# Patient Record
Sex: FEMALE | Race: WHITE | NOT HISPANIC OR LATINO | Employment: FULL TIME | URBAN - METROPOLITAN AREA
[De-identification: names, ages, dates, MRNs, and addresses within clinical notes are randomized per-mention and may not be internally consistent; named-entity substitution may affect disease eponyms.]

---

## 2017-12-05 ENCOUNTER — OFFICE VISIT (OUTPATIENT)
Dept: FAMILY MEDICINE | Facility: CLINIC | Age: 57
End: 2017-12-05
Payer: COMMERCIAL

## 2017-12-05 ENCOUNTER — RADIANT APPOINTMENT (OUTPATIENT)
Dept: GENERAL RADIOLOGY | Facility: CLINIC | Age: 57
End: 2017-12-05
Attending: NURSE PRACTITIONER
Payer: COMMERCIAL

## 2017-12-05 VITALS
SYSTOLIC BLOOD PRESSURE: 156 MMHG | RESPIRATION RATE: 16 BRPM | HEART RATE: 82 BPM | DIASTOLIC BLOOD PRESSURE: 90 MMHG | TEMPERATURE: 99.5 F | OXYGEN SATURATION: 95 %

## 2017-12-05 DIAGNOSIS — S92.302A CLOSED FRACTURE OF NECK OF METATARSAL BONE OF LEFT FOOT, INITIAL ENCOUNTER: Primary | ICD-10-CM

## 2017-12-05 DIAGNOSIS — S99.922A INJURY OF LEFT FOOT, INITIAL ENCOUNTER: ICD-10-CM

## 2017-12-05 PROCEDURE — 99214 OFFICE O/P EST MOD 30 MIN: CPT | Performed by: NURSE PRACTITIONER

## 2017-12-05 PROCEDURE — 73630 X-RAY EXAM OF FOOT: CPT | Mod: LT

## 2017-12-05 NOTE — NURSING NOTE
"Chief Complaint   Patient presents with     Musculoskeletal Problem     left foot        Initial /90  Pulse 82  Temp 99.5  F (37.5  C) (Tympanic)  Resp 16  SpO2 95% Estimated body mass index is 33.36 kg/(m^2) as calculated from the following:    Height as of 4/16/12: 5' 10\" (1.778 m).    Weight as of 4/16/12: 232 lb 7.6 oz (105.4 kg).  Medication Reconciliation: complete    Health Maintenance that is potentially due pending provider review:  NONE    n/a    Is there anyone who you would like to be able to receive your results? No  If yes have patient fill out HANNAH      "

## 2017-12-05 NOTE — PATIENT INSTRUCTIONS
Camwalker    Ortho referral    Non weight bearing until seen by ortho.    Follow-up with your primary care provider next week and as needed.    Indications for emergent return to emergency department discussed with patient, who verbalized good understanding and agreement.  Patient understands the limitations of today's evaluation.         Foot Fracture  You have a broken bone (fracture) in your foot. This will cause pain, swelling, and often bruising. It will usually take about 4 to 8 weeks to heal. A foot fracture may be treated with a special shoe, splint, cast, or boot.  Home care  Follow these guidelines when caring for yourself at home:    You may be given a splint, cast, shoe, or boot to keep the injured area from moving. Unless you were told otherwise, use crutches or a walker. Don t put weight on the injured foot until your health care provider says you can do so. (You can rent crutches and a walker at many pharmacies and surgical or orthopedic supply stores.) Don t put weight on a splint, or it will break.    Keep your leg elevated to reduce pain and swelling. When sleeping, put a pillow under the injured leg. When sitting, support the injured leg so it is above your waist. This is very important during the first 2 days (48 hours).    Put an ice pack on the injured area. Do this for 20 minutes every 1 to 2 hours the first day for pain relief. You can make an ice pack by wrapping a plastic bag of ice cubes in a thin towel. As the ice melts, be careful that the splint, cast, boot, or shoe doesn t get wet. You can place the ice pack directly over the splint or cast. Unless told otherwise, you can open the boot or shoe to apply the ice pack. Continue using the ice pack 3 to 4 times a day for the next 2 days. Then use the ice pack as needed to ease pain and swelling.    Keep the splint, cast, boot, or shoe dry. When bathing, protect it with a large plastic bag, rubber-banded at the top end. If a fiberglass  splint or cast or boot gets wet, you can dry it with a hair dryer. Unless told otherwise, you can take off the boot or shoe to bathe.    You may use acetaminophen or ibuprofen to control pain, unless another pain medicine was prescribed. If you have chronic liver or kidney disease, talk with your healthcare provider before using these medicines. Also talk with your provider if you ve had a stomach ulcer or gastrointestinal bleeding.    Don t put creams or objects under the cast if you have itching.  Follow-up care  Follow up with your healthcare provider, or as advised. This is to make sure the bone is healing the way it should. If you were given a splint, it may be changed to a cast or boot at your follow-up visit.  X-rays may be taken. You will be told of any new findings that may affect your care.  When to seek medical advice  Call your healthcare provider right away if any of these occur:    The cast or splint cracks    The plaster cast or splint becomes wet or soft    The fiberglass cast or splint stays wet for more than 24 hours    Bad odor from the cast or wound fluid stains the cast    Tightness or pain under the cast or splint gets worse    Toes become swollen, cold, blue, numb, or tingly    You can t move your toes    Skin around cast or splint becomes red    Fever of 100.4 F (38 C) or higher, or as directed by your healthcare provider  Date Last Reviewed: 2/1/2017 2000-2017 [employer]. [address] [place] [address], [place] [address]. All rights reserved. This information is not intended as a substitute for professional medical care. Always follow your healthcare professional's instructions.

## 2017-12-05 NOTE — PROGRESS NOTES
SUBJECTIVE:   Ai Boyer is a 57 year old female who presents to clinic today for the following health issues:      Musculoskeletal problem/pain      Duration: yesterday     Description  Location: left foot     Intensity:  moderate    Accompanying signs and symptoms: numbness, tingling, swelling and redness, bruising     History  Previous similar problem: YES- has had many foot injuries in the past. Wears a splint because of weakness.   Previous evaluation:  x-ray    Precipitating or alleviating factors:  Trauma or overuse: YES- rolled foot yesterday around 3pm.   Aggravating factors include: walking and overuse    Therapies tried and outcome: Aleve, aspirin 325mg this morning, iced foot every 20 minutes on and off             Problem list and histories reviewed & adjusted, as indicated.  Additional history: as documented    Patient Active Problem List   Diagnosis     Localized osteoarthrosis, ankle and foot     Past Surgical History:   Procedure Laterality Date     ARTHRODESIS ANKLE  4/16/2012    Procedure:ARTHRODESIS ANKLE; right ankle medial closing wedge ostetomy intermedually nailing removal of hardware; Surgeon:HUMA GARRETT; Location:[facility]     GENITOURINARY SURGERY      oophorectomy     ORTHOPEDIC SURGERY      multiple orthopedic       Social History   Substance Use Topics     Smoking status: Current Every Day Smoker     Packs/day: 1.00     Types: Cigarettes     Smokeless tobacco: Never Used     Alcohol use Yes     History reviewed. No pertinent family history.      Current Outpatient Prescriptions   Medication Sig Dispense Refill     Sertraline HCl (ZOLOFT PO) Take 75 mg by mouth daily       order for DME Equipment being ordered: Expandlyker 1 Device 0     Cholecalciferol (VITAMIN D3 PO) Take 2,000 Units by mouth daily.       HYDROmorphone (DILAUDID) 4 MG tablet Take 1-2 tablets by mouth every 4 hours as needed. (Patient not taking: Reported on 12/5/2017) 80 tablet 0     oxyCODONE (OXYCONTIN) 20  MG 12 hr tablet Take 1 tablet by mouth every 12 hours. (Patient not taking: Reported on 12/5/2017) 30 tablet 0     hydrOXYzine (ATARAX) 25 MG tablet Take 1-2 tablets by mouth every 4 hours as needed (pain). (Patient not taking: Reported on 12/5/2017) 60 tablet 0     enoxaparin (LOVENOX) 40 MG/0.4ML SOLN Inject 0.4 mLs Subcutaneous every 24 hours. (Patient not taking: Reported on 12/5/2017) 5 Syringe 0     HYDROCHLOROTHIAZIDE PO Take 25 mg by mouth daily.       ALPRAZOLAM PO Take 0.5 mg by mouth 2 times daily as needed.       Allergies   Allergen Reactions     Sporanox [Itraconazole] Anaphylaxis     Amoxicillin Itching     Ceftin Hives     Clindamycin Hcl Itching     Codeine Sulfate Nausea     Morphine Hcl Hives     Keflex [Cephalexin Hcl] Rash     Labs reviewed in EPIC      Reviewed and updated as needed this visit by clinical staffTobacco  Allergies  Meds  Problems  Med Hx  Surg Hx  Fam Hx  Soc Hx        Reviewed and updated as needed this visit by Provider  Allergies  Meds  Problems         ROS:  Constitutional, HEENT, cardiovascular, pulmonary, GI, , musculoskeletal, neuro, skin, endocrine and psych systems are negative, except as otherwise noted.      OBJECTIVE:   /90  Pulse 82  Temp 99.5  F (37.5  C) (Tympanic)  Resp 16  SpO2 95%  There is no height or weight on file to calculate BMI.   GENERAL: healthy, alert and no distress, nontoxic in appearance  EYES: Eyes grossly normal to inspection, PERRL and conjunctivae and sclerae normal  HENT: normocephalic  NECK: supple with full ROM  ABDOMEN: soft, nontender, no hepatosplenomegaly, no masses   MS: left foot is purplish pink which is normal for her. Has some pain and bruising on top of foot at base of toes. Tenderness around lateral malleolus and on lateral side of 5th metatarsal.    Diagnostic Test Results:XR FOOT LT G/E 3 VW 12/5/2017 3:09 PM     HISTORY: Foot injury.     COMPARISON: None.     FINDINGS: Nondisplaced fracture through the  distal third metatarsal.  Postoperative change in the great toe. Old healed fracture through the  fifth metatarsal. Other visualized osseous structures are within  normal limits.         IMPRESSION: Nondisplaced third metatarsal neck fracture.     YISEL BATEMAN MD  Results for orders placed or performed in visit on 12/05/17 (from the past 24 hour(s))   XR Foot Left G/E 3 Views    Narrative    XR FOOT LT G/E 3 VW 12/5/2017 3:09 PM    HISTORY: Foot injury.    COMPARISON: None.    FINDINGS: Nondisplaced fracture through the distal third metatarsal.  Postoperative change in the great toe. Old healed fracture through the  fifth metatarsal. Other visualized osseous structures are within  normal limits.      Impression    IMPRESSION: Nondisplaced third metatarsal neck fracture.    YISEL BATEMAN MD       ASSESSMENT/PLAN:   Horacio peña is giving her comfort. She is to be non weightbearing until seen by ortho. Follow up with PCP as needed.  Problem List Items Addressed This Visit     None      Visit Diagnoses     Closed fracture of neck of metatarsal bone of left foot, initial encounter    -  Primary    Relevant Medications    order for DME    Other Relevant Orders    ORTHO  REFERRAL    Foot injury        Relevant Orders    XR Foot Left G/E 3 Views (Completed)               Patient Instructions   Hakeemwalneda    Ortho referral    Non weight bearing until seen by ortho.    Follow-up with your primary care provider next week and as needed.    Indications for emergent return to emergency department discussed with patient, who verbalized good understanding and agreement.  Patient understands the limitations of today's evaluation.         Foot Fracture  You have a broken bone (fracture) in your foot. This will cause pain, swelling, and often bruising. It will usually take about 4 to 8 weeks to heal. A foot fracture may be treated with a special shoe, splint, cast, or boot.  Home care  Follow these guidelines when caring  for yourself at home:    You may be given a splint, cast, shoe, or boot to keep the injured area from moving. Unless you were told otherwise, use crutches or a walker. Don t put weight on the injured foot until your health care provider says you can do so. (You can rent crutches and a walker at many pharmacies and surgical or orthopedic supply stores.) Don t put weight on a splint, or it will break.    Keep your leg elevated to reduce pain and swelling. When sleeping, put a pillow under the injured leg. When sitting, support the injured leg so it is above your waist. This is very important during the first 2 days (48 hours).    Put an ice pack on the injured area. Do this for 20 minutes every 1 to 2 hours the first day for pain relief. You can make an ice pack by wrapping a plastic bag of ice cubes in a thin towel. As the ice melts, be careful that the splint, cast, boot, or shoe doesn t get wet. You can place the ice pack directly over the splint or cast. Unless told otherwise, you can open the boot or shoe to apply the ice pack. Continue using the ice pack 3 to 4 times a day for the next 2 days. Then use the ice pack as needed to ease pain and swelling.    Keep the splint, cast, boot, or shoe dry. When bathing, protect it with a large plastic bag, rubber-banded at the top end. If a fiberglass splint or cast or boot gets wet, you can dry it with a hair dryer. Unless told otherwise, you can take off the boot or shoe to bathe.    You may use acetaminophen or ibuprofen to control pain, unless another pain medicine was prescribed. If you have chronic liver or kidney disease, talk with your healthcare provider before using these medicines. Also talk with your provider if you ve had a stomach ulcer or gastrointestinal bleeding.    Don t put creams or objects under the cast if you have itching.  Follow-up care  Follow up with your healthcare provider, or as advised. This is to make sure the bone is healing the way it  should. If you were given a splint, it may be changed to a cast or boot at your follow-up visit.  X-rays may be taken. You will be told of any new findings that may affect your care.  When to seek medical advice  Call your healthcare provider right away if any of these occur:    The cast or splint cracks    The plaster cast or splint becomes wet or soft    The fiberglass cast or splint stays wet for more than 24 hours    Bad odor from the cast or wound fluid stains the cast    Tightness or pain under the cast or splint gets worse    Toes become swollen, cold, blue, numb, or tingly    You can t move your toes    Skin around cast or splint becomes red    Fever of 100.4 F (38 C) or higher, or as directed by your healthcare provider  Date Last Reviewed: 2/1/2017 2000-2017 [employer]. [address] [place] [address]. All rights reserved. This information is not intended as a substitute for professional medical care. Always follow your healthcare professional's instructions.            ROXANNA Rankin [facility]

## 2017-12-05 NOTE — MR AVS SNAPSHOT
After Visit Summary   12/5/2017    Ai Boyer    MRN: [id number]           Patient Information     Date Of Birth          1960        Visit Information        Provider Department      12/5/2017 2:00 PM Lita Diaz APRN [facility]        Today's Diagnoses     Closed fracture of neck of metatarsal bone of left foot, initial encounter    -  1    Foot injury          Care Instructions    Camwalker    Ortho referral    Non weight bearing until seen by ortho.    Follow-up with your primary care provider next week and as needed.    Indications for emergent return to emergency department discussed with patient, who verbalized good understanding and agreement.  Patient understands the limitations of today's evaluation.         Foot Fracture  You have a broken bone (fracture) in your foot. This will cause pain, swelling, and often bruising. It will usually take about 4 to 8 weeks to heal. A foot fracture may be treated with a special shoe, splint, cast, or boot.  Home care  Follow these guidelines when caring for yourself at home:    You may be given a splint, cast, shoe, or boot to keep the injured area from moving. Unless you were told otherwise, use crutches or a walker. Don t put weight on the injured foot until your health care provider says you can do so. (You can rent crutches and a walker at many pharmacies and surgical or orthopedic supply stores.) Don t put weight on a splint, or it will break.    Keep your leg elevated to reduce pain and swelling. When sleeping, put a pillow under the injured leg. When sitting, support the injured leg so it is above your waist. This is very important during the first 2 days (48 hours).    Put an ice pack on the injured area. Do this for 20 minutes every 1 to 2 hours the first day for pain relief. You can make an ice pack by wrapping a plastic bag of ice cubes in a thin towel. As the ice melts, be careful that the splint,  cast, boot, or shoe doesn t get wet. You can place the ice pack directly over the splint or cast. Unless told otherwise, you can open the boot or shoe to apply the ice pack. Continue using the ice pack 3 to 4 times a day for the next 2 days. Then use the ice pack as needed to ease pain and swelling.    Keep the splint, cast, boot, or shoe dry. When bathing, protect it with a large plastic bag, rubber-banded at the top end. If a fiberglass splint or cast or boot gets wet, you can dry it with a hair dryer. Unless told otherwise, you can take off the boot or shoe to bathe.    You may use acetaminophen or ibuprofen to control pain, unless another pain medicine was prescribed. If you have chronic liver or kidney disease, talk with your healthcare provider before using these medicines. Also talk with your provider if you ve had a stomach ulcer or gastrointestinal bleeding.    Don t put creams or objects under the cast if you have itching.  Follow-up care  Follow up with your healthcare provider, or as advised. This is to make sure the bone is healing the way it should. If you were given a splint, it may be changed to a cast or boot at your follow-up visit.  X-rays may be taken. You will be told of any new findings that may affect your care.  When to seek medical advice  Call your healthcare provider right away if any of these occur:    The cast or splint cracks    The plaster cast or splint becomes wet or soft    The fiberglass cast or splint stays wet for more than 24 hours    Bad odor from the cast or wound fluid stains the cast    Tightness or pain under the cast or splint gets worse    Toes become swollen, cold, blue, numb, or tingly    You can t move your toes    Skin around cast or splint becomes red    Fever of 100.4 F (38 C) or higher, or as directed by your healthcare provider  Date Last Reviewed: 2/1/2017 2000-2017 [employer]. [address] [place] [address]. All rights reserved. This  information is not intended as a substitute for professional medical care. Always follow your healthcare professional's instructions.                Follow-ups after your visit        Additional Services     ORTHO  REFERRAL       [facility] is referring you to the Orthopedic  Services at [facility].       The  Representative will assist you in the coordination of your Orthopedic and Musculoskeletal Care as prescribed by your physician.    The  Representative will call you within 1 business day to help schedule your appointment, or you may contact the  Representative at:    All areas ~ [phone]     Type of Referral : Non Surgical       Timeframe requested: 1 - 2 days    Coverage of these services is subject to the terms and limitations of your health insurance plan.  Please call member services at your health plan with any benefit or coverage questions.      If X-rays, CT or MRI's have been performed, please contact the facility where they were done to arrange for , prior to your scheduled appointment.  Please bring this referral request to your appointment and present it to your specialist.                  Follow-up notes from your care team     See patient instructions section of the AVS Return in about 2 days (around 12/7/2017) for Follow up with your specialist.      Who to contact     If you have questions or need follow up information about today's clinic visit or your schedule please contact [facility] directly at [phone].  Normal or non-critical lab and imaging results will be communicated to you by MyChart, letter or phone within 4 business days after the clinic has received the results. If you do not hear from us within 7 days, please contact the clinic through MyChart or phone. If you have a critical or abnormal lab result, we will notify you by phone as soon as possible.  Submit refill  "requests through [employer] or call your pharmacy and they will forward the refill request to us. Please allow 3 business days for your refill to be completed.          Additional Information About Your Visit        [email or web address] Information     [employer] lets you send messages to your doctor, view your test results, renew your prescriptions, schedule appointments and more. To sign up, go to [email or web address] . Click on \"Log in\" on the left side of the screen, which will take you to the Welcome page. Then click on \"Sign up Now\" on the right side of the page.     You will be asked to enter the access code listed below, as well as some personal information. Please follow the directions to create your username and password.     Your access code is: [email or web address]  Expires: 3/5/2018  3:29 PM     Your access code will  in 90 days. If you need help or a new code, please call your [facility] clinic or [phone].        Care EveryWhere ID     This is your Care EveryWhere ID. This could be used by other organizations to access your [facility] medical records  [id number]        Your Vitals Were     Pulse Temperature Respirations Pulse Oximetry          82 99.5  F (37.5  C) (Tympanic) 16 95%         Blood Pressure from Last 3 Encounters:   17 156/90   12 93/54    Weight from Last 3 Encounters:   12 232 lb 7.6 oz (105.4 kg)              We Performed the Following     ORTHO  REFERRAL     XR Foot Left G/E 3 Views          Today's Medication Changes          These changes are accurate as of: 17  3:29 PM.  If you have any questions, ask your nurse or doctor.               Start taking these medicines.        Dose/Directions    order for DME   Used for:  Closed fracture of neck of metatarsal bone of left foot, initial encounter   Started by:  Lita Diaz APRN CNP        Equipment being ordered: casey   Quantity:  1 Device   Refills:  0            Where to get your medicines      Some of " these will need a paper prescription and others can be bought over the counter.  Ask your nurse if you have questions.     Bring a paper prescription for each of these medications     order for DME                Primary Care Provider Fax #    [facility] [phone]       [address] [address]        Equal Access to Services     VAL FERNANDEZ : Juan Manuel dobbs hadasho Soomaali, waaxda luqadaha, qaybta kaalmada adeegyada, tova valdezzulmaarvind rees. So [facility] [phone].    ATENCIÓN: Si habla español, tiene a beltran disposición servicios gratuitos de asistencia lingüística. Sheila al [phone].    We comply with applicable federal civil rights laws and [place] laws. We do not discriminate on the basis of race, color, national origin, age, disability, sex, sexual orientation, or gender identity.            Thank you!     Thank you for choosing [facility]  for your care. Our goal is always to provide you with excellent care. Hearing back from our patients is one way we can continue to improve our services. Please take a few minutes to complete the written survey that you may receive in the mail after your visit with us. Thank you!             Your Updated Medication List - Protect others around you: Learn how to safely use, store and throw away your medicines at www.disposemymeds.org.          This list is accurate as of: 12/5/17  3:29 PM.  Always use your most recent med list.                   Brand Name Dispense Instructions for use Diagnosis    ALPRAZOLAM PO      Take 0.5 mg by mouth 2 times daily as needed.        enoxaparin 40 MG/0.4ML injection    LOVENOX    5 Syringe    Inject 0.4 mLs Subcutaneous every 24 hours.    Localized osteoarthrosis not specified whether primary or secondary, ankle and foot       HYDROCHLOROTHIAZIDE PO      Take 25 mg by mouth daily.        HYDROmorphone 4 MG tablet    DILAUDID    80 tablet    Take 1-2 tablets by mouth  every 4 hours as needed.    Localized osteoarthrosis not specified whether primary or secondary, ankle and foot       hydrOXYzine 25 MG tablet    ATARAX    60 tablet    Take 1-2 tablets by mouth every 4 hours as needed (pain).    Localized osteoarthrosis not specified whether primary or secondary, ankle and foot       order for DME     1 Device    Equipment being ordered: camwalker    Closed fracture of neck of metatarsal bone of left foot, initial encounter       oxyCODONE 20 MG 12 hr tablet    OxyCONTIN    30 tablet    Take 1 tablet by mouth every 12 hours.    Localized osteoarthrosis not specified whether primary or secondary, ankle and foot       VITAMIN D3 PO      Take 2,000 Units by mouth daily.        ZOLOFT PO      Take 75 mg by mouth daily

## 2018-11-13 DIAGNOSIS — Z96.642 HISTORY OF TOTAL HIP ARTHROPLASTY, LEFT: Primary | ICD-10-CM

## 2018-11-13 LAB
CRP SERPL-MCNC: <2.9 MG/L (ref 0–8)
ERYTHROCYTE [SEDIMENTATION RATE] IN BLOOD BY WESTERGREN METHOD: 3 MM/H (ref 0–30)

## 2018-11-13 PROCEDURE — 85652 RBC SED RATE AUTOMATED: CPT | Performed by: ORTHOPAEDIC SURGERY

## 2018-11-13 PROCEDURE — 36415 COLL VENOUS BLD VENIPUNCTURE: CPT | Performed by: ORTHOPAEDIC SURGERY

## 2018-11-13 PROCEDURE — 82495 ASSAY OF CHROMIUM: CPT | Mod: 90 | Performed by: ORTHOPAEDIC SURGERY

## 2018-11-13 PROCEDURE — 83018 HEAVY METAL QUAN EACH NES: CPT | Mod: 90 | Performed by: ORTHOPAEDIC SURGERY

## 2018-11-13 PROCEDURE — 86140 C-REACTIVE PROTEIN: CPT | Performed by: ORTHOPAEDIC SURGERY

## 2018-11-13 PROCEDURE — 99000 SPECIMEN HANDLING OFFICE-LAB: CPT | Performed by: ORTHOPAEDIC SURGERY

## 2018-11-14 ENCOUNTER — TELEPHONE (OUTPATIENT)
Dept: FAMILY MEDICINE | Facility: CLINIC | Age: 58
End: 2018-11-14

## 2018-11-15 NOTE — TELEPHONE ENCOUNTER
Pt called back and read back the message re: Colon Cancer Screening.    Pt had it done with Leonarda Aguilar 2015 and is not due until 2020.    Marita More  [facility]

## 2018-11-22 LAB
COBALT SERPL-MCNC: 100.5 UG/L
CR SERPL-MCNC: 51 UG/L

## 2018-12-04 ENCOUNTER — HOSPITAL ENCOUNTER (OUTPATIENT)
Dept: LAB | Facility: CLINIC | Age: 58
Discharge: HOME | End: 2018-12-04
Attending: INTERNAL MEDICINE | Admitting: INTERNAL MEDICINE
Payer: COMMERCIAL

## 2018-12-04 ENCOUNTER — HOSPITAL ENCOUNTER (OUTPATIENT)
Facility: CLINIC | Age: 58
Discharge: HOME | End: 2018-12-04
Attending: INTERNAL MEDICINE | Admitting: INTERNAL MEDICINE
Payer: COMMERCIAL

## 2018-12-04 ENCOUNTER — ONCOLOGY VISIT (OUTPATIENT)
Dept: ONCOLOGY | Facility: CLINIC | Age: 58
End: 2018-12-04
Attending: INTERNAL MEDICINE
Payer: COMMERCIAL

## 2018-12-04 VITALS
RESPIRATION RATE: 18 BRPM | HEART RATE: 86 BPM | HEIGHT: 69 IN | DIASTOLIC BLOOD PRESSURE: 80 MMHG | OXYGEN SATURATION: 98 % | SYSTOLIC BLOOD PRESSURE: 155 MMHG | WEIGHT: 208.9 LBS | TEMPERATURE: 99 F | BODY MASS INDEX: 30.94 KG/M2

## 2018-12-04 DIAGNOSIS — D75.1 ERYTHROCYTOSIS: Primary | ICD-10-CM

## 2018-12-04 LAB
BASOPHILS # BLD AUTO: 0.1 10E9/L (ref 0–0.2)
BASOPHILS NFR BLD AUTO: 0.9 %
DIFFERENTIAL METHOD BLD: NORMAL
EOSINOPHIL # BLD AUTO: 0.1 10E9/L (ref 0–0.7)
EOSINOPHIL NFR BLD AUTO: 1.7 %
ERYTHROCYTE [DISTWIDTH] IN BLOOD BY AUTOMATED COUNT: 12.5 % (ref 10–15)
HCT VFR BLD AUTO: 47 % (ref 35–47)
HGB BLD-MCNC: 15.6 G/DL (ref 11.7–15.7)
IMM GRANULOCYTES # BLD: 0 10E9/L (ref 0–0.4)
IMM GRANULOCYTES NFR BLD: 0.3 %
LYMPHOCYTES # BLD AUTO: 1.9 10E9/L (ref 0.8–5.3)
LYMPHOCYTES NFR BLD AUTO: 30.1 %
MCH RBC QN AUTO: 30.6 PG (ref 26.5–33)
MCHC RBC AUTO-ENTMCNC: 33.2 G/DL (ref 31.5–36.5)
MCV RBC AUTO: 92 FL (ref 78–100)
MONOCYTES # BLD AUTO: 0.5 10E9/L (ref 0–1.3)
MONOCYTES NFR BLD AUTO: 7.2 %
NEUTROPHILS # BLD AUTO: 3.8 10E9/L (ref 1.6–8.3)
NEUTROPHILS NFR BLD AUTO: 59.8 %
NRBC # BLD AUTO: 0 10*3/UL
NRBC BLD AUTO-RTO: 0 /100
PLATELET # BLD AUTO: 224 10E9/L (ref 150–450)
RBC # BLD AUTO: 5.1 10E12/L (ref 3.8–5.2)
WBC # BLD AUTO: 6.4 10E9/L (ref 4–11)

## 2018-12-04 PROCEDURE — 81403 MOPATH PROCEDURE LEVEL 4: CPT | Performed by: INTERNAL MEDICINE

## 2018-12-04 PROCEDURE — 82668 ASSAY OF ERYTHROPOIETIN: CPT | Performed by: INTERNAL MEDICINE

## 2018-12-04 PROCEDURE — G0463 HOSPITAL OUTPT CLINIC VISIT: HCPCS

## 2018-12-04 PROCEDURE — 85025 COMPLETE CBC W/AUTO DIFF WBC: CPT | Performed by: INTERNAL MEDICINE

## 2018-12-04 PROCEDURE — 85060 BLOOD SMEAR INTERPRETATION: CPT | Performed by: INTERNAL MEDICINE

## 2018-12-04 PROCEDURE — 36415 COLL VENOUS BLD VENIPUNCTURE: CPT | Performed by: INTERNAL MEDICINE

## 2018-12-04 PROCEDURE — 99204 OFFICE O/P NEW MOD 45 MIN: CPT | Performed by: INTERNAL MEDICINE

## 2018-12-04 PROCEDURE — 81219 CALR GENE COM VARIANTS: CPT | Performed by: INTERNAL MEDICINE

## 2018-12-04 PROCEDURE — 40000847 ZZHCL STATISTIC MORPHOLOGY W/INTERP HISTOLOGY TC 85060: Performed by: INTERNAL MEDICINE

## 2018-12-04 RX ORDER — BUPROPION HYDROCHLORIDE 150 MG/1
150 TABLET ORAL DAILY
COMMUNITY
Start: 2018-10-22 | End: 2021-12-02

## 2018-12-04 ASSESSMENT — PAIN SCALES - GENERAL: PAINLEVEL: NO PAIN (0)

## 2018-12-04 NOTE — LETTER
"    12/4/2018         RE: Ai Boyer  [address]  [facility] [address]        Dear Colleague,    Thank you for referring your patient, Ai Boyer, to the [facility]. Please see a copy of my visit note below.    Visit Date:   12/04/2018      NEW PATIENT HEMATOLOGY CONSULTATION      REQUESTING PHYSICIAN:  Dr. More Brooks      CHIEF COMPLAINT/REASON FOR VISIT:  Erythrocytosis.      HISTORY OF PRESENT ILLNESS:  [age] years old.  She presented to us December 2018 at age 58 with the blood count from her OB/GYN's office back in October 2018 identified hemoglobin of 16.2, hematocrit of 48.9 with normal white count, platelet and differential.  Sed rate and C-reactive protein were also normal at that time.      According to the patient, this is the first time she has high hemoglobin through her GYN annual visit lab work.  She is a lifetime smoker, 1-1.5 pack per day since high school.  She also drinks alcohol.    She has been undergoing a lot of stress since fall 2018 with first rash treated with antiviral and then \"clicking sound of her left hip.\"  She had a hip replacement before and found to have high metal  content in the blood through her primary care doctor, likely released from her replaced hip.  She is going to have left hip revision.      She denied any night sweat, weight loss, pruritus changes on the skin.        She had baseline hemoglobin of 9 in  2009 in our system, so this is new mild erythrocytosis presented for this patient at age 58 in October 2018.      MEDICATIONS:  Reviewed in Epic system.        OTHER MEDICAL PROBLEMS:  Otherwise not significant.  She had a left hip replacement, and she has depression on Zoloft and anxiety.      SOCIAL HISTORY:  She is a lifetime smoker as above, 1-1.5 pack per day.  Chronic alcohol use, drinks beer every day.      FAMILY HISTORY:  Positive for grandmother had ovarian cancer.  No family history cytosis on the blood.      REVIEW OF " "SYSTEMS:     GENERAL: She is anxious.  No weight loss.  No B symptoms.     NEURO:   No headache, double vision, or focal weakness.  No neuropathy.   SKIN:  No chronic skin rash or skin infection.   CARDIOVASCULAR:  No palpitations, no chest pressure, no dyspnea on exertion.   PULMONARY:  No shortness of breath, no pleurisy, no cough, no hemoptysis.   GI:  No abdominal pain, nausea, vomiting, constipation.  No diarrhea.  No bright red blood per rectum.   :  No urgency, frequency.  No recurrent urinary tract infection.  No kidney problems.   RHEUMATOLOGY/MUSCULOSKELETAL SYSTEM:  No arthritis.  No joint pain.  No muscle ache.   ENDOCRINE:  No history of diabetes or thyroid problem.  No complaints of hot flashes.   HEMATOLOGY:  She had mild new erythrocytosis found in October 2018.     IMMUNOLOGY:  No recurrent fever or chills episode.  No recurrent infectious episode.   BREASTS/GYN:  No history of vaginal bleeding/discharge/dryness.  No breast pain or nipple discharge.       PHYSICAL EXAMINATION:   VITAL SIGNS:  Blood pressure 155/80, pulse 86, temperature 99  F (37.2  C), temperature source Tympanic, resp. rate 18, height 1.753 m (5' 9\"), weight 94.8 kg (208 lb 14.4 oz), SpO2 98 %, not currently breastfeeding.    ECOG status: 0.   GENERAL APPEARANCE:  Middle-aged woman looks like her stated age.  She has pleothorac face.  She is anxious.  She talks fast.     HEENT: The patient is normocephalic, atraumatic. Pupils are equally reactive to light.  Sclerae are anicteric.  Moist oral mucosa.  Negative pharynx.  No oral thrush.   NECK:  Supple.  No jugular venous distention.  Thyroid is not palpable.   LYMPH NODES:  Superficial lymphadenopathy is not appreciable in the bilateral cervical, supraclavicular, axillary or inguinal areas.   CARDIOVASCULAR:  S1, S2 regular with no murmurs or gallops.  No carotid or abdominal bruits.   PULMONARY:  Lungs are clear to auscultation and percussion bilaterally.  There is no wheezing " or rhonchi.   GASTROINTESTINAL:  Abdomen is soft, nontender.  No hepatosplenomegaly.  No signs of ascites.  No mass appreciable.   MUSCULOSKELETAL/EXTREMITIES:  No edema.  No cyanotic changes.  No signs of joint deformity.  No lymphedema.  No spinal or paraspinal tenderness.  No CVA tenderness.   NEUROLOGIC:  Cranial nerves II-XII are grossly intact.  Sensation intact.  Muscle strength and muscle tone symmetrical, 5/5 throughout.   SKIN:  No petechiae.  No rash.  No signs of cellulitis.       CURRENT LABORATORY DATA   outside from OB/GYN's office 10/2018 -  hemoglobin of 16.2, hematocrit of 48.9 with normal white count, platelet and differential.  Sed rate and C-reactive protein were also normal at that time.      CURRENT IMAGE DATA:  No current image data.        OLD DATA REVIEWED WITH SUMMARY:    Baseline hemoglobin in our system was 9.0.    MRI of lumbar spine 2009, DJD.      ASSESSMENT AND PLAN:   At age 58, she was found to have mild new erythrocytosis with hematocrit of 48.9 with normal white count, differential, and platelet count.  She is a lifetime smoker.  She also drank alcohol every day.  She has been going through a lot of stress.      1.  Top differential is smoking and stress-related erythrocytosis reactive, yet we will proceed with the hematologic workup with blood work, imaging study.  We will call her with result.   2.  Lifetime smoker.  Smoking cessation is advised.   3.  Alcohol overuse.  Alcohol cessation is advised.         JOSE M JUÁREZ MD             D: 2018   T: 2018   MT: FRANKI      Name:     LISBET KRISHNAMURTHY   MRN:      [id number]        Account:      [id number]   :      1960           Visit Date:   2018      Document: [id number]        Again, thank you for allowing me to participate in the care of your patient.        Sincerely,        Marycarmen Juárez MD, MD

## 2018-12-04 NOTE — MR AVS SNAPSHOT
After Visit Summary   12/4/2018    Ai Boyer    MRN: [id number]           Patient Information     Date Of Birth          1960        Visit Information        Provider Department      12/4/2018 10:00 AM Marycarmen Palmer MD [facility]        Today's Diagnoses     Erythrocytosis    -  1      Care Instructions    Labs and US as work up, will call pt on results.           Follow-ups after your visit        Your next 10 appointments already scheduled     Dec 12, 2018  8:00 AM CST   US ABDOMEN COMPLETE with WYUS1   [facility] ([facility])    [address]  [facility] [address]   [phone]           How do I prepare for my exam? (Food and drink instructions) Adults: No eating, smoking, gum chewing or drinking for 8 hours before the exam. You may take medicine with a small sip of water.  Children: * Infants, breast-fed: may have breast milk up to 2 hours before exam. * Infants, formula: may have bottle until 4 hours before exam. * Children 1-5 years: No food or drink for 4 hours before exam. * Children 6 -12 years: No food or drink for 6 hours before exam. * Children over 12 years: No food or drink for 8 hours before exam.  * J Tube Fed: No need to stop feedings.  What should I wear: Wear comfortable clothes.  How long does the exam take: Most ultrasounds take 30 to 60 minutes.  What should I bring: Bring a list of your medicines, including vitamins, minerals and over-the-counter drugs. It is safest to leave personal items at home.  Do I need a :  No  is needed.  What do I need to tell my doctor: Tell your doctor about any allergies you may have.  What should I do after the exam: No restrictions, You may resume normal activities.  What is this test: An ultrasound uses sound waves to make pictures of the body. Sound waves do not cause pain. The only discomfort may be the pressure of the wand against your skin or full bladder.  Who should I  "call with questions: If you have any questions, please call the Imaging Department where you will have your exam. Directions, parking instructions, and other information is available on our website, [email or web address].            2019   Procedure with More Stephens MD   [facility] (--)    [address]  [facility] [address]   [phone]              Future tests that were ordered for you today     Open Future Orders        Priority Expected Expires Ordered    US Abdomen Complete Routine  2019            Who to contact     If you have questions or need follow up information about today's clinic visit or your schedule please contact [facility] directly at [phone].  Normal or non-critical lab and imaging results will be communicated to you by MyChart, letter or phone within 4 business days after the clinic has received the results. If you do not hear from us within 7 days, please contact the clinic through MyChart or phone. If you have a critical or abnormal lab result, we will notify you by phone as soon as possible.  Submit refill requests through Fancy or call your pharmacy and they will forward the refill request to us. Please allow 3 business days for your refill to be completed.          Additional Information About Your Visit        Fancy Information     Fancy lets you send messages to your doctor, view your test results, renew your prescriptions, schedule appointments and more. To sign up, go to [email or web address] . Click on \"Log in\" on the left side of the screen, which will take you to the Welcome page. Then click on \"Sign up Now\" on the right side of the page.     You will be asked to enter the access code listed below, as well as some personal information. Please follow the directions to create your username and password.     Your access code is: [email or web address]  Expires: 3/4/2019 10:46 AM     Your access code will  in " "90 days. If you need help or a new code, please call your [place] clinic or [phone].        Care EveryWhere ID     This is your Care EveryWhere ID. This could be used by other organizations to access your [place] medical records  [id number]        Your Vitals Were     Pulse Temperature Respirations Height Pulse Oximetry Breastfeeding?    86 99  F (37.2  C) (Tympanic) 18 1.753 m (5' 9\") 98% No    BMI (Body Mass Index)                   30.85 kg/m2            Blood Pressure from Last 3 Encounters:   12/04/18 155/80   12/05/17 156/90   04/18/12 93/54    Weight from Last 3 Encounters:   12/04/18 94.8 kg (208 lb 14.4 oz)   04/16/12 105.4 kg (232 lb 7.6 oz)              We Performed the Following     Bld morphology pathology review     CBC with platelets differential     Erythropoietin     Next Generation Sequencing Oncology: Myeloproliferative Neoplasm Panel        Primary Care Provider Fax #    [facility] [phone]       [address] [address]        Equal Access to Services     SUSY FERNANDEZ : Hadii felicia dobbs hadasho Soomaali, waaxda luqadaha, qaybta kaalmada adeegbharat, tova martinez . So [facility] [phone].    ATENCIÓN: Si habla español, tiene a beltran disposición servicios gratuitos de asistencia lingüística. Llame al [phone].    We comply with applicable federal civil rights laws and [place] laws. We do not discriminate on the basis of race, color, national origin, age, disability, sex, sexual orientation, or gender identity.            Thank you!     Thank you for choosing [facility] [place] by [facility]  for your care. Our goal is always to provide you with excellent care. Hearing back from our patients is one way we can continue to improve our services. Please take a few minutes to complete the written survey that you may receive in the mail after your visit with us. Thank you!             Your Updated Medication List - Protect others around you: Learn " how to safely use, store and throw away your medicines at www.disposemymeds.org.          This list is accurate as of 12/4/18 10:46 AM.  Always use your most recent med list.                   Brand Name Dispense Instructions for use Diagnosis    buPROPion 150 MG 24 hr tablet    WELLBUTRIN XL      Erythrocytosis       order for DME     1 Device    Equipment being ordered: casey    Closed fracture of neck of metatarsal bone of left foot, initial encounter       VITAMIN D3 PO      Take 2,000 Units by mouth daily.        ZOLOFT PO      Take 75 mg by mouth daily

## 2018-12-04 NOTE — PATIENT INSTRUCTIONS
Dr. Palmer would like you to have an ultrasound and labs. We will call you with these results.     When you are in need of a refill, please call your pharmacy and they will send us a request.      Copy of appointments, and after visit summary (AVS) given to patient.      If you have any questions please call Kandice Chau RN, BSN Oncology Hematology  [facility] [phone]. For questions after business hours, or on holidays/weekends, please call our after hours Nurse Triage line [phone]. Thank you.               Labs and US as work up, will call pt on results.

## 2018-12-04 NOTE — NURSING NOTE
"Oncology Rooming Note    December 4, 2018 10:21 AM   Ai Boyer is a 58 year old female who presents for:    Chief Complaint   Patient presents with     Hematology     NEW, elevated red blood cells, hgb and hematocrit.      Initial Vitals: Pulse 86  Temp 99  F (37.2  C) (Tympanic)  Resp 18  Ht 1.753 m (5' 9\")  Wt 94.8 kg (208 lb 14.4 oz)  SpO2 98%  Breastfeeding? No  BMI 30.85 kg/m2 Estimated body mass index is 30.85 kg/(m^2) as calculated from the following:    Height as of this encounter: 1.753 m (5' 9\").    Weight as of this encounter: 94.8 kg (208 lb 14.4 oz). Body surface area is 2.15 meters squared.  No Pain (0) Comment: Data Unavailable   No LMP recorded. Patient is postmenopausal.  Allergies reviewed: Yes  Medications reviewed: Yes    Medications: Medication refills not needed today.  Pharmacy name entered into EPIC: Data Unavailable    Clinical concerns: NEW, elevated red blood cells, hgb, and hematocrit. C/o headaches, hungry but feels full quickly, skin redness. Diagnosed with shingle in September of 2018.    8 minutes for nursing intake (face to face time)     Angy Burch CMA            "

## 2018-12-04 NOTE — PROGRESS NOTES
"Visit Date:   12/04/2018      NEW PATIENT HEMATOLOGY CONSULTATION      REQUESTING PHYSICIAN:  Dr. More Brooks      CHIEF COMPLAINT/REASON FOR VISIT:  Erythrocytosis.      HISTORY OF PRESENT ILLNESS:  [age] years old.  She presented to us December 2018 at age 58 with the blood count from her OB/GYN's office back in October 2018 identified hemoglobin of 16.2, hematocrit of 48.9 with normal white count, platelet and differential.  Sed rate and C-reactive protein were also normal at that time.      According to the patient, this is the first time she has high hemoglobin through her GYN annual visit lab work.  She is a lifetime smoker, 1-1.5 pack per day since high school.  She also drinks alcohol.    She has been undergoing a lot of stress since fall 2018 with first rash treated with antiviral and then \"clicking sound of her left hip.\"  She had a hip replacement before and found to have high metal  content in the blood through her primary care doctor, likely released from her replaced hip.  She is going to have left hip revision.      She denied any night sweat, weight loss, pruritus changes on the skin.        She had baseline hemoglobin of 9 in  2009 in our system, so this is new mild erythrocytosis presented for this patient at age 58 in October 2018.      MEDICATIONS:  Reviewed in Epic system.        OTHER MEDICAL PROBLEMS:  Otherwise not significant.  She had a left hip replacement, and she has depression on Zoloft and anxiety.      SOCIAL HISTORY:  She is a lifetime smoker as above, 1-1.5 pack per day.  Chronic alcohol use, drinks beer every day.      FAMILY HISTORY:  Positive for grandmother had ovarian cancer.  No family history cytosis on the blood.      REVIEW OF SYSTEMS:     GENERAL: She is anxious.  No weight loss.  No B symptoms.     NEURO:   No headache, double vision, or focal weakness.  No neuropathy.   SKIN:  No chronic skin rash or skin infection.   CARDIOVASCULAR:  No palpitations, no chest " "pressure, no dyspnea on exertion.   PULMONARY:  No shortness of breath, no pleurisy, no cough, no hemoptysis.   GI:  No abdominal pain, nausea, vomiting, constipation.  No diarrhea.  No bright red blood per rectum.   :  No urgency, frequency.  No recurrent urinary tract infection.  No kidney problems.   RHEUMATOLOGY/MUSCULOSKELETAL SYSTEM:  No arthritis.  No joint pain.  No muscle ache.   ENDOCRINE:  No history of diabetes or thyroid problem.  No complaints of hot flashes.   HEMATOLOGY:  She had mild new erythrocytosis found in October 2018.     IMMUNOLOGY:  No recurrent fever or chills episode.  No recurrent infectious episode.   BREASTS/GYN:  No history of vaginal bleeding/discharge/dryness.  No breast pain or nipple discharge.       PHYSICAL EXAMINATION:   VITAL SIGNS:  Blood pressure 155/80, pulse 86, temperature 99  F (37.2  C), temperature source Tympanic, resp. rate 18, height 1.753 m (5' 9\"), weight 94.8 kg (208 lb 14.4 oz), SpO2 98 %, not currently breastfeeding.    ECOG status: 0.   GENERAL APPEARANCE:  Middle-aged woman looks like her stated age.  She has pleothorac face.  She is anxious.  She talks fast.     HEENT: The patient is normocephalic, atraumatic. Pupils are equally reactive to light.  Sclerae are anicteric.  Moist oral mucosa.  Negative pharynx.  No oral thrush.   NECK:  Supple.  No jugular venous distention.  Thyroid is not palpable.   LYMPH NODES:  Superficial lymphadenopathy is not appreciable in the bilateral cervical, supraclavicular, axillary or inguinal areas.   CARDIOVASCULAR:  S1, S2 regular with no murmurs or gallops.  No carotid or abdominal bruits.   PULMONARY:  Lungs are clear to auscultation and percussion bilaterally.  There is no wheezing or rhonchi.   GASTROINTESTINAL:  Abdomen is soft, nontender.  No hepatosplenomegaly.  No signs of ascites.  No mass appreciable.   MUSCULOSKELETAL/EXTREMITIES:  No edema.  No cyanotic changes.  No signs of joint deformity.  No lymphedema.  No " spinal or paraspinal tenderness.  No CVA tenderness.   NEUROLOGIC:  Cranial nerves II-XII are grossly intact.  Sensation intact.  Muscle strength and muscle tone symmetrical, 5/5 throughout.   SKIN:  No petechiae.  No rash.  No signs of cellulitis.       CURRENT LABORATORY DATA   outside from OB/GYN's office 10/2018 -  hemoglobin of 16.2, hematocrit of 48.9 with normal white count, platelet and differential.  Sed rate and C-reactive protein were also normal at that time.      CURRENT IMAGE DATA:  No current image data.        OLD DATA REVIEWED WITH SUMMARY:    Baseline hemoglobin in our system was 9.0.    MRI of lumbar spine 2009, DJD.      ASSESSMENT AND PLAN:   At age 58, she was found to have mild new erythrocytosis with hematocrit of 48.9 with normal white count, differential, and platelet count.  She is a lifetime smoker.  She also drank alcohol every day.  She has been going through a lot of stress.      1.  Top differential is smoking and stress-related erythrocytosis reactive, yet we will proceed with the hematologic workup with blood work, imaging study.  We will call her with result.   2.  Lifetime smoker.  Smoking cessation is advised.   3.  Alcohol overuse.  Alcohol cessation is advised.         JOSE M JUÁREZ MD             D: 2018   T: 2018   MT: FRANKI      Name:     LISBET KRISHNAMURTHY   MRN:      [id number]        Account:      [id number]   :      1960           Visit Date:   2018      Document: [id number]

## 2018-12-05 LAB
COPATH REPORT: NORMAL
EPO SERPL-ACNC: 5 MU/ML (ref 4–27)

## 2018-12-12 ENCOUNTER — HOSPITAL ENCOUNTER (OUTPATIENT)
Dept: ULTRASOUND IMAGING | Facility: CLINIC | Age: 58
Discharge: HOME | End: 2018-12-12
Attending: INTERNAL MEDICINE | Admitting: INTERNAL MEDICINE
Payer: COMMERCIAL

## 2018-12-12 DIAGNOSIS — D75.1 ERYTHROCYTOSIS: ICD-10-CM

## 2018-12-12 LAB — COPATH REPORT: NORMAL

## 2018-12-12 PROCEDURE — 76700 US EXAM ABDOM COMPLETE: CPT

## 2018-12-24 RX ORDER — IBUPROFEN 200 MG
200-400 TABLET ORAL EVERY 6 HOURS PRN
COMMUNITY
End: 2022-10-25

## 2019-01-03 ENCOUNTER — ONCOLOGY VISIT (OUTPATIENT)
Dept: ONCOLOGY | Facility: CLINIC | Age: 59
End: 2019-01-03
Attending: INTERNAL MEDICINE
Payer: COMMERCIAL

## 2019-01-03 VITALS
SYSTOLIC BLOOD PRESSURE: 134 MMHG | HEIGHT: 69 IN | WEIGHT: 206.3 LBS | OXYGEN SATURATION: 96 % | RESPIRATION RATE: 20 BRPM | BODY MASS INDEX: 30.56 KG/M2 | DIASTOLIC BLOOD PRESSURE: 88 MMHG | HEART RATE: 91 BPM | TEMPERATURE: 98.3 F

## 2019-01-03 DIAGNOSIS — Z78.9 ALCOHOL USE: ICD-10-CM

## 2019-01-03 DIAGNOSIS — F17.200 SMOKER: ICD-10-CM

## 2019-01-03 DIAGNOSIS — D75.1 ERYTHROCYTOSIS: Primary | ICD-10-CM

## 2019-01-03 PROCEDURE — G0463 HOSPITAL OUTPT CLINIC VISIT: HCPCS

## 2019-01-03 PROCEDURE — 99214 OFFICE O/P EST MOD 30 MIN: CPT | Performed by: INTERNAL MEDICINE

## 2019-01-03 ASSESSMENT — PAIN SCALES - GENERAL: PAINLEVEL: NO PAIN (0)

## 2019-01-03 ASSESSMENT — MIFFLIN-ST. JEOR: SCORE: 1581.18

## 2019-01-03 NOTE — PROGRESS NOTES
"HEMATOLOGY  FOLLOW UP VISIT     REQUESTING PHYSICIAN:  Dr. More Brooks      CHIEF COMPLAINT/REASON FOR VISIT:  Erythrocytosis.      HISTORY OF PRESENT ILLNESS:  [age] years old.  She presented to us December 2018 at age 58 with the blood count from her OB/GYN's office back in October 2018 identified hemoglobin of 16.2, hematocrit of 48.9 with normal white count, platelet and differential.  Sed rate and C-reactive protein were also normal at that time.      According to the patient, this is the first time she has high hemoglobin through her GYN annual visit lab work.  She is a lifetime smoker, 1-1.5 pack per day since high school.  She also drinks alcohol.    She has been undergoing a lot of stress since fall 2018 with first rash treated with antiviral and then \"clicking sound of her left hip.\"  She had a hip replacement before and found to have high metal content in the blood through her primary care doctor, likely released from her replaced hip.  She is going to have left hip revision.      She denied any night sweat, weight loss, pruritus changes on the skin.        She had baseline hemoglobin of 9 in  2009 in our system, so this is new mild erythrocytosis presented for this patient at age 58 in October 2018.      MEDICATIONS:  Reviewed in Epic system.        OTHER MEDICAL PROBLEMS:  Otherwise not significant.  She had a left hip replacement, and she has depression on Zoloft and anxiety.      SOCIAL HISTORY:  She is a lifetime smoker as above, 1-1.5 pack per day.  Chronic alcohol use, drinks beer every day.      FAMILY HISTORY:  Positive for grandmother had ovarian cancer.  No family history cytosis on the blood.      REVIEW OF SYSTEMS:     GENERAL: She is anxious.  No weight loss.  No B symptoms.     NEURO:   No headache, double vision, or focal weakness.  No neuropathy.   SKIN:  No chronic skin rash or skin infection.   CARDIOVASCULAR:  No palpitations, no chest pressure, no dyspnea on exertion. " "  PULMONARY:  No shortness of breath, no pleurisy, no cough, no hemoptysis.   GI:  No abdominal pain, nausea, vomiting, constipation.  No diarrhea.  No bright red blood per rectum.   :  No urgency, frequency.  No recurrent urinary tract infection.  No kidney problems.   RHEUMATOLOGY/MUSCULOSKELETAL SYSTEM:  No arthritis.  No joint pain.  No muscle ache.   ENDOCRINE:  No history of diabetes or thyroid problem.  No complaints of hot flashes.   HEMATOLOGY:  She had mild new erythrocytosis found in October 2018.     IMMUNOLOGY:  No recurrent fever or chills episode.  No recurrent infectious episode.   BREASTS/GYN:  No history of vaginal bleeding/discharge/dryness.  No breast pain or nipple discharge.       PHYSICAL EXAMINATION:   VITAL SIGNS:  Blood pressure 134/88, pulse 91, temperature 98.3  F (36.8  C), temperature source Tympanic, resp. rate 20, height 1.746 m (5' 8.75\"), weight 93.6 kg (206 lb 4.8 oz), SpO2 96 %, not currently breastfeeding.    ECOG status: 0.     GENERAL APPEARANCE:  Middle-aged woman looks like her stated age.  She has pleothorac face.  She is anxious.  She talks fast.     HEENT: The patient is normocephalic, atraumatic. Pupils are equally reactive to light.  Sclerae are anicteric.  Moist oral mucosa.  Negative pharynx.  No oral thrush.   NECK:  Supple.  No jugular venous distention.  Thyroid is not palpable.   LYMPH NODES:  Superficial lymphadenopathy is not appreciable in the bilateral cervical, supraclavicular, axillary or inguinal areas.   CARDIOVASCULAR:  S1, S2 regular with no murmurs or gallops.  No carotid or abdominal bruits.   PULMONARY:  Lungs are clear to auscultation and percussion bilaterally.  There is no wheezing or rhonchi.   GASTROINTESTINAL:  Abdomen is soft, nontender.  No hepatosplenomegaly.  No signs of ascites.  No mass appreciable.   MUSCULOSKELETAL/EXTREMITIES:  No edema.  No cyanotic changes.  No signs of joint deformity.  No lymphedema.  No spinal or paraspinal " tenderness.  No CVA tenderness.   NEUROLOGIC:  Cranial nerves II-XII are grossly intact.  Sensation intact.  Muscle strength and muscle tone symmetrical, 5/5 throughout.   SKIN:  No petechiae.  No rash.  No signs of cellulitis.       CURRENT LABORATORY DATA reviewed  12/2018   NGS for MPD: negative  epo level at 5  Cbc diff/CMP are nl.     outside from OB/GYN's office 10/2018 -  hemoglobin of 16.2, hematocrit of 48.9 with normal white count, platelet and differential.      Sed rate and C-reactive protein were also normal at that time.      CURRENT IMAGE DATA:  No current image data.        OLD DATA REVIEWED WITH SUMMARY:    Baseline hemoglobin in our system was 9.0.    MRI of lumbar spine April 2009, [facility].      ASSESSMENT AND PLAN:   At age 58, she was found to have mild new erythrocytosis with hematocrit of 48.9 with normal white count, differential, and platelet count.  She is a lifetime smoker.  She also drank alcohol every day.  She has been going through a lot of stress.      1.  Top differential is smoking and stress-related erythrocytosis reactive, with negative hem work up in 12/2018. No evidence of blood clonal disease now.       2.  Lifetime smoker.  Smoking cessation is advised.     3.  Alcohol overuse.  Alcohol cessation is advised.

## 2019-01-03 NOTE — LETTER
"    1/3/2019         RE: Ai Boyer  [id number]  [facility] [address]        Dear Colleague,    Thank you for referring your patient, Ai Boyer, to the [facility]. Please see a copy of my visit note below.    HEMATOLOGY  FOLLOW UP VISIT     REQUESTING PHYSICIAN:  Dr. More Brooks      CHIEF COMPLAINT/REASON FOR VISIT:  Erythrocytosis.      HISTORY OF PRESENT ILLNESS:  [age] years old.  She presented to us December 2018 at age 58 with the blood count from her OB/GYN's office back in October 2018 identified hemoglobin of 16.2, hematocrit of 48.9 with normal white count, platelet and differential.  Sed rate and C-reactive protein were also normal at that time.      According to the patient, this is the first time she has high hemoglobin through her GYN annual visit lab work.  She is a lifetime smoker, 1-1.5 pack per day since high school.  She also drinks alcohol.    She has been undergoing a lot of stress since fall 2018 with first rash treated with antiviral and then \"clicking sound of her left hip.\"  She had a hip replacement before and found to have high metal content in the blood through her primary care doctor, likely released from her replaced hip.  She is going to have left hip revision.      She denied any night sweat, weight loss, pruritus changes on the skin.        She had baseline hemoglobin of 9 in  2009 in our system, so this is new mild erythrocytosis presented for this patient at age 58 in October 2018.      MEDICATIONS:  Reviewed in Epic system.        OTHER MEDICAL PROBLEMS:  Otherwise not significant.  She had a left hip replacement, and she has depression on Zoloft and anxiety.      SOCIAL HISTORY:  She is a lifetime smoker as above, 1-1.5 pack per day.  Chronic alcohol use, drinks beer every day.      FAMILY HISTORY:  Positive for grandmother had ovarian cancer.  No family history cytosis on the blood.      REVIEW OF SYSTEMS:     GENERAL: She is anxious.  No " "weight loss.  No B symptoms.     NEURO:   No headache, double vision, or focal weakness.  No neuropathy.   SKIN:  No chronic skin rash or skin infection.   CARDIOVASCULAR:  No palpitations, no chest pressure, no dyspnea on exertion.   PULMONARY:  No shortness of breath, no pleurisy, no cough, no hemoptysis.   GI:  No abdominal pain, nausea, vomiting, constipation.  No diarrhea.  No bright red blood per rectum.   :  No urgency, frequency.  No recurrent urinary tract infection.  No kidney problems.   RHEUMATOLOGY/MUSCULOSKELETAL SYSTEM:  No arthritis.  No joint pain.  No muscle ache.   ENDOCRINE:  No history of diabetes or thyroid problem.  No complaints of hot flashes.   HEMATOLOGY:  She had mild new erythrocytosis found in October 2018.     IMMUNOLOGY:  No recurrent fever or chills episode.  No recurrent infectious episode.   BREASTS/GYN:  No history of vaginal bleeding/discharge/dryness.  No breast pain or nipple discharge.       PHYSICAL EXAMINATION:   VITAL SIGNS:  Blood pressure 134/88, pulse 91, temperature 98.3  F (36.8  C), temperature source Tympanic, resp. rate 20, height 1.746 m (5' 8.75\"), weight 93.6 kg (206 lb 4.8 oz), SpO2 96 %, not currently breastfeeding.    ECOG status: 0.     GENERAL APPEARANCE:  Middle-aged woman looks like her stated age.  She has pleothorac face.  She is anxious.  She talks fast.     HEENT: The patient is normocephalic, atraumatic. Pupils are equally reactive to light.  Sclerae are anicteric.  Moist oral mucosa.  Negative pharynx.  No oral thrush.   NECK:  Supple.  No jugular venous distention.  Thyroid is not palpable.   LYMPH NODES:  Superficial lymphadenopathy is not appreciable in the bilateral cervical, supraclavicular, axillary or inguinal areas.   CARDIOVASCULAR:  S1, S2 regular with no murmurs or gallops.  No carotid or abdominal bruits.   PULMONARY:  Lungs are clear to auscultation and percussion bilaterally.  There is no wheezing or rhonchi.   GASTROINTESTINAL:  " Abdomen is soft, nontender.  No hepatosplenomegaly.  No signs of ascites.  No mass appreciable.   MUSCULOSKELETAL/EXTREMITIES:  No edema.  No cyanotic changes.  No signs of joint deformity.  No lymphedema.  No spinal or paraspinal tenderness.  No CVA tenderness.   NEUROLOGIC:  Cranial nerves II-XII are grossly intact.  Sensation intact.  Muscle strength and muscle tone symmetrical, 5/5 throughout.   SKIN:  No petechiae.  No rash.  No signs of cellulitis.       CURRENT LABORATORY DATA reviewed  12/2018   NGS for MPD: negative  epo level at 5  Cbc diff/CMP are nl.     outside from OB/GYN's office 10/2018 -  hemoglobin of 16.2, hematocrit of 48.9 with normal white count, platelet and differential.      Sed rate and C-reactive protein were also normal at that time.      CURRENT IMAGE DATA:  No current image data.        OLD DATA REVIEWED WITH SUMMARY:    Baseline hemoglobin in our system was 9.0.    MRI of lumbar spine April 2009, [facility].      ASSESSMENT AND PLAN:   At age 58, she was found to have mild new erythrocytosis with hematocrit of 48.9 with normal white count, differential, and platelet count.  She is a lifetime smoker.  She also drank alcohol every day.  She has been going through a lot of stress.      1.  Top differential is smoking and stress-related erythrocytosis reactive, with negative hem work up in 12/2018. No evidence of blood clonal disease now.       2.  Lifetime smoker.  Smoking cessation is advised.     3.  Alcohol overuse.  Alcohol cessation is advised.             Again, thank you for allowing me to participate in the care of your patient.        Sincerely,        Marycarmen Palmer MD, MD

## 2019-01-03 NOTE — NURSING NOTE
"Oncology Rooming Note    January 3, 2019 2:13 PM   Ai Boyer is a 58 year old female who presents for:    Chief Complaint   Patient presents with     Hematology     Recheck Erythrocytosis, review Labs & US      Initial Vitals: /88 (BP Location: Right arm, Patient Position: Sitting, Cuff Size: Adult Regular)   Pulse 91   Temp 98.3  F (36.8  C) (Tympanic)   Resp 20   Ht 1.746 m (5' 8.75\")   Wt 93.6 kg (206 lb 4.8 oz)   SpO2 96%   BMI 30.69 kg/m   Estimated body mass index is 30.69 kg/m  as calculated from the following:    Height as of this encounter: 1.746 m (5' 8.75\").    Weight as of this encounter: 93.6 kg (206 lb 4.8 oz). Body surface area is 2.13 meters squared.  No Pain (0) Comment: Data Unavailable   No LMP recorded. Patient is postmenopausal.  Allergies reviewed: Yes  Medications reviewed: Yes    Medications: Medication refills not needed today.  Pharmacy name entered into [facility]: [facility] [place], [place] - [address] [place] [address]    Clinical concerns: Recheck Erythrocytosis, review Labs & US     7  minutes for nursing intake (face to face time)     Bailee Mcginnis CMA              "

## 2019-01-10 ENCOUNTER — ANESTHESIA EVENT (OUTPATIENT)
Dept: SURGERY | Facility: CLINIC | Age: 59
DRG: 468 | End: 2019-01-10
Payer: COMMERCIAL

## 2019-01-15 NOTE — PHARMACY-ADMISSION MEDICATION HISTORY
Admission medication history interview status for this patient is complete. See [facility] admission navigator for allergy information, prior to admission medications and immunization status.     PTA med list reviewed by pre-admitting nurse,   Nurse Grace Lynne RN Mon Dec 24, 2018  9:47 AM     Pharmacist called to clarify Wellbutrin XL dose and frequency. Of note, patient states that she takes nicotine gum and patches as needed but did not state the strengths.     Prior to Admission medications    Medication Sig Last Dose Taking? Auth Provider   buPROPion (WELLBUTRIN XL) 150 MG 24 hr tablet 150 mg daily   Yes Reported, Patient   Cholecalciferol (VITAMIN D3 PO) Take 5,000 Units by mouth daily   Yes Reported, Patient   ibuprofen (ADVIL/MOTRIN) 200 MG tablet Take 200-400 mg by mouth every 6 hours as needed for mild pain  Yes Reported, Patient   order for DME Equipment being ordered: casey  Patient not taking: Reported on 12/4/2018   Lita Diaz APRN CNP

## 2019-01-17 ENCOUNTER — APPOINTMENT (OUTPATIENT)
Dept: PHYSICAL THERAPY | Facility: CLINIC | Age: 59
DRG: 468 | End: 2019-01-17
Attending: ORTHOPAEDIC SURGERY
Payer: COMMERCIAL

## 2019-01-17 ENCOUNTER — HOSPITAL ENCOUNTER (INPATIENT)
Facility: CLINIC | Age: 59
LOS: 3 days | Discharge: HOME HEALTH | DRG: 468 | End: 2019-01-20
Attending: ORTHOPAEDIC SURGERY | Admitting: ORTHOPAEDIC SURGERY
Payer: COMMERCIAL

## 2019-01-17 ENCOUNTER — ANESTHESIA (OUTPATIENT)
Dept: SURGERY | Facility: CLINIC | Age: 59
DRG: 468 | End: 2019-01-17
Payer: COMMERCIAL

## 2019-01-17 DIAGNOSIS — Z96.649 S/P REVISION OF TOTAL HIP: Primary | ICD-10-CM

## 2019-01-17 LAB
ABO + RH BLD: NORMAL
ABO + RH BLD: NORMAL
BLD GP AB SCN SERPL QL: NORMAL
BLOOD BANK CMNT PATIENT-IMP: NORMAL
HGB BLD-MCNC: 15.5 G/DL (ref 11.7–15.7)
SPECIMEN EXP DATE BLD: NORMAL

## 2019-01-17 PROCEDURE — 87075 CULTR BACTERIA EXCEPT BLOOD: CPT | Performed by: ORTHOPAEDIC SURGERY

## 2019-01-17 PROCEDURE — C1776 JOINT DEVICE (IMPLANTABLE): HCPCS | Performed by: ORTHOPAEDIC SURGERY

## 2019-01-17 PROCEDURE — 25000128 H RX IP 250 OP 636: Performed by: ORTHOPAEDIC SURGERY

## 2019-01-17 PROCEDURE — 88300 SURGICAL PATH GROSS: CPT | Performed by: ORTHOPAEDIC SURGERY

## 2019-01-17 PROCEDURE — 0SRB04A REPLACEMENT OF LEFT HIP JOINT WITH CERAMIC ON POLYETHYLENE SYNTHETIC SUBSTITUTE, UNCEMENTED, OPEN APPROACH: ICD-10-PCS | Performed by: ORTHOPAEDIC SURGERY

## 2019-01-17 PROCEDURE — 0QU50KZ SUPPLEMENT LEFT ACETABULUM WITH NONAUTOLOGOUS TISSUE SUBSTITUTE, OPEN APPROACH: ICD-10-PCS | Performed by: ORTHOPAEDIC SURGERY

## 2019-01-17 PROCEDURE — 25000128 H RX IP 250 OP 636: Performed by: ANESTHESIOLOGY

## 2019-01-17 PROCEDURE — 36000067 ZZH SURGERY LEVEL 5 1ST 30 MIN: Performed by: ORTHOPAEDIC SURGERY

## 2019-01-17 PROCEDURE — 25000132 ZZH RX MED GY IP 250 OP 250 PS 637: Performed by: ANESTHESIOLOGY

## 2019-01-17 PROCEDURE — 36415 COLL VENOUS BLD VENIPUNCTURE: CPT | Performed by: ANESTHESIOLOGY

## 2019-01-17 PROCEDURE — 87205 SMEAR GRAM STAIN: CPT | Performed by: ORTHOPAEDIC SURGERY

## 2019-01-17 PROCEDURE — 85018 HEMOGLOBIN: CPT | Performed by: ANESTHESIOLOGY

## 2019-01-17 PROCEDURE — 87070 CULTURE OTHR SPECIMN AEROBIC: CPT | Performed by: ORTHOPAEDIC SURGERY

## 2019-01-17 PROCEDURE — 25000128 H RX IP 250 OP 636: Performed by: NURSE ANESTHETIST, CERTIFIED REGISTERED

## 2019-01-17 PROCEDURE — 0QP504Z REMOVAL OF INTERNAL FIXATION DEVICE FROM LEFT ACETABULUM, OPEN APPROACH: ICD-10-PCS | Performed by: ORTHOPAEDIC SURGERY

## 2019-01-17 PROCEDURE — 25000132 ZZH RX MED GY IP 250 OP 250 PS 637: Performed by: ORTHOPAEDIC SURGERY

## 2019-01-17 PROCEDURE — 97161 PT EVAL LOW COMPLEX 20 MIN: CPT | Mod: GP | Performed by: PHYSICAL THERAPIST

## 2019-01-17 PROCEDURE — 0SPB0JZ REMOVAL OF SYNTHETIC SUBSTITUTE FROM LEFT HIP JOINT, OPEN APPROACH: ICD-10-PCS | Performed by: ORTHOPAEDIC SURGERY

## 2019-01-17 PROCEDURE — 36000069 ZZH SURGERY LEVEL 5 EA 15 ADDTL MIN: Performed by: ORTHOPAEDIC SURGERY

## 2019-01-17 PROCEDURE — 97530 THERAPEUTIC ACTIVITIES: CPT | Mod: GP | Performed by: PHYSICAL THERAPIST

## 2019-01-17 PROCEDURE — 99222 1ST HOSP IP/OBS MODERATE 55: CPT | Performed by: NURSE PRACTITIONER

## 2019-01-17 PROCEDURE — 40000306 ZZH STATISTIC PRE PROC ASSESS II: Performed by: ORTHOPAEDIC SURGERY

## 2019-01-17 PROCEDURE — 86900 BLOOD TYPING SEROLOGIC ABO: CPT | Performed by: ANESTHESIOLOGY

## 2019-01-17 PROCEDURE — 71000013 ZZH RECOVERY PHASE 1 LEVEL 1 EA ADDTL HR: Performed by: ORTHOPAEDIC SURGERY

## 2019-01-17 PROCEDURE — 27211024 ZZHC OR SUPPLY OTHER OPNP: Performed by: ORTHOPAEDIC SURGERY

## 2019-01-17 PROCEDURE — 25800025 ZZH RX 258: Performed by: ORTHOPAEDIC SURGERY

## 2019-01-17 PROCEDURE — 25000125 ZZHC RX 250: Performed by: NURSE ANESTHETIST, CERTIFIED REGISTERED

## 2019-01-17 PROCEDURE — 86901 BLOOD TYPING SEROLOGIC RH(D): CPT | Performed by: ANESTHESIOLOGY

## 2019-01-17 PROCEDURE — 12000000 ZZH R&B MED SURG/OB

## 2019-01-17 PROCEDURE — 88300 SURGICAL PATH GROSS: CPT | Mod: 26 | Performed by: ORTHOPAEDIC SURGERY

## 2019-01-17 PROCEDURE — 37000009 ZZH ANESTHESIA TECHNICAL FEE, EACH ADDTL 15 MIN: Performed by: ORTHOPAEDIC SURGERY

## 2019-01-17 PROCEDURE — 25000128 H RX IP 250 OP 636: Performed by: PHYSICIAN ASSISTANT

## 2019-01-17 PROCEDURE — 86850 RBC ANTIBODY SCREEN: CPT | Performed by: ANESTHESIOLOGY

## 2019-01-17 PROCEDURE — 71000012 ZZH RECOVERY PHASE 1 LEVEL 1 FIRST HR: Performed by: ORTHOPAEDIC SURGERY

## 2019-01-17 PROCEDURE — C1762 CONN TISS, HUMAN(INC FASCIA): HCPCS | Performed by: ORTHOPAEDIC SURGERY

## 2019-01-17 PROCEDURE — 27210794 ZZH OR GENERAL SUPPLY STERILE: Performed by: ORTHOPAEDIC SURGERY

## 2019-01-17 PROCEDURE — 40000193 ZZH STATISTIC PT WARD VISIT: Performed by: PHYSICAL THERAPIST

## 2019-01-17 PROCEDURE — C1713 ANCHOR/SCREW BN/BN,TIS/BN: HCPCS | Performed by: ORTHOPAEDIC SURGERY

## 2019-01-17 PROCEDURE — 25000125 ZZHC RX 250: Performed by: PHYSICIAN ASSISTANT

## 2019-01-17 PROCEDURE — 37000008 ZZH ANESTHESIA TECHNICAL FEE, 1ST 30 MIN: Performed by: ORTHOPAEDIC SURGERY

## 2019-01-17 DEVICE — IMPLANTABLE DEVICE: Type: IMPLANTABLE DEVICE | Site: HIP | Status: FUNCTIONAL

## 2019-01-17 DEVICE — GRAFT BONE CHIPS CANC 30ML 400150: Type: IMPLANTABLE DEVICE | Site: HIP | Status: FUNCTIONAL

## 2019-01-17 DEVICE — GRAFT BONE CRUSH CANC 30ML 400080: Type: IMPLANTABLE DEVICE | Site: HIP | Status: FUNCTIONAL

## 2019-01-17 RX ORDER — AMOXICILLIN 250 MG
2 CAPSULE ORAL 2 TIMES DAILY
Status: DISCONTINUED | OUTPATIENT
Start: 2019-01-17 | End: 2019-01-20 | Stop reason: HOSPADM

## 2019-01-17 RX ORDER — LABETALOL HYDROCHLORIDE 5 MG/ML
INJECTION, SOLUTION INTRAVENOUS PRN
Status: DISCONTINUED | OUTPATIENT
Start: 2019-01-17 | End: 2019-01-17

## 2019-01-17 RX ORDER — NEOSTIGMINE METHYLSULFATE 1 MG/ML
VIAL (ML) INJECTION PRN
Status: DISCONTINUED | OUTPATIENT
Start: 2019-01-17 | End: 2019-01-17

## 2019-01-17 RX ORDER — LIDOCAINE 40 MG/G
CREAM TOPICAL
Status: DISCONTINUED | OUTPATIENT
Start: 2019-01-17 | End: 2019-01-20 | Stop reason: HOSPADM

## 2019-01-17 RX ORDER — AMOXICILLIN 250 MG
1 CAPSULE ORAL 2 TIMES DAILY
Status: DISCONTINUED | OUTPATIENT
Start: 2019-01-17 | End: 2019-01-20 | Stop reason: HOSPADM

## 2019-01-17 RX ORDER — HYDROMORPHONE HYDROCHLORIDE 2 MG/1
2-4 TABLET ORAL
Status: DISCONTINUED | OUTPATIENT
Start: 2019-01-17 | End: 2019-01-20 | Stop reason: HOSPADM

## 2019-01-17 RX ORDER — LIDOCAINE HYDROCHLORIDE 10 MG/ML
INJECTION, SOLUTION INFILTRATION; PERINEURAL PRN
Status: DISCONTINUED | OUTPATIENT
Start: 2019-01-17 | End: 2019-01-17

## 2019-01-17 RX ORDER — ONDANSETRON 4 MG/1
4 TABLET, ORALLY DISINTEGRATING ORAL EVERY 30 MIN PRN
Status: DISCONTINUED | OUTPATIENT
Start: 2019-01-17 | End: 2019-01-17 | Stop reason: HOSPADM

## 2019-01-17 RX ORDER — HYDROMORPHONE HYDROCHLORIDE 1 MG/ML
.3-.5 INJECTION, SOLUTION INTRAMUSCULAR; INTRAVENOUS; SUBCUTANEOUS EVERY 5 MIN PRN
Status: DISCONTINUED | OUTPATIENT
Start: 2019-01-17 | End: 2019-01-17 | Stop reason: HOSPADM

## 2019-01-17 RX ORDER — SODIUM CHLORIDE, SODIUM LACTATE, POTASSIUM CHLORIDE, CALCIUM CHLORIDE 600; 310; 30; 20 MG/100ML; MG/100ML; MG/100ML; MG/100ML
INJECTION, SOLUTION INTRAVENOUS CONTINUOUS
Status: DISCONTINUED | OUTPATIENT
Start: 2019-01-17 | End: 2019-01-17 | Stop reason: HOSPADM

## 2019-01-17 RX ORDER — GLYCOPYRROLATE 0.2 MG/ML
INJECTION, SOLUTION INTRAMUSCULAR; INTRAVENOUS PRN
Status: DISCONTINUED | OUTPATIENT
Start: 2019-01-17 | End: 2019-01-17

## 2019-01-17 RX ORDER — TIZANIDINE 2 MG/1
2-4 TABLET ORAL 3 TIMES DAILY PRN
Status: DISCONTINUED | OUTPATIENT
Start: 2019-01-17 | End: 2019-01-20 | Stop reason: HOSPADM

## 2019-01-17 RX ORDER — HYDROXYZINE HYDROCHLORIDE 25 MG/1
25 TABLET, FILM COATED ORAL EVERY 6 HOURS PRN
Status: DISCONTINUED | OUTPATIENT
Start: 2019-01-17 | End: 2019-01-20 | Stop reason: HOSPADM

## 2019-01-17 RX ORDER — HYDROMORPHONE HYDROCHLORIDE 1 MG/ML
.3-.5 INJECTION, SOLUTION INTRAMUSCULAR; INTRAVENOUS; SUBCUTANEOUS
Status: DISCONTINUED | OUTPATIENT
Start: 2019-01-17 | End: 2019-01-20 | Stop reason: HOSPADM

## 2019-01-17 RX ORDER — PROPOFOL 10 MG/ML
INJECTION, EMULSION INTRAVENOUS CONTINUOUS PRN
Status: DISCONTINUED | OUTPATIENT
Start: 2019-01-17 | End: 2019-01-17

## 2019-01-17 RX ORDER — BUPROPION HYDROCHLORIDE 150 MG/1
150 TABLET ORAL DAILY
Status: DISCONTINUED | OUTPATIENT
Start: 2019-01-17 | End: 2019-01-20 | Stop reason: HOSPADM

## 2019-01-17 RX ORDER — NALOXONE HYDROCHLORIDE 0.4 MG/ML
.1-.4 INJECTION, SOLUTION INTRAMUSCULAR; INTRAVENOUS; SUBCUTANEOUS
Status: ACTIVE | OUTPATIENT
Start: 2019-01-17 | End: 2019-01-18

## 2019-01-17 RX ORDER — ACETAMINOPHEN 325 MG/1
650 TABLET ORAL EVERY 4 HOURS PRN
Status: DISCONTINUED | OUTPATIENT
Start: 2019-01-20 | End: 2019-01-20 | Stop reason: HOSPADM

## 2019-01-17 RX ORDER — ONDANSETRON 2 MG/ML
INJECTION INTRAMUSCULAR; INTRAVENOUS PRN
Status: DISCONTINUED | OUTPATIENT
Start: 2019-01-17 | End: 2019-01-17

## 2019-01-17 RX ORDER — FENTANYL CITRATE 50 UG/ML
25-50 INJECTION, SOLUTION INTRAMUSCULAR; INTRAVENOUS
Status: DISCONTINUED | OUTPATIENT
Start: 2019-01-17 | End: 2019-01-17 | Stop reason: HOSPADM

## 2019-01-17 RX ORDER — NALOXONE HYDROCHLORIDE 0.4 MG/ML
.1-.4 INJECTION, SOLUTION INTRAMUSCULAR; INTRAVENOUS; SUBCUTANEOUS
Status: DISCONTINUED | OUTPATIENT
Start: 2019-01-17 | End: 2019-01-20 | Stop reason: HOSPADM

## 2019-01-17 RX ORDER — ONDANSETRON 2 MG/ML
4 INJECTION INTRAMUSCULAR; INTRAVENOUS EVERY 6 HOURS PRN
Status: DISCONTINUED | OUTPATIENT
Start: 2019-01-17 | End: 2019-01-20 | Stop reason: HOSPADM

## 2019-01-17 RX ORDER — PROPOFOL 10 MG/ML
INJECTION, EMULSION INTRAVENOUS PRN
Status: DISCONTINUED | OUTPATIENT
Start: 2019-01-17 | End: 2019-01-17

## 2019-01-17 RX ORDER — LIDOCAINE 40 MG/G
CREAM TOPICAL
Status: DISCONTINUED | OUTPATIENT
Start: 2019-01-17 | End: 2019-01-17 | Stop reason: HOSPADM

## 2019-01-17 RX ORDER — FENTANYL CITRATE 50 UG/ML
INJECTION, SOLUTION INTRAMUSCULAR; INTRAVENOUS PRN
Status: DISCONTINUED | OUTPATIENT
Start: 2019-01-17 | End: 2019-01-17

## 2019-01-17 RX ORDER — CITRIC ACID/SODIUM CITRATE 334-500MG
30 SOLUTION, ORAL ORAL ONCE
Status: COMPLETED | OUTPATIENT
Start: 2019-01-17 | End: 2019-01-17

## 2019-01-17 RX ORDER — SODIUM CHLORIDE, SODIUM LACTATE, POTASSIUM CHLORIDE, CALCIUM CHLORIDE 600; 310; 30; 20 MG/100ML; MG/100ML; MG/100ML; MG/100ML
INJECTION, SOLUTION INTRAVENOUS CONTINUOUS
Status: DISCONTINUED | OUTPATIENT
Start: 2019-01-17 | End: 2019-01-20 | Stop reason: HOSPADM

## 2019-01-17 RX ORDER — ACETAMINOPHEN 325 MG/1
975 TABLET ORAL EVERY 8 HOURS
Status: COMPLETED | OUTPATIENT
Start: 2019-01-17 | End: 2019-01-20

## 2019-01-17 RX ORDER — ONDANSETRON 2 MG/ML
4 INJECTION INTRAMUSCULAR; INTRAVENOUS EVERY 30 MIN PRN
Status: DISCONTINUED | OUTPATIENT
Start: 2019-01-17 | End: 2019-01-17 | Stop reason: HOSPADM

## 2019-01-17 RX ORDER — ONDANSETRON 4 MG/1
4 TABLET, ORALLY DISINTEGRATING ORAL EVERY 6 HOURS PRN
Status: DISCONTINUED | OUTPATIENT
Start: 2019-01-17 | End: 2019-01-20 | Stop reason: HOSPADM

## 2019-01-17 RX ADMIN — FENTANYL CITRATE 50 MCG: 50 INJECTION, SOLUTION INTRAMUSCULAR; INTRAVENOUS at 12:13

## 2019-01-17 RX ADMIN — ROCURONIUM BROMIDE 10 MG: 10 INJECTION INTRAVENOUS at 08:45

## 2019-01-17 RX ADMIN — HYDROMORPHONE HYDROCHLORIDE 0.5 MG: 1 INJECTION, SOLUTION INTRAMUSCULAR; INTRAVENOUS; SUBCUTANEOUS at 09:15

## 2019-01-17 RX ADMIN — CHOLECALCIFEROL CAP 125 MCG (5000 UNIT) 5000 UNITS: 125 CAP at 14:26

## 2019-01-17 RX ADMIN — SODIUM CHLORIDE, POTASSIUM CHLORIDE, SODIUM LACTATE AND CALCIUM CHLORIDE: 600; 310; 30; 20 INJECTION, SOLUTION INTRAVENOUS at 14:10

## 2019-01-17 RX ADMIN — HYDROMORPHONE HYDROCHLORIDE 2 MG: 2 TABLET ORAL at 14:27

## 2019-01-17 RX ADMIN — Medication 1 G: at 07:53

## 2019-01-17 RX ADMIN — LIDOCAINE HYDROCHLORIDE 50 MG: 10 INJECTION, SOLUTION INFILTRATION; PERINEURAL at 07:57

## 2019-01-17 RX ADMIN — HYDROMORPHONE HYDROCHLORIDE 1 MG: 1 INJECTION, SOLUTION INTRAMUSCULAR; INTRAVENOUS; SUBCUTANEOUS at 08:20

## 2019-01-17 RX ADMIN — HYDROXYZINE HYDROCHLORIDE 25 MG: 25 TABLET ORAL at 20:56

## 2019-01-17 RX ADMIN — MIDAZOLAM 2 MG: 1 INJECTION INTRAMUSCULAR; INTRAVENOUS at 07:44

## 2019-01-17 RX ADMIN — Medication 1 G: at 10:58

## 2019-01-17 RX ADMIN — ACETAMINOPHEN 975 MG: 325 TABLET, FILM COATED ORAL at 14:26

## 2019-01-17 RX ADMIN — SODIUM CHLORIDE, POTASSIUM CHLORIDE, SODIUM LACTATE AND CALCIUM CHLORIDE: 600; 310; 30; 20 INJECTION, SOLUTION INTRAVENOUS at 07:44

## 2019-01-17 RX ADMIN — SODIUM CITRATE AND CITRIC ACID MONOHYDRATE 30 ML: 500; 334 SOLUTION ORAL at 06:34

## 2019-01-17 RX ADMIN — HYDROMORPHONE HYDROCHLORIDE 2 MG: 2 TABLET ORAL at 20:56

## 2019-01-17 RX ADMIN — FENTANYL CITRATE 150 MCG: 50 INJECTION, SOLUTION INTRAMUSCULAR; INTRAVENOUS at 07:48

## 2019-01-17 RX ADMIN — HYDROMORPHONE HYDROCHLORIDE 1 MG: 1 INJECTION, SOLUTION INTRAMUSCULAR; INTRAVENOUS; SUBCUTANEOUS at 11:20

## 2019-01-17 RX ADMIN — Medication 3 MG: at 11:20

## 2019-01-17 RX ADMIN — VANCOMYCIN HYDROCHLORIDE 1500 MG: 10 INJECTION, POWDER, LYOPHILIZED, FOR SOLUTION INTRAVENOUS at 06:35

## 2019-01-17 RX ADMIN — ROCURONIUM BROMIDE 10 MG: 10 INJECTION INTRAVENOUS at 09:00

## 2019-01-17 RX ADMIN — GLYCOPYRROLATE 0.4 MG: 0.2 INJECTION, SOLUTION INTRAMUSCULAR; INTRAVENOUS at 11:20

## 2019-01-17 RX ADMIN — FENTANYL CITRATE 100 MCG: 50 INJECTION, SOLUTION INTRAMUSCULAR; INTRAVENOUS at 07:57

## 2019-01-17 RX ADMIN — STANDARDIZED SENNA CONCENTRATE AND DOCUSATE SODIUM 1 TABLET: 8.6; 5 TABLET, FILM COATED ORAL at 20:41

## 2019-01-17 RX ADMIN — LABETALOL HYDROCHLORIDE 15 MG: 5 INJECTION INTRAVENOUS at 11:20

## 2019-01-17 RX ADMIN — VANCOMYCIN HYDROCHLORIDE 1500 MG: 10 INJECTION, POWDER, LYOPHILIZED, FOR SOLUTION INTRAVENOUS at 20:41

## 2019-01-17 RX ADMIN — PROPOFOL 200 MG: 10 INJECTION, EMULSION INTRAVENOUS at 07:57

## 2019-01-17 RX ADMIN — GLYCOPYRROLATE 0.2 MG: 0.2 INJECTION, SOLUTION INTRAMUSCULAR; INTRAVENOUS at 07:57

## 2019-01-17 RX ADMIN — BUPROPION HYDROCHLORIDE 150 MG: 150 TABLET, FILM COATED, EXTENDED RELEASE ORAL at 14:26

## 2019-01-17 RX ADMIN — ROCURONIUM BROMIDE 50 MG: 10 INJECTION INTRAVENOUS at 07:57

## 2019-01-17 RX ADMIN — SODIUM CHLORIDE, POTASSIUM CHLORIDE, SODIUM LACTATE AND CALCIUM CHLORIDE: 600; 310; 30; 20 INJECTION, SOLUTION INTRAVENOUS at 08:00

## 2019-01-17 RX ADMIN — ACETAMINOPHEN 975 MG: 325 TABLET, FILM COATED ORAL at 22:18

## 2019-01-17 RX ADMIN — ONDANSETRON 4 MG: 2 INJECTION INTRAMUSCULAR; INTRAVENOUS at 10:56

## 2019-01-17 RX ADMIN — Medication 1500 MG: at 07:44

## 2019-01-17 RX ADMIN — HYDROMORPHONE HYDROCHLORIDE 2 MG: 2 TABLET ORAL at 17:46

## 2019-01-17 RX ADMIN — PROPOFOL 50 MCG/KG/MIN: 10 INJECTION, EMULSION INTRAVENOUS at 08:00

## 2019-01-17 RX ADMIN — ONDANSETRON 4 MG: 2 INJECTION INTRAMUSCULAR; INTRAVENOUS at 15:11

## 2019-01-17 RX ADMIN — FENTANYL CITRATE 50 MCG: 50 INJECTION, SOLUTION INTRAMUSCULAR; INTRAVENOUS at 12:05

## 2019-01-17 RX ADMIN — SODIUM CHLORIDE, POTASSIUM CHLORIDE, SODIUM LACTATE AND CALCIUM CHLORIDE: 600; 310; 30; 20 INJECTION, SOLUTION INTRAVENOUS at 09:15

## 2019-01-17 RX ADMIN — HYDROMORPHONE HYDROCHLORIDE 0.5 MG: 1 INJECTION, SOLUTION INTRAMUSCULAR; INTRAVENOUS; SUBCUTANEOUS at 09:00

## 2019-01-17 ASSESSMENT — ACTIVITIES OF DAILY LIVING (ADL)
BATHING: 0-->INDEPENDENT
COGNITION: 0 - NO COGNITION ISSUES REPORTED
SWALLOWING: 0-->SWALLOWS FOODS/LIQUIDS WITHOUT DIFFICULTY
ADLS_ACUITY_SCORE: 17
DRESS: 0-->INDEPENDENT
AMBULATION: 0-->INDEPENDENT
RETIRED_EATING: 0-->INDEPENDENT
TOILETING: 0-->INDEPENDENT
ADLS_ACUITY_SCORE: 15
RETIRED_COMMUNICATION: 0-->UNDERSTANDS/COMMUNICATES WITHOUT DIFFICULTY
TRANSFERRING: 0-->INDEPENDENT
FALL_HISTORY_WITHIN_LAST_SIX_MONTHS: NO

## 2019-01-17 ASSESSMENT — MIFFLIN-ST. JEOR: SCORE: 1567.91

## 2019-01-17 ASSESSMENT — LIFESTYLE VARIABLES: TOBACCO_USE: 0

## 2019-01-17 NOTE — ANESTHESIA POSTPROCEDURE EVALUATION
Patient: Ai Boyer    Procedure(s):  Revision left total hip arthroplasty    Diagnosis:Total Hip Arthroplasty revision  Diagnosis Additional Information:   Brief Operative Note     Pre-operative diagnosis:         Total Hip Arthroplasty revision  Post-operative diagnosis        * No post-op diagnosis entered *  Procedure:      Procedure(s):  Revision left total hip arthroplasty        Anesthesia Type:  General, ETT    Note:  Anesthesia Post Evaluation    Patient location during evaluation: PACU  Patient participation: Able to fully participate in evaluation  Level of consciousness: awake  Pain management: adequate  Airway patency: patent  Cardiovascular status: acceptable  Respiratory status: acceptable  Hydration status: euvolemic  PONV: controlled     Anesthetic complications: None          Last vitals:  Vitals:    01/17/19 1315 01/17/19 1326 01/17/19 1405   BP: 122/75  118/67   Pulse: 68     Resp: 9  12   Temp: 97.7  F (36.5  C)  96.3  F (35.7  C)   SpO2: 98% 99% 98%         Electronically Signed By: Mariusz Draper MD  January 17, 2019  2:28 PM

## 2019-01-17 NOTE — PLAN OF CARE
Pt arrived from PACU at 1400. Pt A&Ox4. Informed of the call light and welcome folder. Pain managed with dilaudid and tylenol. Surgical dressing CDI. Baseline neuropathy, Bilateral foot drop. Absent D/P. Pressure sore on dorsal of left foot. Covered with foam dressing. Tolerating clear liquid. Carmona catheter patent. Capno in place, 2L/min, VSS

## 2019-01-17 NOTE — BRIEF OP NOTE
[facility]    Brief Operative Note    Pre-operative diagnosis: Total Hip Arthroplasty revision  Post-operative diagnosis * No post-op diagnosis entered *  Procedure: Procedure(s):  Revision left total hip arthroplasty  Surgeon: Surgeon(s) and Role:     * More Stephens MD - Primary     * Myles Wiggins PA-C - Assisting  Anesthesia: General   Estimated blood loss: None  Drains: None  Specimens:   ID Type Source Tests Collected by Time Destination   1 : left hip wound Wound Hip, Left ANAEROBIC BACTERIAL CULTURE, GRAM STAIN, WOUND CULTURE AEROBIC BACTERIAL More Stephens MD 1/17/2019  8:21 AM    A : Depuy Reeds Cup, metal liner, head Other (specify in comments) Hip, Left SURGICAL PATHOLOGY EXAM More Stephens MD 1/17/2019 10:16 AM      Findings:   None.  Complications: None.  Implants: Ortho implants  .

## 2019-01-17 NOTE — CONSULTS
[facility]  Pain Service Consultation   Text Page    Date of Admission:  1/17/2019    Assessment & Plan   Ai Boyer is a 58 year old female who was admitted on 1/17/2019. I was asked by More Stephens MD  to see the patient for pain management strategies. She is post left hip total arthroplasty POD 0     1)  Acute on chronic left hip pain  Acute postoperative pain in the setting of chronic hip pain increasing over the last 2 years     2)  Patient with chronic  pain, not on chronic opioid therapy    Baseline 0 mg Daily Morphine Equivalent as dispensed and as reported daily use.  Patient has no expected opioid tolerance.     Patient's opioid use in past 24 hours: dilaudid IV 3 mg, oral 2 mg  =  84 mg Daily Morphine Equivalent    3)  Risk factors for opioid related harms  -Had a tolerance at at one time, no longer expected to have that tolerance  -High opioid dose (>50 MME/day)  -remote history of THC in drug screen    4)  Opioid induced side-effects:  -Constipation  No last BM 1/16  -Nausea/Vomit nausea   -Sedation no   -Urinary Retention dela cruz    5)  Other/Related:    -Depression/anxiety anxiety 2013 on xanax prn  -Deconditioning likely   - obesity   -hx of illicit substance (THC in UDS) use in 2015 while under pain contract using Norco 10/325  -tobacco dependence -Wellbutrin offered Nicotine patch       PLAN:   1)   Explained pain plan to patient emphasizing multimodal analgesia and complimentary therapies   2)   No change in opioids at this time   3)Non-opioid multimodal medication therapy  -Topical:Once dressing removed left hip then start Voltaren 1% Topical Gel, 4 grams tid and Lidocaine Patch 4% at hs  -N-SAIDS:Avoid due to post op state unless cleared by surgical team  -Muscle Relaxants:Tizanidine  2-4 mg tid prn   -Adjuvants:Acetaminophen 975 mg every 8 hrs for 3 days  -Antidepresants/anxiolytics:Bupropion as chronic   4)  Non-medication interventions  Positioning, ICE, Relaxation,  Physical therapy, Brace  Left hip as directed by surgeon. Foot orthotics to discuss with surgeon   5)  Opioids:Hydromorphone 2-4 mg every 3 hrs prn, Dilaudid IV 0.3-0.5 mg every 3 hrs prn   Opioids Treatment Goal: -Improvement in function  -Participate in PT  -Post-operative management of pain, expected 3-7 days needed  6)  Constipation Prophylaxis    Continue to Monitor, Bowel Meds PRN per Constipation Order Set, Senna-S 1-2 bid prn   7)  Pain Education  -Opioid safe use, storage and disposal information included in DC AVS  8)  DC Planning   Discussed goal of Opioid therapy as above with   Length of therapy is less than 10 days, opioids may be stopped without taper.  Continued outpatient management of pain per surgical team if discharged with opioids   Disposition: home   Support systems:  Daughter   Outpatient Referrals: to be determined  The following risk factors have been identified for unintentional overdose: patient is a smoker and patient is not expected to have previous tolerance given gap in opioid use. Consider discharge with intra-nasal naloxone if discharged to home with opioids>50 MME.  Plan for education prior to discharge    Time Spent on this Encounter   I spent  20 minutes ( [id number]) in assessment of the patient, counseling and discussion with the patient and family as documented in sections below. Another 45  minutes in review of chart, documentation, coordination of care and discussion with the health care team.    Nicole MCKNIGHT CNP  Pain Management and Palliative Care  [facility]  Pgr: [phone]      Reason for Consult   Reason for consult: I was asked by  More Stephens MD  to evaluate this patient for acute postoperative pain management.    Primary Care Physician   Primary Care Physician:Mariusz Lindsey MD  Pain Specialist: none     Chief Complaint    left hip pain     History is obtained from the patient and electronic health record    History of  Present Illness   Ai Boyer is a 58 year old female who presents with increasing left hip pain. She is s/p left hip arthroplasty in 2009. The patient is here today s/p left hip arthroplasty.   She has a past medical history of left knee arthroscopy, right ankle fusion, bilateral foot drop, idiopathic peripheral neuropathy.    She is awake and alert with c/o dry mouth and nausea.  She reports pain at a mild to moderate level in the left hip. She states the left hip feels like a spasm.  She denies headache, dizziness, lightheadedness, chest pain, SOB. The patient is passing some flatus.  She also reports nausea, which she attributes to an empty stomach.   Patient is unclear as to how often she is to wear the left hip brace.  Orthotics and Physical Therapy in room discussing options with foot orthotic as wrapping she is doing to left foot causing chronic intermittent ulcerations.  She is concerned about getting back to work as soon as possible and can not wear the brace while driving.      CURRENT PAIN:  Her pain is located in the  Left hip  It is described as spasms  She rates it as ranging between 2 /10 and 3/10  The average is 2/10 on a scale of 0-10  Currently it is rated as 2/10  It improves by  Medication, rest   It worsens by  Movement   She  been compliant with the recommendations while in the hospital.      PAIN HISTORY:  The pain is mainly located in the  Left hip   It is described as spasms and catching   Rates it as ranging between 2/10 and 8/10  Currently it is rated as  2/10  It improves by  Medications, rest   It worsens by  Movement   She been compliant with the recommendations while in the hospital.      PAST PAIN TREATMENT:   Medications: ibuprofen   Non-phamacologic modalities: camwalker, walker, rest   Previous interventions/surgeries: hip surgery     [place] [facility] database review:  Yes no controlled substances in the last yeae           D.I.R.E Score: Patient Selection for Chronic Opioid  Analgesia    For each factor, rate the patient's score from 1 - 3 based on the explanations on the right.       Diagnosis             2         1 = Benign chronic condition with minimal objective findings or no definite medical diagnosis.  Examples:  fibromyalgia, migraine, headaches, non-specific back pain.  2 = Slowly progressive condition concordant with moderate pain, or fixed condition with moderate objective findings.  Examples: failed back surgery syndrome, back pain with moderate degenerative changes, neuropathic pain.  3 = Advanced condition concordant with severe pain with objective findings.  Examples: severe ischemic vascular disease, advanced neuropathy, severe spinal stenosis.    Intractability             2         1 = Few therapies have been tried and the patient takes a passive role in his/her pain management process.   2 = Most costomary treatments have been tried but the patient is not fully engaged in the pain management process, or barriers prevent (insurance, transportation, medical illness)  3 = Patient fully engaged in a spectrum of appropriate treatments but with inadequate response.    Risk   (Risk = Total of P+C+R+S below)       Psychological             2         1 = Serious personality dysfunction or mental illness interfering with care.  Examples: personality disorder, severe affective disorder, significant personality issues.  2 = Personality or mental health interferes moderately.  Example: depression or anxiety disorder.  3 = Good communication with the clinic.  No significant personality dysfunction or mental illness.       Chemical      Health             2         1 = Active or very recent use of illicit drugs, excessive alcohol, or prescription drug abuse.  2 = Chemical coper (uses medications to cope with stress) or history of chemical dependency in remission.  3 = No CD history.  Not drug-focused or chemically reliant       Reliability             2         1 = History of  numerous problems: medication misuse, missed appointments, rarely follows through.  2 = Occasional difficulties with compliance, but generally reliable.  3 = Highly reliable patient with medications, appointments and treatment.       Social      Support             3         1= Life in chaos.  Little family support and few close relationships.  Loss of most normal life roles.  2 = Reduction in some relationships and life roles.  3 = Supportive family/close relationships.  Involved in work or school and no social isolation.    Efficacy score             2         1 = Poor function or minimal pain relief despite moderate to high doses.  2 = Moderate benefit with function improved in a number of ways (or insufficient info - hasn't tried opioid yet or very low doses or too short a trial.  3 = Good improvement in pain and function and quality of life with stable doses over time.                                    15    Total score = D + I + R + E    Score 7-13: Not a suitable candidate for long-term opioid analgesia  Score 14-21: May be a good candidate for long-term opioid analgesia    Copyright 2013 Nilton Bridges MD, The DIRE Score: Predicting Outcomes of Opioid Prescribing for Chronic Pain. The Journal of Pain. 7(9) (September), 2006:671-681    Past Medical History   I have reviewed this patient's medical history and updated it with pertinent information if needed.   Past Medical History:   Diagnosis Date     Arthritis      Foot drop, bilateral      IBS (irritable bowel syndrome)      Peripheral neuropathy        Past Surgical History   I have reviewed this patient's surgical history and updated it with pertinent information if needed.  Past Surgical History:   Procedure Laterality Date      knee arthroscopy  Left     meniscus repair     ARTHRODESIS ANKLE  4/16/2012    Procedure:ARTHRODESIS ANKLE; right ankle medial closing wedge ostetomy intermedually nailing removal of hardware; Surgeon:HUMA GARRETT;  Location: [facility]      SECTION       GYN SURGERY      oopherectomy     ORTHOPEDIC SURGERY Left 2009    left hip      right ankle fusion  Right          Prior to Admission Medications   Prior to Admission Medications   Prescriptions Last Dose Informant Patient Reported? Taking?   Cholecalciferol (VITAMIN D3 PO) 2019 at Unknown time  Yes Yes   Sig: Take 5,000 Units by mouth daily    buPROPion (WELLBUTRIN XL) 150 MG 24 hr tablet 2019 at Unknown time  Yes Yes   Si mg daily    ibuprofen (ADVIL/MOTRIN) 200 MG tablet 1/10/2019  Yes Yes   Sig: Take 200-400 mg by mouth every 6 hours as needed for mild pain   order for DME   No No   Sig: Equipment being ordered: [employer]   Patient not taking: Reported on 2018      Facility-Administered Medications: None     Allergies   Allergies   Allergen Reactions     Sporanox [Itraconazole] Anaphylaxis     Amoxicillin Itching     Ceftin Hives     Clindamycin Hcl Itching     Codeine Sulfate Nausea     Morphine Hcl Hives     Keflex [Cephalexin Hcl] Rash       Social History   I have personally reviewed the social history with the patient showing   Social History     Socioeconomic History     Marital status:      Spouse name: Not on file     Number of children: 1     Years of education: Not on file     Highest education level: Not on file   Social Needs     Financial resource strain: Not on file     Food insecurity - worry: Not on file     Food insecurity - inability: Not on file     Transportation needs - medical: Not on file     Transportation needs - non-medical: Not on file   Occupational History     Not on file   Tobacco Use     Smoking status: Current Every Day Smoker     Packs/day: 1.00     Types: Cigarettes     Smokeless tobacco: Never Used     Tobacco comment: 1-pack daily    Substance and Sexual Activity     Alcohol use: Yes     Comment: 1 - 4 times per week     Drug use: No     Sexual activity: Not on file   Other Topics Concern     Parent/sibling  w/ CABG, MI or angioplasty before 65F 55M? Not Asked   Social History Narrative     Not on file   .  Family History   I have reviewed this patient's family history and updated it with pertinent information if needed.   Family History   Problem Relation Age of Onset     Multiple Sclerosis Mother      Hypertension Father      Family history of addiction unknown    Review of Systems   The 10 point Review of Systems is negative other than noted in the HPI or here.    Denies Bowel or bladder dysfunction    Physical Exam   Temp:  [96.2  F (35.7  C)-98.3  F (36.8  C)] 96.2  F (35.7  C)  Pulse:  [64-88] 68  Heart Rate:  [64-88] 78  Resp:  [8-47] 17  BP: (106-151)/(64-97) 130/74  SpO2:  [96 %-100 %] 98 %  206 lbs 0 oz  GEN:  Alert, oriented x 3, appears comfortable, No apparent distress.  HEENT:  Normocephalic/atraumatic, no scleral icterus, no nasal discharge, mouth moist.  CV:  RRR, S1, S2; no murmurs or other irregularities noted.  +3 DP/PT pulses bilaterally; no edema bilateral lower extremeties.  RESP:  Clear to auscultation bilaterally without rales/rhonchi/wheezing/retractions.  Symmetric chest rise on inhalation noted.  Normal respiratory effort.  ABD:  Rounded, soft, non-tender/non-distended.  +BS, diminished   EXT:  Edema & pulses as noted above.  Color, moisture and sensation intact x 4.     M/S:   nonTender to palpation lightly over left hip.    SKIN:  Dry to touch, no exanthems noted in the visualized areas.    NEURO: Symmetric strength.  Sensation to touch intact all extremities.   There is no area of allodynia or hyperesthesia.  PAIN BEHAVIOR: Cooperative  Psych:  Normal affect.  Calm, cooperative, conversant appropriately.       Data   Results for orders placed or performed during the hospital encounter of 01/17/19 (from the past 24 hour(s))   Hemoglobin   Result Value Ref Range    Hemoglobin 15.5 11.7 - 15.7 g/dL   ABO/Rh type and screen   Result Value Ref Range    ABO A     RH(D) Pos     Antibody Screen Neg      Test Valid Only At [facility]        Specimen Expires 01/20/2019    Anaerobic bacterial culture   Result Value Ref Range    Specimen Description Left Hip Wound     Special Requests Received in anaerobic tubes.     Culture Micro PENDING    Gram stain   Result Value Ref Range    Specimen Description Left Hip Wound     Special Requests Specimen collected in eSwab transport (white cap)     Gram Stain (A)      Few  Gram negative rods  Previously reported as:  No organisms seen  CORRECTED ON:   1/17/19 @1311. CT  Called to   BALDEV PORTILLO RN R[facility]ACU      Gram Stain Rare  WBC'S seen  predominantly PMN's      Wound Culture Aerobic Bacterial   Result Value Ref Range    Specimen Description Left Hip Wound     Special Requests Specimen collected in eSwab transport (white cap)     Culture Micro PENDING

## 2019-01-17 NOTE — PROGRESS NOTES
01/17/19 1718   Quick Adds   Type of Visit Initial PT Evaluation   Living Environment   Lives With significant other   Living Arrangements house   Home Accessibility stairs to enter home;stairs within home   Living Environment Comment Pt with mulitple stairs to enter home with no railings present, bedroom up full flight of stairs   Self-Care   Usual Activity Tolerance good   Current Activity Tolerance fair   Equipment Currently Used at Home none   Activity/Exercise/Self-Care Comment has a walker and quad cane   Functional Level Prior   Ambulation 0-->independent   Transferring 0-->independent   Toileting 0-->independent   Bathing 0-->independent   Fall history within last six months yes   Number of times patient has fallen within last six months 1   Which of the above functional risks had a recent onset or change? ambulation;transferring;toileting;bathing;dressing   General Information   Onset of Illness/Injury or Date of Surgery - Date 01/17/19   Referring Physician More Stephens MD   Patient/Family Goals Statement Discharge home and return to work in ~3 weeks   Pertinent History of Current Problem (include personal factors and/or comorbidities that impact the POC) Pt is POD0 L ODILON revision   Precautions/Limitations fall precautions;left hip precautions  (Anterior hip precautions with hip abductor brace in place)   Weight-Bearing Status - LLE toe touch weight-bearing   Weight-Bearing Status - RLE full weight-bearing   General Observations Pt supine upon initiation   Cognitive Status Examination   Orientation orientation to person, place and time   Level of Consciousness alert   Follows Commands and Answers Questions 100% of the time   Personal Safety and Judgment intact   Memory intact   Pain Assessment   Patient Currently in Pain Yes, see Vital Sign flowsheet   Integumentary/Edema   Integumentary/Edema Comments Dressing intact to L hip   Posture    Posture Forward head position;Protracted shoulders   Range  "of Motion (ROM)   ROM Comment L hip ROM limited 2/2 precautions   Strength   Strength Comments good quad set, B foot drop   Bed Mobility   Bed Mobility Comments Not assessed   Transfer Skills   Transfer Comments Not assessed   Gait   Gait Comments Not assessed   Balance   Balance Comments Pt will require use of walker for ambulation   Sensory Examination   Sensory Perception Comments Pt with decreased sensation to B feet at baseline   Coordination   Coordination Comments unable to DF or PF B feet   Muscle Tone   Muscle Tone Comments relatively flacid ankles, no active PF/DF   Modality Interventions   Planned Modality Interventions Cryotherapy   Planned Modality Interventions Comments Ice PRN   General Therapy Interventions   Planned Therapy Interventions bed mobility training;gait training;strengthening;stretching;transfer training;home program guidelines;progressive activity/exercise;ROM   Clinical Impression   Criteria for Skilled Therapeutic Intervention yes, treatment indicated   PT Diagnosis Impaired functional mobility   Influenced by the following impairments Pain, weakness, hip abd brace/precautions, TTWB   Functional limitations due to impairments Difficulty with bed mobility, transfers, ambulation, stairs   Clinical Presentation Stable/Uncomplicated   Clinical Presentation Rationale medically stable   Clinical Decision Making (Complexity) Low complexity   Therapy Frequency` 2 times/day   Predicted Duration of Therapy Intervention (days/wks) 3 days   Anticipated Discharge Disposition Home with Assist   Risk & Benefits of therapy have been explained Yes   Patient, Family & other staff in agreement with plan of care Yes   [facility] GOODWIN-PAC TM \"6 Clicks\"   2016, Trustees of [facility], under license to [employer].  All rights reserved.   6 Clicks Short Forms Basic Mobility Inpatient Short Form   [facility] AM-PAC  \"6 Clicks\" V.2 Basic Mobility Inpatient Short Form   1. Turning from your " back to your side while in a flat bed without using bedrails? 3 - A Little   2. Moving from lying on your back to sitting on the side of a flat bed without using bedrails? 3 - A Little   3. Moving to and from a bed to a chair (including a wheelchair)? 2 - A Lot   4. Standing up from a chair using your arms (e.g., wheelchair, or bedside chair)? 2 - A Lot   5. To walk in hospital room? 2 - A Lot   6. Climbing 3-5 steps with a railing? 2 - A Lot   Basic Mobility Raw Score (Score out of 24.Lower scores equate to lower levels of function) 14   Total Evaluation Time   Total Evaluation Time (Minutes) 8

## 2019-01-17 NOTE — PROGRESS NOTES
Received order for hip abduction brace.  My co-worker Cecilio FRAGA called [employer] and left message regarding flexion/extension.  The call was returned and was told 0-70 degrees.  I went to see patient, patient has hip abduction she received the end of last month and has been wearing.  I verified set at 0-70.  She reports that she has drop foot on left and has had for years and has had afo's but didn't like and found figure 8 straps work for her and has been using for 10 years but they cause pressure on her dorsum.  Patient asked me about afo and she said somebody told her about an [place] Walk on that is smaller than a plastic afo.  She would like to try.  I checked her shoe size 10 and tried her shoes on but the left is tight on her foot and will not allow for an afo.  I discussed obtaining footwear with patient, P.T. Pain management was in the room as well.  I talked to Nurse Drummond about obtaining an order from the physician to eval and treat for an afo.  Henri MARTINEZ.

## 2019-01-17 NOTE — PLAN OF CARE
PT: Orders received. PT evaluation completed and treatment initiated. Pt reports living in a house with 3-4 platform steps and 3 traditional steps to enter-no handrails present. Pt has a full flight of stairs up to bedroom, but plans on staying on main level to begin with. Pt reports multiple family members will be present to assist the first 48 hours after discharge, and after that time the pt reports that there will likely be someone present 24/7. Pt has a walker and quad cane that she was not using prior to surgery. Pt also with history of B foot drop and has been wearing figure 8 straps to assist with this.     Discharge Planner PT   Patient plan for discharge: Home with family assist  Current status: Orthotics present at initiation of session discussing need for AFO on L LE due to restrictions caused by hip abduction brace (pt with limited hip flexion, therefore more difficulty clearing L foot 2/2 foot drop). Pt with multiple questions regarding wear of hip abduction brace, no specific orders in at time of eval. This writer spoke to CASEY Infante on the phone regarding hip abduction brace and his report was that the pt needs to wear brace at all times-order placed in chart. Pt encouraged to ask surgeon and/or PA questions regarding driving/bathing in relation to brace. Pt nauseated throughout discussion and fatigued. Pt admits that she is fearful of mobility at this time, and reassurance provided that staff will keep pt safe. No mobility completed at this time 2/2 time spent educating pt on precautions, TTWB status, brace wearing, post-op expectations and communicating with interdisciplinary team regarding AFO and orders.   Barriers to return to prior living situation: TTWB, hip abduction brace, B foot drop, stairs to enter home  Recommendations for discharge: Will provide discharge recommendations after mobility can formally be assessed  Rationale for recommendations: Because session was spent on needed  education an clarifying orders no formal mobility evaluation completed. Will update recommendations once pt attempts mobility with PT.        Entered by: Tashia Jason 01/17/2019 5:11 PM

## 2019-01-17 NOTE — ANESTHESIA CARE TRANSFER NOTE
Patient: Ai Boyer    Procedure(s):  Revision left total hip arthroplasty    Diagnosis: Total Hip Arthroplasty revision  Diagnosis Additional Information: No value filed.    Anesthesia Type:   General, ETT     Note:  Airway :Face Mask  Patient transferred to:PACU  Handoff Report: Identifed the Patient, Identified the Reponsible Provider, Reviewed the pertinent medical history, Discussed the surgical course, Reviewed Intra-OP anesthesia mangement and issues during anesthesia, Set expectations for post-procedure period and Allowed opportunity for questions and acknowledgement of understanding      Vitals: (Last set prior to Anesthesia Care Transfer)    CRNA VITALS  1/17/2019 1110 - 1/17/2019 1140      1/17/2019             Resp Rate (observed):  1  (Abnormal)                 Electronically Signed By: ROXANNA Alex CRNA  January 17, 2019  11:40 AM

## 2019-01-17 NOTE — ANESTHESIA PREPROCEDURE EVALUATION
Anesthesia Pre-Procedure Evaluation    Patient: Ai Boyer   MRN: [id number] : 1960          Preoperative Diagnosis: Total Hip Arthroplasty revision    Procedure(s):  Revision left total hip arthroplasty    Past Medical History:   Diagnosis Date     Arthritis      Foot drop, bilateral      IBS (irritable bowel syndrome)      Peripheral neuropathy      Past Surgical History:   Procedure Laterality Date     ARTHRODESIS ANKLE  2012    Procedure:ARTHRODESIS ANKLE; right ankle medial closing wedge ostetomy intermedually nailing removal of hardware; Surgeon:HUMA GARRETT; Location:[facility]     GYN SURGERY      oopherectomy     ORTHOPEDIC SURGERY      multiple orthopedic     Anesthesia Evaluation     . Pt has had prior anesthetic. Type: General    History of anesthetic complications   - PONV        ROS/MED HX    ENT/Pulmonary:      (-) tobacco use, asthma, sleep apnea and Other pulmonary disease   Neurologic:      (-) TIA, Other neuro hx and Dementia   Cardiovascular:        (-) hypertension, CAD, CHF, irregular heartbeat/palpitations, pulmonary hypertension and dyslipidemia   METS/Exercise Tolerance:     Hematologic:        (-) anemia   Musculoskeletal:   (+) arthritis, , , -       GI/Hepatic:        (-) GERD and other GI/Hepatic   Renal/Genitourinary:      (-) renal disease   Endo:      (-) Type I DM, Type II DM and other endocrine disorder   Psychiatric:        (-) psychiatric history   Infectious Disease:  - neg infectious disease ROS       Malignancy:         Other:    - neg other ROS                      Physical Exam      Airway   Mallampati: II  TM distance: >3 FB  Neck ROM: full    Dental     Cardiovascular   Rhythm and rate: regular and normal  (-) no murmur    Pulmonary    breath sounds clear to auscultation    Other findings: T and S drawn    Lab Test        19                       0613          1104          2050          WBC           --          6.4        "    --           HGB          15.5         15.6          --           MCV           --          92            --           PLT           --          224          228            No lab results found.        Lab Results   Component Value Date    WBC 6.4 12/04/2018    HGB 15.5 01/17/2019    HCT 47.0 12/04/2018     12/04/2018    CRP <2.9 11/13/2018    SED 3 11/13/2018     05/22/2009    POTASSIUM 4.0 05/22/2009    CHLORIDE 106 05/22/2009    CO2 27 05/22/2009    BUN 16 05/21/2009    CR 0.71 05/21/2009     (H) 05/21/2009    ANNI 9.6 05/21/2009    PTT 25 05/21/2009    INR 2.00 (H) 05/24/2009       Preop Vitals  BP Readings from Last 3 Encounters:   01/17/19 (!) 138/97   01/03/19 134/88   12/04/18 155/80    Pulse Readings from Last 3 Encounters:   01/17/19 88   01/03/19 91   12/04/18 86      Resp Readings from Last 3 Encounters:   01/03/19 20   12/04/18 18   12/05/17 16    SpO2 Readings from Last 3 Encounters:   01/17/19 96%   01/03/19 96%   12/04/18 98%      Temp Readings from Last 1 Encounters:   01/17/19 97.7  F (36.5  C)    Ht Readings from Last 1 Encounters:   01/17/19 1.727 m (5' 8\")      Wt Readings from Last 1 Encounters:   01/17/19 93.4 kg (206 lb)    Estimated body mass index is 31.32 kg/m  as calculated from the following:    Height as of this encounter: 1.727 m (5' 8\").    Weight as of this encounter: 93.4 kg (206 lb).       Anesthesia Plan      History & Physical Review  History and physical reviewed and following examination; no interval change.    ASA Status:  2 .    NPO Status:  > 8 hours    Plan for General and ETT with Propofol induction. Maintenance will be Balanced.    PONV prophylaxis:  Ondansetron (or other 5HT-3) and Dexamethasone or Solumedrol  Propofol gtt + Sevo + 60% O2 please      Postoperative Care  Postoperative pain management:  IV analgesics and Oral pain medications.      Consents  Anesthetic plan, risks, benefits and alternatives discussed with:  Patient.  Use of blood " products discussed: Yes.   Use of blood products discussed with Patient.  Consented to blood products.  .                 Mariusz Draper MD                    .

## 2019-01-18 ENCOUNTER — APPOINTMENT (OUTPATIENT)
Dept: PHYSICAL THERAPY | Facility: CLINIC | Age: 59
DRG: 468 | End: 2019-01-18
Attending: ORTHOPAEDIC SURGERY
Payer: COMMERCIAL

## 2019-01-18 LAB
COPATH REPORT: NORMAL
GLUCOSE SERPL-MCNC: 108 MG/DL (ref 70–99)
HGB BLD-MCNC: 11.5 G/DL (ref 11.7–15.7)

## 2019-01-18 PROCEDURE — 25000132 ZZH RX MED GY IP 250 OP 250 PS 637: Performed by: ORTHOPAEDIC SURGERY

## 2019-01-18 PROCEDURE — 36415 COLL VENOUS BLD VENIPUNCTURE: CPT | Performed by: ORTHOPAEDIC SURGERY

## 2019-01-18 PROCEDURE — 12000000 ZZH R&B MED SURG/OB

## 2019-01-18 PROCEDURE — 85018 HEMOGLOBIN: CPT | Performed by: ORTHOPAEDIC SURGERY

## 2019-01-18 PROCEDURE — 99233 SBSQ HOSP IP/OBS HIGH 50: CPT | Performed by: NURSE PRACTITIONER

## 2019-01-18 PROCEDURE — 25000132 ZZH RX MED GY IP 250 OP 250 PS 637: Performed by: NURSE PRACTITIONER

## 2019-01-18 PROCEDURE — 40000193 ZZH STATISTIC PT WARD VISIT: Performed by: PHYSICAL THERAPIST

## 2019-01-18 PROCEDURE — 97116 GAIT TRAINING THERAPY: CPT | Mod: GP | Performed by: PHYSICAL THERAPIST

## 2019-01-18 PROCEDURE — 82947 ASSAY GLUCOSE BLOOD QUANT: CPT | Performed by: ORTHOPAEDIC SURGERY

## 2019-01-18 PROCEDURE — 97530 THERAPEUTIC ACTIVITIES: CPT | Mod: GP | Performed by: PHYSICAL THERAPIST

## 2019-01-18 PROCEDURE — 97110 THERAPEUTIC EXERCISES: CPT | Mod: GP | Performed by: PHYSICAL THERAPIST

## 2019-01-18 RX ORDER — SALIVA STIMULANT COMB. NO.3
1 SPRAY, NON-AEROSOL (ML) MUCOUS MEMBRANE EVERY 6 HOURS PRN
Status: DISCONTINUED | OUTPATIENT
Start: 2019-01-18 | End: 2019-01-20 | Stop reason: HOSPADM

## 2019-01-18 RX ORDER — HYDROMORPHONE HYDROCHLORIDE 2 MG/1
2-4 TABLET ORAL EVERY 4 HOURS PRN
Qty: 30 TABLET | Refills: 0 | Status: SHIPPED | OUTPATIENT
Start: 2019-01-18 | End: 2019-01-21

## 2019-01-18 RX ORDER — LIDOCAINE 4 G/G
1 PATCH TOPICAL
Status: DISCONTINUED | OUTPATIENT
Start: 2019-01-18 | End: 2019-01-20 | Stop reason: HOSPADM

## 2019-01-18 RX ADMIN — Medication 1 SPRAY: at 14:42

## 2019-01-18 RX ADMIN — BUPROPION HYDROCHLORIDE 150 MG: 150 TABLET, FILM COATED, EXTENDED RELEASE ORAL at 07:51

## 2019-01-18 RX ADMIN — DICLOFENAC 4 G: 10 GEL TOPICAL at 21:34

## 2019-01-18 RX ADMIN — HYDROMORPHONE HYDROCHLORIDE 2 MG: 2 TABLET ORAL at 04:10

## 2019-01-18 RX ADMIN — ACETAMINOPHEN 975 MG: 325 TABLET, FILM COATED ORAL at 06:06

## 2019-01-18 RX ADMIN — TIZANIDINE 4 MG: 2 TABLET ORAL at 20:09

## 2019-01-18 RX ADMIN — ACETAMINOPHEN 975 MG: 325 TABLET, FILM COATED ORAL at 21:33

## 2019-01-18 RX ADMIN — HYDROMORPHONE HYDROCHLORIDE 4 MG: 2 TABLET ORAL at 21:05

## 2019-01-18 RX ADMIN — HYDROMORPHONE HYDROCHLORIDE 4 MG: 2 TABLET ORAL at 00:15

## 2019-01-18 RX ADMIN — SENNOSIDES AND DOCUSATE SODIUM 2 TABLET: 8.6; 5 TABLET ORAL at 20:08

## 2019-01-18 RX ADMIN — CHOLECALCIFEROL CAP 125 MCG (5000 UNIT) 5000 UNITS: 125 CAP at 07:51

## 2019-01-18 RX ADMIN — HYDROMORPHONE HYDROCHLORIDE 2 MG: 2 TABLET ORAL at 14:41

## 2019-01-18 RX ADMIN — HYDROXYZINE HYDROCHLORIDE 25 MG: 25 TABLET ORAL at 11:28

## 2019-01-18 RX ADMIN — ACETAMINOPHEN 975 MG: 325 TABLET, FILM COATED ORAL at 14:41

## 2019-01-18 RX ADMIN — DICLOFENAC 4 G: 10 GEL TOPICAL at 14:42

## 2019-01-18 RX ADMIN — DICLOFENAC 4 G: 10 GEL TOPICAL at 20:09

## 2019-01-18 RX ADMIN — ASPIRIN 325 MG: 325 TABLET, DELAYED RELEASE ORAL at 07:51

## 2019-01-18 RX ADMIN — SENNOSIDES AND DOCUSATE SODIUM 1 TABLET: 8.6; 5 TABLET ORAL at 07:51

## 2019-01-18 RX ADMIN — ASPIRIN 325 MG: 325 TABLET, DELAYED RELEASE ORAL at 20:08

## 2019-01-18 RX ADMIN — HYDROXYZINE HYDROCHLORIDE 25 MG: 25 TABLET ORAL at 04:10

## 2019-01-18 RX ADMIN — HYDROMORPHONE HYDROCHLORIDE 2 MG: 2 TABLET ORAL at 11:26

## 2019-01-18 RX ADMIN — LIDOCAINE 1 PATCH: 560 PATCH PERCUTANEOUS; TOPICAL; TRANSDERMAL at 21:34

## 2019-01-18 RX ADMIN — HYDROMORPHONE HYDROCHLORIDE 2 MG: 2 TABLET ORAL at 17:25

## 2019-01-18 RX ADMIN — HYDROMORPHONE HYDROCHLORIDE 2 MG: 2 TABLET ORAL at 07:51

## 2019-01-18 ASSESSMENT — ACTIVITIES OF DAILY LIVING (ADL)
ADLS_ACUITY_SCORE: 14
ADLS_ACUITY_SCORE: 14
ADLS_ACUITY_SCORE: 13
ADLS_ACUITY_SCORE: 13
ADLS_ACUITY_SCORE: 14
ADLS_ACUITY_SCORE: 13

## 2019-01-18 NOTE — PLAN OF CARE
Pt A&O x4. VS stable; low-grade temp-encouraged use of IS. PO dilaudid, vistaril, scheduled tylenol and voltaren gel managing pain. CMS: baseline neuropathy in BLE's. Dressing CDI-incision iced. Brace remains on at all times-fitted for L foot brace today. Up w/ A1, using gait belt, and walker. TTWB. Carmona pulled this AM-voiding. Tolerating regular diet. Plan is home @ discharge. Will continue to monitor.

## 2019-01-18 NOTE — CONSULTS
ID consult dictated IMP 1 57 yo female mechanical L ODILON failure, no signs infection surprise + gram stain (late revision after op), seems highly likely false +    REC await cx, hold on ABX

## 2019-01-18 NOTE — PLAN OF CARE
Discharge Planner PT     Patient plan for discharge: Home with assist from significant other and children  Current status: Pt transfers sup <> sit with Min A at L LE.  Sit <> stand to FWW with CGA.  Pt issued new AFO from orthotics and purchased new shoes to fit AFO.  Pt amb with FWW and L AFO for ~ 50' with L TTWB and WC in tow.  Pt also amb up/down 4 steps with L rail and R crutch with Min A and VCs for sequencing and to maintain TTWB.  Pt had difficulty maintaining TTWB at top of stairs when turning 180 deg to descend.  Pt has fused R ankle but still able to descend with L LE lead with R foot on edge of step.   Barriers to return to prior living situation: stairs  Recommendations for discharge: Home with Home PT and assist from significant other or children for gait on stairs and to don AFO due to hip precautions.    Rationale for recommendations: Continued skilled PT in home environment as traveling to OP PT difficult for patient to enter/exit home with TTWB precautions.  Pt also has limited gait distance and activity tolerance.  Continued Home PT to increase L LE strength and progress independence with all functional mobility, including gait on stairs, to enable pt to increase safety and independence with mobility in her home environment.       Entered by: Ayde Godwin 01/18/2019 3:23 PM

## 2019-01-18 NOTE — PROGRESS NOTES
S: Patient with daughters were seen today at the [facility] in room 603 with an order from Dr. Caden Augustin MD for a LT AFO. Patient is in the hospital due to a revision to a LT ODILON. Patient states that the drop foot started 20 years ago and has been wearing elastic wraps for supports. Patient states that she has worn an AFO in the past but discontinued wearing it due to comfort issues. Patient states that she has limited sensation in the feet.     O: Patient was sitting in a wheelchair for the appointment. Patient is wearing a LT hip abduction brace. Manual muscle test was performed on the LT lower extremity: dorsiflexion, plantarflexion, inversion, eversion was 1+. Patient has a valgus/varus instability issue at the LT ankle along with an anterior/posterior instability issue.     A: Patient was fit with a LT Dwayne Blue Rocker AFO size MD (S/N [id number]). A 3mm heel lift was placed in patient's LT shoe to compensate for the heel height difference between the AFO and shoe. Patient was able to ambulate with the AFO without issue. AFO fits well on the patient.   AFO is comfortable for the patient to wear, alignment is appropriate, there are no significant signs of pressure, and device has been checked for defects per 's guidelines. Patient is satisfied with the fit and function of the AFO. Patient is an experienced wearer.  Patient was given verbal/written instructions on donning/doffing, care instructions, warranty issues, fitting issues, and who to contact if there is an issue.    G: AFO will treat patient's current condition by restricting plantarflexion during swing phase to create toe clearance and provide medial/lateral stability at the LT ankle during stance phase to create joint stabilization at the LT ankle, aid in ambulation, and increase patient's ADLs.    P: Patient will wear the AFO while weight bearing and ambulating. Patient will follow up with the [place] [facility] as  needed.     This note was electronically signed by Cecilio PAYNE , ABC #[id number], License #[id number]

## 2019-01-18 NOTE — CONSULTS
Consult Date:  01/18/2019      INFECTIOUS DISEASE CONSULTATION      LOCATION:  Room 603, [facility]      REFERRING PROVIDER:  More Stephens MD       IMPRESSION:   1.  Healthy 58-year-old female with mechanical left total hip arthroplasty failure, no signs of infection preoperatively or at surgery, but now surprise positive Gram's stain at surgery for gram-negative osman.   2.  No preceding signs of infection either in the hip or any other site.  No recent urine infections or other gram-negative type infections.      RECOMMENDATIONS:   1.  Most likely this is a false-positive Gram's stain as there were a few white cells seen, no signs of infection otherwise, cultures are pending.  For now, holding off on antibiotics given her ALLERGIES and unlikelihood this is infection.   2.  If culture is positive, presumably treat as infected joint with extended treatment.  If culture is negative, probably no antibiotics or possibly some oral therapy, although the likelihood low of any infection if cultures are negative.      HISTORY:  This 58-year-old female is seen in consultation due to possible infected left total hip arthroplasty.  The patient has a history of a prior total hip arthroplasty 10 years ago but had some progressive problems over the last couple of years.  There has been loosening, acetabular screw break and some chromium in the area.  There are no significant signs of infection.  Inflammatory markers were normal.  No aspiration was done.  She underwent surgical intervention for revision.  No signs of infection at surgery found.  Initial Gram's stain report was negative, but has been amended to show probable gram-negative rods, although there were minimal white cells present.  Cultures are now pending.      PAST MEDICAL HISTORY:  No major infection problems of any significance.      SOCIAL HISTORY:  No recent travel or exposures.  No one else has been ill.      FAMILY HISTORY:  Otherwise  unremarkable.  No major issues.      REVIEW OF SYSTEMS:  No fevers, chills, sweats or signs of infection.      PHYSICAL EXAMINATION:   GENERAL:  The patient appears her stated age, does not look toxic or ill.   VITAL SIGNS:  Currently normal, including being afebrile.   HEENT:  No thrush or intraoral lesions.  Pupils reactive.   NECK:  Supple and nontender.   HEART AND LUNGS:  Unremarkable.   ABDOMEN:  Soft, nontender.   EXTREMITIES:  The hip area is wrapped.      LABORATORY DATA:  Inflammatory markers normal.  Cultures so far negative and Gram's stain with very few white cells and yet positive Gram's stain for gram-negative rods, rarely seen, question false-positive.      Thank you much for the consultation.  I will follow the patient with you.         YISEL MONTALVO MD             D: 2019   T: 2019   MT: CAPRICE      Name:     LISBET KRISHNAMURTHY   MRN:      [id number]        Account:       [id number]   :      1960           Consult Date:  2019      Document: [id number]       cc: More Stephens MD

## 2019-01-18 NOTE — PLAN OF CARE
Vss. Afebrile. Lungs clr-O2 2L high 90s to 100%. Is done to 2500. Hypoactive bs/+gas, no nausea. Tolerating diet. Last bm 01/16/19. Kristine patent. Ivf infusing. Drsg-cdi. Cms-baseline numbness to bles with intermittent tingling. Utp flexion bilaterally d/t left foot drop & rt ankle fusion. Aspirin for anticoagulant therapy. Brace on. Pain managed with Oral Dilaudid/Atarax/Tylenol. Tolerating ice. Repositioning self. No other significant issues noted overnight.

## 2019-01-18 NOTE — PROGRESS NOTES
"/67   Pulse 68   Temp 98.1  F (36.7  C) (Oral)   Resp 14   Ht 1.727 m (5' 8\")   Wt 93.4 kg (206 lb)   SpO2 100%   BMI 31.32 kg/m    Lungs: clear, encouraged CDB and IS  BS: positive, passing flatus, tolerating regular diet  Urine: voids adequate amount of urine  CMS: intact denies any numbness or tingling   Dressing: new dressing CDI  Pain: controlled with oral pain meds, ice to operative exrtremity  Activity: up with Ax1 walker, and brace tolerates sitting up in chair for meals.  Plan: Will continue to monitor.   "

## 2019-01-18 NOTE — PROGRESS NOTES
[facility]  Pain Management Progress Note  Text Page     Assessment & Plan   Ai Boyer is a 58 year old female who was admitted on 1/17/2019. I was asked by More Stephens MD  to see the patient for pain management strategies. She is post left hip total arthroplasty POD 1     1)  Acute on chronic left hip pain  Acute postoperative pain in the setting of chronic hip pain increasing over the last 2 years      2)  Patient with chronic  pain, not on chronic opioid therapy   left hip pain  idiopathic peripheral neuropathy   Baseline 0 mg Daily Morphine Equivalent as dispensed and as reported daily use.  Patient has no expected opioid tolerance.      Patient's opioid use in past 24 hours: dilaudid IV 0 mg, oral 1- mg  =  40 mg Daily Morphine Equivalent     3)  Risk factors for opioid related harms  -Had a tolerance at at one time, no longer expected to have that tolerance  -High opioid dose (>50 MME/day)  -remote history of THC in drug screen     4)  Opioid induced side-effects:  -Constipation  No last BM 1/16  -Nausea/Vomit nausea with dilaudid   -Sedation no   -Urinary Retention  no      5)  Other/Related:    -Depression/anxiety anxiety 2013 on xanax prn in past  -Deconditioning likely   - obesity   -hx of illicit substance (THC in UDS) use in 2015 while under pain contract using Norco 10/325. Not a problem, counseled on use with acute use of opioids  -tobacco dependence -Wellbutrin offered Nicotine patch, not needed        PLAN:   1)   Explained pain plan to patient emphasizing multimodal analgesia and complimentary therapies   2)   No change in opioids at this time.   3)Non-opioid multimodal medication therapy  -Topical: add, Once dressing removed left hip then start Voltaren 1% Topical Gel, 4 grams tid and Lidocaine Patch 4% at hs  -N-SAIDS:Avoid due to post op state unless cleared by surgical team  -Muscle Relaxants:Tizanidine  2-4 mg tid prn   -Adjuvants:Acetaminophen 975 mg every 8 hrs for 3  days  -Antidepresants/anxiolytics:Bupropion as chronic  for smoking cessation  4)  Non-medication interventions  Positioning, ICE, Relaxation, Physical therapy, Brace  Left hip as directed by surgeon. Foot orthotics to discuss with surgeon   5)  Opioids:Hydromorphone 2-4 mg every 3 hrs prn, Dilaudid IV 0.3-0.5 mg every 3 hrs prn   Opioids Treatment Goal: -Improvement in function  -Participate in PT  -Post-operative management of pain, expected 3-7 days needed  6)  Constipation Prophylaxis    Continue to Monitor, Bowel Meds PRN per Constipation Order Set, Senna-S 1-2 bid prn   7)  Pain Education  -Opioid safe use, storage and disposal information included in DC AVS  8)  DC Planning   Discussed goal of Opioid therapy as above with   Length of therapy is less than 10 days, opioids may be stopped without taper.  Discussed length of opioids for acute postop pain with patient   Continued outpatient management of pain per surgical team if discharged with opioids  Discussed cross over to oxycodone, but not wanting this today.  Discussed use of lyrica and or Cymbalta for IPN and MSK pain from left ODILON.  This can be deferred as out patient therapy option.     Disposition: home   Support systems:  Daughter   Outpatient Referrals: to be determined  The following risk factors have been identified for unintentional overdose: patient is a smoker and patient is not expected to have previous tolerance given gap in opioid use. Consider discharge with intra-nasal naloxone if discharged to home with opioids>50 MME.  Plan for education prior to discharge    Nicole MCKNIGHT CNP  Pain Management and Palliative Care  [facility]  Pgr: [phone]    Time Spent on this Encounter   I spent   30 minutes (11:50-12:20) in assessment of the patient, counseling and discussion with the patient and family as documented in sections below. Another 10  minutes in review of chart, documentation, coordination of care  and discussion with the health care team.      Interval History    Up in chair, hip brace on   Engaged, pleasant, cooperative   Voiding, + flatus  Appetite good   Pain manageable 1-4/10  Participating in Physical Therapy    Daughters in room    Review of Systems    CONSTITUTIONAL: NEGATIVE for fever, chills, change in weight  ENT/MOUTH: NEGATIVE for ear, mouth and throat problems  RESP: NEGATIVE for significant cough or SOB  CV: NEGATIVE for chest pain, palpitations or peripheral edema    Physical Exam   Temp:  [96.2  F (35.7  C)-99.5  F (37.5  C)] 99.5  F (37.5  C)  Pulse:  [65-85] 85  Heart Rate:  [64-79] 72  Resp:  [8-26] 16  BP: (106-144)/(50-93) 110/50  SpO2:  [97 %-100 %] 99 %  206 lbs 0 oz  GEN:  Alert, oriented x 3, appears comfortable, NAD.  HEENT:  Normocephalic/atraumatic, no scleral icterus, no nasal discharge, mouth moist.  CV:  RRR, S1, S2; no murmurs or other irregularities noted.  +3 DP/PT pulses bilatererally; no edema BLE.  RESP:  Clear to auscultation bilaterally without rales/rhonchi/wheezing/retractions.  Symmetric chest rise on inhalation noted.  Normal respiratory effort.  ABD:  Rounded, soft, non-tender/non-distended.  +BS  EXT:  Edema & pulses as noted above.  CMS intact x 4.     M/S:   NonTender to palpation throughout .    SKIN:  Dry to touch, no exanthems noted in the visualized areas.    NEURO: Symmetric strength.  Sensation to touch intact all extremities.   There is no area of allodynia or hyperesthesia.  PAIN BEHAVIOR: Cooperative  Psych:  Normal affect.  Calm, cooperative, conversant appropriately.    Medications     lactated ringers Stopped (01/18/19 0907)       acetaminophen  975 mg Oral Q8H     aspirin  325 mg Oral BID     buPROPion  150 mg Oral Daily     cholecalciferol  5,000 Units Oral Daily     diclofenac  4 g Transdermal 4x Daily     lidocaine  1 patch Transdermal Q24h    And     [START ON 1/19/2019] lidocaine   Transdermal Q24H    And     lidocaine   Transdermal Q8H      senna-docusate  1 tablet Oral BID    Or     senna-docusate  2 tablet Oral BID     sodium chloride (PF)  3 mL Intracatheter Q8H       Data   Results for orders placed or performed during the hospital encounter of 01/17/19 (from the past 24 hour(s))   Hemoglobin   Result Value Ref Range    Hemoglobin 11.5 (L) 11.7 - 15.7 g/dL   Glucose   Result Value Ref Range    Glucose 108 (H) 70 - 99 mg/dL

## 2019-01-18 NOTE — PROGRESS NOTES
"Ai Boyer  2019  POD # 1 s/p Rev LTHA  Admit Date:  2019      Doing well.  Objective: pain well controlled. Working with therapy slowly. Plans on going home first of next week.   Blood pressure 110/50, pulse 85, temperature 99.5  F (37.5  C), temperature source Oral, resp. rate 16, height 1.727 m (5' 8\"), weight 93.4 kg (206 lb), SpO2 99 %, not currently breastfeeding.    Temperatures:  Current - Temp: 99.5  F (37.5  C); Max - Temp  Av.1  F (36.7  C)  Min: 97.3  F (36.3  C)  Max: 99.5  F (37.5  C)  Pulse range: Pulse  Av  Min: 85  Max: 85  Blood pressure range: Systolic (24hrs), Av , Min:110 , Max:139   ; Diastolic (24hrs), Av, Min:50, Max:69    Exam:  CMS: intact  alert, stable, wound ok  Dressing c/d/i  Calf s/nt  Patient with foot drop in the LLE for the past 20 years she states    Labs:  No results for input(s): POTASSIUM in the last 58025 hours.  Recent Labs   Lab Test 19  0715 19  0613 18  1104   HGB 11.5* 15.5 15.6     No results for input(s): INR in the last 46769 hours.  Recent Labs   Lab Test 18  1104 12  2050    228       PLAN: Aspirin for DVT ppx. Hip abduction brace at all times.   Walker at all times. TTWB in the LLE. Infectious disease team following gram stain.     "

## 2019-01-18 NOTE — PROGRESS NOTES
"/67   Pulse 68   Temp 98.1  F (36.7  C) (Oral)   Resp 14   Ht 1.727 m (5' 8\")   Wt 93.4 kg (206 lb)   SpO2 100%   BMI 31.32 kg/m    Lungs: clear, encouraged CDB and IS  BS: positive, passing flatus, tolerating regular diet  Urine: adequate amount of urine in dela cruz   CMS: baseline numbness bilateral foot, drop foot left, fused right foot  Dressing: dressing CDI, left wound covered declined wound nurse consult saying that she did follow up in a wound clinic before but she had to stop for finacial reasons. Pt states that she knows what works for her and what doesn't. Pressure wound caused by wraps and the use of a regular shoes. New order to keep brace on at all times, orthotics to see patient tomorrow.   Pain: controlled with oral pain meds, ice to operative exrtremity  Activity: up with Ax2 walker, and brace. Did get up once during shift took a few steps in her room.   Plan: Will continue to monitor.   "

## 2019-01-18 NOTE — OP NOTE
Procedure Date: 01/17/2019      PREOPERATIVE DIAGNOSIS:  Aseptic loosening left total arthroplasty.      POSTOPERATIVE DIAGNOSIS:  Aseptic loosening left total arthroplasty.      PROCEDURES PERFORMED:  Complex revision left total hip arthroplasty with impaction grafting of the acetabulum and removal of broken screws with revision of both acetabular and femoral components.      INDICATIONS FOR PROCEDURE:  The patient is a 58-year-old female who is 10 years out from a left total hip arthroplasty with a metal-on-metal articulation.  She had the same surgery done on her right side and did very well and was doing very well with the left hip until recently.  She came in for followup complaining of shortening of the leg.  X-rays showed obvious loosening of the acetabular component with breakage of the screw.  The patient was evaluated for infection, found to have no evidence of infection.  Chromium levels were elevated in her serum.  I discussed treatment option with her.  I explained the risks, benefits and complications of revision total hip arthroplasty and recommended that she proceed with revision.  The risks of surgery were discussed and the discussion included but not specific to infection, vascular neurologic complications, instability of the hip, leg length inequality, heterotopic ossification, fracture and the possible need for further revision surgery along with septic and aseptic loosening.  She voiced understanding of these risks and wanted to proceed.  I did receive a letter from her  asking that the implants be sent to them and I referred them to the [employer] system for dealing with that request.      ANESTHESIA:  General.      ASSISTANT:  MANDA ARMIJO PA-C      DESCRIPTION OF PROCEDURE:  The patient was taken to the operating room, placed on the operative table in supine position.  After adequate induction of a general anesthetic, she was transferred to lateral position and held this way with  regard to [facility] hip teresa.  She was held with the left hip up.  Care was taken to pad all bony prominences.  Axillary roll was placed.  The patient was given 3 grams Ancef for infection prophylaxis given 1 hour prior to incision.  We then performed sterile prep and drape of the left hip and left lower extremity.  After sterile prep and drape the old incision was opened.  I proceeded to divide the fascia, just as I had done previous anterolateral approach. I encountered a large cyst filled with a synovial fluid and was darkened, likely consistent with metal debris.  This was excised in its entirety.  We sent a specimen for Gram stain and culture.  Gram stain came back showing no organisms, few PMNs.  We then did an anterior capsulectomy.  The cup had switched position into a basically a face on extreme anteversion and verticality of the cup.  We did have some difficulty in trying to get the exposure.  We had to skeletonize the proximal femur for some distance in order to mobilize the femur.  we eventually were able to dislocate the hip.  We then gained exposure to the acetabulum and carefully dissected around the cup with osteotomes until the cup was loose and we were able to remove the cup.  Then there was a broken screw.  We had some difficulty getting the screw out, eventually able to get the screw out without any bone loss.  We got the cup out without any bone loss.  We then were able to expose the acetabulum and began reaming.  We reamed first to 54 mm, then eventually reamed to 55 mm to put in a 56 mm cup, we had good rim fit on the trial.  We then took a crush crouton allograft and using impaction grafting technique, impacted the acetabulum and then put the cup in.  Once the cup was fully seated, 3 screws were applied for additional fixation.  We ended up using a 56 mm Gription cup.  We then applied the manhole cover.  We then put in a +4 liner with a 10-degree lip for a 56 mm cup with 36 mm head.  Once  this was fully seated, we then did trial reductions.  Eventually corrected her leg length and stability with a +12 neck.  We then put on the actual head using a 36 mm ceramic head with a +12 neck, reduced the hip, found to be very stable in full extension, external rotation, flexion, adduction, internal rotation with restoration of leg length and offset.  We then soaked the hip in a dilute solution of Betadine for 3 minutes, and irrigated with pulse jet lavage.  Used approximately 3 liters antibiotic saline in pulsatile jet lavage.  We then repaired the abductors using #2 Ethibond.  We repaired the fascial layer using 0 Ethibond and then this was oversewn using 0 Stratafix.  The deep subq was closed in multiple layers using 0 Vicryl, the subcutaneous tissue was closed in multiple layers using 2-0 Vicryl.  The skin was closed with a running 3-0 Monocryl subcuticular stitch followed by Prineo dressing, then applied sterile dressing and abduction pillow.  The patient tolerated the operative procedure.  There were no intraoperative complications.  The patient to PAR in stable condition.  Plan will be for the patient to get 24 hours of Ancef for infection prophylaxis, 30 days of aspirin for DVT prophylaxis.  She will be touchdown weightbearing for 6 weeks in an abduction brace for 6 weeks.         DANIELITO CAMPOVERDE MD             D: 2019   T: 2019   MT: ALPA      Name:     LISBET KRISHNAMURTHY   MRN:      [id number]        Account:        [id number]   :      1960           Procedure Date: 2019      Document: [id number]

## 2019-01-19 ENCOUNTER — APPOINTMENT (OUTPATIENT)
Dept: OCCUPATIONAL THERAPY | Facility: CLINIC | Age: 59
DRG: 468 | End: 2019-01-19
Attending: ORTHOPAEDIC SURGERY
Payer: COMMERCIAL

## 2019-01-19 ENCOUNTER — APPOINTMENT (OUTPATIENT)
Dept: PHYSICAL THERAPY | Facility: CLINIC | Age: 59
DRG: 468 | End: 2019-01-19
Attending: ORTHOPAEDIC SURGERY
Payer: COMMERCIAL

## 2019-01-19 PROBLEM — Z91.89 AT RISK FOR HEALTHCARE ASSOCIATED INFECTION: Status: ACTIVE | Noted: 2019-01-19

## 2019-01-19 PROBLEM — R89.5: Status: ACTIVE | Noted: 2019-01-19

## 2019-01-19 LAB — HGB BLD-MCNC: 11 G/DL (ref 11.7–15.7)

## 2019-01-19 PROCEDURE — 97535 SELF CARE MNGMENT TRAINING: CPT | Mod: GO | Performed by: REHABILITATION PRACTITIONER

## 2019-01-19 PROCEDURE — 40000193 ZZH STATISTIC PT WARD VISIT

## 2019-01-19 PROCEDURE — 12000000 ZZH R&B MED SURG/OB

## 2019-01-19 PROCEDURE — 85018 HEMOGLOBIN: CPT | Performed by: ORTHOPAEDIC SURGERY

## 2019-01-19 PROCEDURE — 97530 THERAPEUTIC ACTIVITIES: CPT | Mod: GP

## 2019-01-19 PROCEDURE — 40000133 ZZH STATISTIC OT WARD VISIT: Performed by: REHABILITATION PRACTITIONER

## 2019-01-19 PROCEDURE — 36415 COLL VENOUS BLD VENIPUNCTURE: CPT | Performed by: ORTHOPAEDIC SURGERY

## 2019-01-19 PROCEDURE — 25000132 ZZH RX MED GY IP 250 OP 250 PS 637: Performed by: NURSE PRACTITIONER

## 2019-01-19 PROCEDURE — 25000132 ZZH RX MED GY IP 250 OP 250 PS 637: Performed by: ORTHOPAEDIC SURGERY

## 2019-01-19 PROCEDURE — 97116 GAIT TRAINING THERAPY: CPT | Mod: GP

## 2019-01-19 PROCEDURE — 97165 OT EVAL LOW COMPLEX 30 MIN: CPT | Mod: GO | Performed by: REHABILITATION PRACTITIONER

## 2019-01-19 RX ADMIN — HYDROMORPHONE HYDROCHLORIDE 2 MG: 2 TABLET ORAL at 13:18

## 2019-01-19 RX ADMIN — SENNOSIDES AND DOCUSATE SODIUM 2 TABLET: 8.6; 5 TABLET ORAL at 07:56

## 2019-01-19 RX ADMIN — BUPROPION HYDROCHLORIDE 150 MG: 150 TABLET, FILM COATED, EXTENDED RELEASE ORAL at 07:57

## 2019-01-19 RX ADMIN — ACETAMINOPHEN 975 MG: 325 TABLET, FILM COATED ORAL at 06:50

## 2019-01-19 RX ADMIN — ACETAMINOPHEN 975 MG: 325 TABLET, FILM COATED ORAL at 21:50

## 2019-01-19 RX ADMIN — TIZANIDINE 2 MG: 2 TABLET ORAL at 15:43

## 2019-01-19 RX ADMIN — ASPIRIN 325 MG: 325 TABLET, DELAYED RELEASE ORAL at 07:56

## 2019-01-19 RX ADMIN — DICLOFENAC 4 G: 10 GEL TOPICAL at 08:04

## 2019-01-19 RX ADMIN — HYDROXYZINE HYDROCHLORIDE 25 MG: 25 TABLET ORAL at 13:18

## 2019-01-19 RX ADMIN — DICLOFENAC 4 G: 10 GEL TOPICAL at 21:27

## 2019-01-19 RX ADMIN — HYDROMORPHONE HYDROCHLORIDE 2 MG: 2 TABLET ORAL at 19:13

## 2019-01-19 RX ADMIN — HYDROMORPHONE HYDROCHLORIDE 2 MG: 2 TABLET ORAL at 03:44

## 2019-01-19 RX ADMIN — HYDROXYZINE HYDROCHLORIDE 25 MG: 25 TABLET ORAL at 19:13

## 2019-01-19 RX ADMIN — Medication 1 SPRAY: at 07:59

## 2019-01-19 RX ADMIN — CHOLECALCIFEROL CAP 125 MCG (5000 UNIT) 5000 UNITS: 125 CAP at 07:56

## 2019-01-19 RX ADMIN — TIZANIDINE 2 MG: 2 TABLET ORAL at 22:12

## 2019-01-19 RX ADMIN — DICLOFENAC 4 G: 10 GEL TOPICAL at 17:40

## 2019-01-19 RX ADMIN — SENNOSIDES AND DOCUSATE SODIUM 2 TABLET: 8.6; 5 TABLET ORAL at 19:13

## 2019-01-19 RX ADMIN — HYDROMORPHONE HYDROCHLORIDE 2 MG: 2 TABLET ORAL at 06:51

## 2019-01-19 RX ADMIN — DICLOFENAC 4 G: 10 GEL TOPICAL at 13:12

## 2019-01-19 RX ADMIN — HYDROMORPHONE HYDROCHLORIDE 2 MG: 2 TABLET ORAL at 16:08

## 2019-01-19 RX ADMIN — ACETAMINOPHEN 975 MG: 325 TABLET, FILM COATED ORAL at 14:34

## 2019-01-19 RX ADMIN — HYDROMORPHONE HYDROCHLORIDE 2 MG: 2 TABLET ORAL at 09:57

## 2019-01-19 RX ADMIN — HYDROMORPHONE HYDROCHLORIDE 2 MG: 2 TABLET ORAL at 22:12

## 2019-01-19 RX ADMIN — ASPIRIN 325 MG: 325 TABLET, DELAYED RELEASE ORAL at 19:13

## 2019-01-19 RX ADMIN — LIDOCAINE 1 PATCH: 560 PATCH PERCUTANEOUS; TOPICAL; TRANSDERMAL at 21:52

## 2019-01-19 RX ADMIN — TIZANIDINE 2 MG: 2 TABLET ORAL at 07:56

## 2019-01-19 RX ADMIN — HYDROXYZINE HYDROCHLORIDE 25 MG: 25 TABLET ORAL at 00:43

## 2019-01-19 ASSESSMENT — ACTIVITIES OF DAILY LIVING (ADL)
IADL_COMMENTS: SO TO COMPLETE AS NEEDED
ADLS_ACUITY_SCORE: 13

## 2019-01-19 NOTE — PLAN OF CARE
"OT- eval completed and treatment initiated. Patient is POD #2 for L ODILON revision- per surgical notes- Complex revision left total hip arthroplasty with impaction grafting of the acetabulum and removal of broken screws with revision of both acetabular and femoral components- per op note. Patient to remain in hip abductor brace at all time for 6 weeks, per patient ortho PA has okay to remove for bathing after transfer to shower chair.     Discharge Planner OT   Patient plan for discharge: home   Current status: Significant time spent processing through basic ADL techniques, including LE dressing, bathing and toileting, and AE recommendations and options for safe bathroom transfers. Due to strict hip precautions and need for hip abductor brace, OT advised against trying to complete LE dressing with AE and recommended she has A with all LE dressing and bathing. Patient reports she thinks she can have her SO A, however is open to HHA. Also processed through hair washing, sponge bathing options to complete more regularity as total body bathing will be a large undertaking. CGA, fww for sit<>stand, ambulate to bathroom, sit on bariatric commode. I with darnell, SBA, fww for sinkside hand hygiene.     PM- Per PT, patient very overwhelmed and tearful in session. Met with patient to review recommendations and further process through options to make ADL's more manageable. Patient open and receptive to recommendations. Educated in walker safety, EC/WS techniques, advancement of activity following surgery, car transfers and driving readiness, patient was engaged in instruction and verbalized understanding. Patient was SBA for sit in bed to sit at EOB, CGA, fww, completed mock car transfer to transfer chair with min A to lift L LE in/out of \"car area\", CGA, fww to ambulate to bathroom and complete toileting cares. Returned to bed with min A.      Barriers to return to prior living situation: well below baseline for ADL's and " community mobility,   Recommendations for discharge: home with home safety evals by PT and OT, HHA for bathing.   Rationale for recommendations: Due to current need for basic ADL A, patient would benefit from home OT to A with caregiver education in brace management, safe LE dressing and bathing techniques and transfers into bathing facility as patient has a tiled deep whirlpool tub to transfer in/out of. As well as to assess home set-up to optimize safety and I.        Entered by: Carole Dumas 01/19/2019 3:45 PM

## 2019-01-19 NOTE — PLAN OF CARE
Discharge Planner PT     Patient plan for discharge: Home with assist or TCU  Current status: Pt verbalizes frustration with being able to care for herself at the current time (bathing/dressing, etc). Discussed safe options for discharge. Spoke with RN and requested a SW/CC consult to obtain more information about possible temporary supports at home or qualification process for TCU.   Barriers to return to prior living situation: Stairs; reminders needed for precautions; level of A needed with self care (pt reports she does not think her  will be able to provide this level of A).  Recommendations for discharge: TCU. If pt decides she does not want TCU, pt will need additional A at home. Would need FWW with all mobility, physical assist for all stair performance,  HHPT/OT, possibly HHA to assist with self cares.   Rationale for recommendations: Pt reporting she does not think she has level of A from  to safely manage within her home environment. Pt would benefit from continued IP PT services to address strength, balance and activity tolerance deficits and to assist with safe discharge planning.       Entered by: Waleska Vargas 01/19/2019 2:33 PM

## 2019-01-19 NOTE — PROGRESS NOTES
"Ai Boyer  2019  POD # 2 sp revision patricio    Admit Date:  2019      Doing well.  Normal healing wound.  No immediate surgical complications identified.  No excessive bleeding  Pain well-controlled.  Tolerating physical therapy and rehabilitation well.  Objective:  Blood pressure 92/61, pulse 85, temperature 98.5  F (36.9  C), temperature source Oral, resp. rate 16, height 1.727 m (5' 8\"), weight 93.4 kg (206 lb), SpO2 96 %, not currently breastfeeding.    Temperatures:  Current - Temp: 98.5  F (36.9  C); Max - Temp  Av.5  F (36.9  C)  Min: 97.6  F (36.4  C)  Max: 99.5  F (37.5  C)  Pulse range: Pulse  Av  Min: 85  Max: 85  Blood pressure range: Systolic (24hrs), Av , Min:92 , Max:115   ; Diastolic (24hrs), Av, Min:46, Max:61    Exam:  CMS: intact  alert, stable, wound ok  Calf nontender b le.       Labs:  No results for input(s): POTASSIUM in the last 46089 hours.  Recent Labs   Lab Test 19  0631 19  0715 19  0613   HGB 11.0* 11.5* 15.5     No results for input(s): INR in the last 68898 hours.  Recent Labs   Lab Test 18  1104 12  2050    228       PLAN: Non weight bearing  Continue physical therapy  Pain control measures  Discharge to home today. Will keep on keflex until cultures are complete.Abd   brace for 6 wks.       "

## 2019-01-19 NOTE — PLAN OF CARE
Pt A&O x4. VS stable; afebrile. PO dilaudid, zanaflex, vistaril, scheduled tylenol, voltaren gel, and lidocaine patches managing pain. CMS: baseline neuropathy in BLE's. Dressing CDI-incision iced. Up w/ A1, using gait belt, and walker. Brace on at all times-TTWB. Voiding in good amts-BM x1 this shift. Tolerating regular diet. Plan is home vs TCU @ discharge. Will continue to monitor.

## 2019-01-19 NOTE — PLAN OF CARE
Pt A&Ox4. Up with A1, walker,GB. Hip brace on at all times. Pain managed with dilaudid, tylenol. Lidocaine patch and volatran gel. Baseline neuropathy. Absent D/P due to foot drop. Voiding. Bowels +Flatus. VSS. Slept between cares.

## 2019-01-19 NOTE — PROGRESS NOTES
"Note Infectious Disease Consult Service Progress Note  Covering for Rosa De La Cruz & Carlos   Pt Name Ai Boyer   Date 01/19/2019   MRN [id number]       Notes / labs / imaging test results and other data were reviewed    CHIEF COMPLAINT: REASON FOR VISIT    Left hip revision - ? infection      HPI  58-year-old female is seen in consultation due to possible infected left total hip arthroplasty.  The patient has a history of a prior total hip arthroplasty 10 years ago but had some progressive problems over the last couple of years.  There has been loosening, acetabular screw break and some chromium in the area.  There are no significant signs of infection.  Inflammatory markers were normal.  No aspiration was done.  She underwent surgical intervention for revision.  No signs of infection at surgery found.  Initial Gram's stain report was negative, but has been amended to show probable gram-negative rods, although there were minimal white cells present.  Cultures are now pending.     1.19 Little pain / no fever / no diarrhea / no rash / no new headache      Remainder of 14-point ROS was negative except as listed above    PFSH:  Personal / Family / Social Histories were reviewed and updated  nothing new      Vital Signs: /51   Pulse 80   Temp 97  F (36.1  C) (Oral)   Resp 16   Ht 1.727 m (5' 8\")   Wt 93.4 kg (206 lb)   SpO2 96%   BMI 31.32 kg/m    EXAM    constitution   In bed  no acute distress      lungs   Clear  Speaks easily on room air     abd   Large / soft     skin   No rash exposed skin     neuro   Normal conversation / speech         Data  Cultures        -----------------------------------------------------------------------------------------------------        LABS  Lab Results   Component Value Date/Time    WBC 6.4 12/04/2018 11:04 AM           ASSESSMENT & SUGGESTIONS    (Z96.649) S/P revision of total hip  (primary encounter diagnosis)  ** positive gram stain synovial fluid  ** at risk for " "healthcare assoc'd infection    Not clear that has infection  Doing well w/o antibiotics at this time    OK w me discharge to home  - pt should call Dr De La Cruz first thing AM,  (Monday) to arrange f/u and update on cultures  - d/w pt that if cultures are positive, that she may need to start antibiotics ... May involve getting iv in  - dw pt potential interpretations of gram stain   True positive => would need extended course antibiotics   False positive => what was read as GNR really gram staining \"stuff\"   Contaminant => bacteria got onto or into specimen after collection, before reading it         Adeel Potter MD  Covering for Rosa Hall & Dorothy & Carlos  [facility] Infectious Diseases  [phone]      CURRENT MED REVIEWD    Prescription Medications as of 2019       Rx Number Disp Refills Start End Last Dispensed Date Next Fill Date Owning Pharmacy    buPROPion (WELLBUTRIN XL) 150 MG 24 hr tablet    10/22/2018        Si mg daily     Class: Historical    Cholecalciferol (VITAMIN D3 PO)            Sig: Take 5,000 Units by mouth daily     Class: Historical    Route: Oral    HYDROmorphone (DILAUDID) 2 MG tablet  30 tablet 0 2019   [facility] [address] "[employer] [facility]    Sig: Take 1-2 tablets (2-4 mg) by mouth every 4 hours as needed for breakthrough pain    Class: Local Print    Earliest Fill Date: 2019    Route: Oral    ibuprofen (ADVIL/MOTRIN) 200 MG tablet        [place] [address]    Sig: Take 200-400 mg by mouth every 6 hours as needed for mild pain    Class: Historical    Route: Oral    order for DME  1 each 0 2019   [facility] [address]    Sig: Equipment being ordered: Crutches ()  Treatment Diagnosis: Impaired gait    Class: Local Print    order for DME  1 Device 0 2017    [facility] [address] [place] [address]    Sig: Equipment being ordered: casey    Class: Local [facility] Medications as of 1/19/2019       Dose Frequency Start End    acetaminophen (TYLENOL) tablet 650 mg 650 mg EVERY 4 HOURS PRN 1/20/2019     Sig: Take 2 tablets (650 mg) by mouth every 4 hours as needed for other (multimodal surgical pain management along with NSAIDS and opioid medication as indicated based on pain control and physical function.)    Class: E-Prescribe    Route: Oral    acetaminophen (TYLENOL) tablet 975 mg 975 mg EVERY 8 HOURS 1/17/2019 1/20/2019    Sig: Take 3 tablets (975 mg) by mouth every 8 hours    Class: E-Prescribe    Route: Oral    artificial saliva (BIOTENE MT) solution 1 spray 1 spray EVERY 6 HOURS PRN 1/18/2019     Sig: Take 1 mL (1 spray) by mouth every 6 hours as needed for dry mouth    Class: E-Prescribe    Route: Mouth/Throat    aspirin (ASA) EC tablet 325 mg 325 mg 2 TIMES DAILY 1/18/2019     Sig: Take 1 tablet (325 mg) by mouth 2 times daily    Class: E-Prescribe    Route: Oral    benzocaine-menthol (CHLORASEPTIC) 6-10 MG lozenge 1-2 lozenge 1-2 lozenge EVERY 1 HOUR PRN 1/17/2019     Sig: Place 1-2 lozenges inside cheek every hour as needed for sore throat (sore throat without fever)    Class: E-Prescribe    Route: Buccal    buPROPion (WELLBUTRIN XL) 24 hr tablet 150 mg 150 mg DAILY 1/17/2019     Sig: Take 1 tablet (150 mg) by mouth daily    Class: E-Prescribe    Route: Oral    camphor-menthol (DERMASARRA) lotion  EVERY 6 HOURS PRN 1/18/2019     Sig: Apply topically every 6 hours as needed for skin care    Class: E-Prescribe    Route: Topical    cholecalciferol (VITAMIN D3) 5000 units (125 mcg) capsule 5,000 Units 5,000 Units DAILY 1/17/2019     Sig: Take 1 capsule (5,000 Units) by mouth daily    Class: E-Prescribe    Route: Oral    diclofenac (VOLTAREN) 1 % topical gel 4 g 4 g 4 TIMES DAILY 1/18/2019     Sig: Place 4 g onto the skin 4 times daily  "   Class: E-Prescribe    Route: Transdermal    HYDROmorphone (DILAUDID) tablet 2-4 mg 2-4 mg EVERY 3 HOURS PRN 2019     Sig: Take 1-2 tablets (2-4 mg) by mouth every 3 hours as needed for other (pain control or improvement in physical function. Hold dose for analgesic side effects.)    Route: Oral    HYDROmorphone (PF) (DILAUDID) injection 0.3-0.5 mg 0.3-0.5 mg EVERY 2 HOURS PRN 2019     Sig: Inject 0.3-0.5 mg into the vein every 2 hours as needed for other (pain control or improvement in physical function. Hold dose for analgesic side effects.)    Class: E-Prescribe    Route: Intravenous    hydrOXYzine (ATARAX) tablet 25 mg 25 mg EVERY 6 HOURS PRN 2019     Sig: Take 1 tablet (25 mg) by mouth every 6 hours as needed for itching    Class: E-Prescribe    Route: Oral    lactated ringers infusion  CONTINUOUS 2019     Sig: Inject into the vein continuous    Class: E-Prescribe    Route: Intravenous    Lidocaine (LIDOCARE) 4 % Patch 1 patch 1 patch EVERY 24 HOURS 2000 2019     Sig: Place 1 patch onto the skin every 24 hours at 8 PM    Class: E-Prescribe    Route: Transdermal    Linked Group 1:  \"And\" Linked Group Details        lidocaine (LMX4) cream  EVERY 1 HOUR PRN 2019     Sig: Apply topically every hour as needed for pain (with VAD insertion or accessing implanted port.)    Class: E-Prescribe    Route: Topical    lidocaine 1 % 1 mL 1 mL EVERY 1 HOUR PRN 2019     Si mL by Other route every hour as needed (mild pain with VAD insertion or accessing implanted port)    Class: E-Prescribe    Route: Other    lidocaine patch in PLACE  EVERY 8 HOURS 2019     Sig: Place onto the skin every 8 hours    Class: E-Prescribe    Route: Transdermal    Linked Group 1:  \"And\" Linked Group Details        lidocaine patch REMOVAL  EVERY 24 HOURS 0800 2019     Sig: Place onto the skin every 24 hours at 8 AM    Class: E-Prescribe    Route: Transdermal    Linked Group 1:  \"And\" Linked Group " "Details        naloxone (NARCAN) injection 0.1-0.4 mg 0.1-0.4 mg EVERY 2 MIN PRN 1/17/2019     Sig: Inject 0.25-1 mLs (0.1-0.4 mg) into the vein every 2 minutes as needed for opioid reversal    Class: E-Prescribe    Route: Intravenous    ondansetron (ZOFRAN) injection 4 mg 4 mg EVERY 6 HOURS PRN 1/17/2019     Sig: Inject 2 mLs (4 mg) into the vein every 6 hours as needed for nausea or vomiting    Class: E-Prescribe    Route: Intravenous    Linked Group 2:  \"Or\" Linked Group Details        ondansetron (ZOFRAN-ODT) ODT tab 4 mg 4 mg EVERY 6 HOURS PRN 1/17/2019     Sig: Take 1 tablet (4 mg) by mouth every 6 hours as needed for nausea or vomiting    Class: E-Prescribe    Route: Oral    Linked Group 2:  \"Or\" Linked Group Details        senna-docusate (SENOKOT-S/PERICOLACE) 8.6-50 MG per tablet 1 tablet 1 tablet 2 TIMES DAILY 1/17/2019     Sig: Take 1 tablet by mouth 2 times daily    Class: E-Prescribe    Route: Oral    Linked Group 3:  \"Or\" Linked Group Details        senna-docusate (SENOKOT-S/PERICOLACE) 8.6-50 MG per tablet 2 tablet 2 tablet 2 TIMES DAILY 1/17/2019     Sig: Take 2 tablets by mouth 2 times daily    Class: E-Prescribe    Route: Oral    Linked Group 3:  \"Or\" Linked Group Details        sodium chloride (PF) 0.9% PF flush 3 mL 3 mL EVERY 1 HOUR PRN 1/17/2019     Sig: 3 mLs by Intracatheter route every hour as needed for line flush (for peripheral IV flush post IV meds)    Class: E-Prescribe    Route: Intracatheter    sodium chloride (PF) 0.9% PF flush 3 mL 3 mL EVERY 8 HOURS 1/17/2019     Sig: 3 mLs by Intracatheter route every 8 hours    Class: E-Prescribe    Route: Intracatheter    tiZANidine (ZANAFLEX) tablet 2-4 mg 2-4 mg 3 TIMES DAILY PRN 1/17/2019     Sig: Take 1-2 tablets (2-4 mg) by mouth 3 times daily as needed for muscle spasms    Class: E-Prescribe    Route: Oral            "

## 2019-01-19 NOTE — PLAN OF CARE
Pt A&Ox4. Up with A1, walker, GB. Hip brace on at all times. TTWB. Dressing CDI. Pain managed with tylenol, dilaudid and Zanaflex.  Voltaran gel and lidocaine patch. Voiding adequately. Bowel +flatus. Regular diet. Discharge TBD, awaiting blood culture. VSS

## 2019-01-19 NOTE — PROGRESS NOTES
01/19/19 1141   Quick Adds   Type of Visit Initial Occupational Therapy Evaluation   Living Environment   Lives With significant other  (SO's mother)   Living Arrangements house   Home Accessibility stairs to enter home;stairs within home   Living Environment Comment Pt with mulitple stairs to enter home with no railings present, bedroom up full flight of stairs. Plans to stay on main level, has hospital bed and commode on main level to use.    Self-Care   Usual Activity Tolerance good   Current Activity Tolerance moderate   Activity/Exercise/Self-Care Comment Has rented a hospital bed, commode and RTs with armrests, extended shower bench and wc.   Functional Level   Ambulation 0-->independent   Transferring 0-->independent   Toileting 0-->independent   Bathing 0-->independent   Dressing 0-->independent   Eating 0-->independent   Communication 0-->understands/communicates without difficulty   Swallowing 0-->swallows foods/liquids without difficulty   Cognition 0 - no cognition issues reported   Fall history within last six months yes   Number of times patient has fallen within last six months 1   Which of the above functional risks had a recent onset or change? ambulation;transferring;toileting;bathing;dressing   General Information   Onset of Illness/Injury or Date of Surgery - Date 01/17/19   Referring Physician Dr. Candelario   Patient/Family Goals Statement to return home   Additional Occupational Profile Info/Pertinent History of Current Problem patient is POD #2 for L ODILON revision- per surgical notes- Complex revision left total hip arthroplasty with impaction grafting of the acetabulum and removal of broken screws with revision of both acetabular and femoral components   Precautions/Limitations fall precautions;left hip precautions  (anterior hip precautions, abductor brace for 6 wks)   Weight-Bearing Status - LLE touch down weight-bearing   General Observations patient was in wc and agreeable to OT session    Cognitive Status Examination   Orientation orientation to person, place and time   Level of Consciousness alert   Follows Commands (Cognition) WNL   Memory intact   Visual Perception   Visual Perception Wears glasses   Sensory Examination   Sensory Quick Adds No deficits were identified   Pain Assessment   Patient Currently in Pain Yes, see Vital Sign flowsheet  (rating not provided)   Integumentary/Edema   Integumentary/Edema no deficits were identifed   Posture   Posture not impaired   Range of Motion (ROM)   ROM Quick Adds No deficits were identified   Strength   Manual Muscle Testing Quick Adds No deficits were identified   Hand Strength   Hand Strength Comments intact   Muscle Tone Assessment   Muscle Tone Quick Adds No deficits were identified   Coordination   Upper Extremity Coordination No deficits were identified   Mobility   Bed Mobility Comments not assessed   Transfer Skill: Sit to Stand   Level of [place]: Sit/Stand contact guard   Physical Assist/Nonphysical Assist: Sit/Stand supervision   Transfer Skill: Sit to Stand weight-bearing as tolerated   Assistive Device for Transfer: Sit/Stand rolling walker   Toilet Transfer   Toilet Transfer Comments treatment complete - defer to OT daily note for details   Balance   Balance Comments LOB was not noted   Lower Body Dressing   Level of [place]: Dress Lower Body (treatment complete - defer to OT daily note for details)   Grooming   Level of [place]: Grooming (treatment complete - defer to OT daily note for details)   Eating/Self Feeding   Level of [place]: Eating independent   Instrumental Activities of Daily Living (IADL)   IADL Comments SO to complete as needed   Activities of Daily Living Analysis   Impairments Contributing to Impaired Activities of Daily Living strength decreased;post surgical precautions;pain   General Therapy Interventions   Planned Therapy Interventions ADL retraining;transfer training;progressive  "activity/exercise   Clinical Impression   Criteria for Skilled Therapeutic Interventions Met yes, treatment indicated   OT Diagnosis decreased ADL's   Influenced by the following impairments strength decreased;post surgical precautions;pain   Assessment of Occupational Performance 5 or more Performance Deficits   Identified Performance Deficits decreased ADL's and IADL's- dsg, toileting, bathing, community and functional mobility, household chores, driving   Clinical Decision Making (Complexity) Low complexity   Therapy Frequency daily   Predicted Duration of Therapy Intervention (days/wks) 2 days   Anticipated Equipment Needs at Discharge bath sponge  (drop arm commode or toilet safety frame)   Anticipated Discharge Disposition Home with Home Therapy   Risks and Benefits of Treatment have been explained. Yes   Patient, Family & other staff in agreement with plan of care Yes   [facility] TM \"6 Clicks\"   2016, Trustees of [facility], under license to [employer].  All rights reserved.   6 Clicks Short Forms Daily Activity Inpatient Short Form   [facility]  \"6 Clicks\" Daily Activity Inpatient Short Form   1. Putting on and taking off regular lower body clothing? 1 - Total   2. Bathing (including washing, rinsing, drying)? 2 - A Lot   3. Toileting, which includes using toilet, bedpan or urinal? 3 - A Little   4. Putting on and taking off regular upper body clothing? 4 - None   5. Taking care of personal grooming such as brushing teeth? 4 - None   6. Eating meals? 4 - None   Daily Activity Raw Score (Score out of 24.Lower scores equate to lower levels of function) 18   Total Evaluation Time   Total Evaluation Time (Minutes) 10     "

## 2019-01-19 NOTE — CONSULTS
Care Transition Initial Assessment -   Reason For Consult: discharge planning  Met with: patient     Active Problems:    S/P revision of total hip       DATA  Lives With: SO and his mother   Living Arrangements: house  Quality of Family Relationships: helpful, involved, supportive   Quality of Family Relationships: helpful, involved, supportive  Transportation Anticipated: family or friend will provide    ASSESSMENT  Cognitive Status: A&O.  I met with pt who reports that she lives with her SO and his mother who has Alzheimer's.  Pt has adult twin dtr's however they do not live near her.  One lives in [place] and the other one lives in [place].  Pt works full time as a Behavioral Analyst.  They have someone who makes meals for her SO mother.  Pt is agreeable with  HC PT/OT/HHA.  I have verified her address which is different than the .  [address] [place] [address].        PLAN  Financial costs for the patient includes: unknown    Patient given options and choices for discharge: yes  Patient/family is agreeable to the plan? yes  Patient Goals and Preferences: return home with [facility] PT/OT/HHA  Patient anticipates discharging to:  home      MD WILL NEED TO PUT ORDER IN FOR HC PT/OT/HHA

## 2019-01-19 NOTE — PLAN OF CARE
Discharge Planner PT     Patient plan for discharge: Home with assist  Current status: Sit to/from stand with CGA. Ambulates several short distances with FWW and CGA; intermittent cues needed to keep TTWB. Several trials on platform steps with multiple techniques. Able to complete with min A x 1, CGA of another with retro-steps and assist for precautions. Performs 4 stairs with unilateral crutch/rail and min A.   Barriers to return to prior living situation: Stairs - will continue to address in IP PT; reminders needed for precautions  Recommendations for discharge: Home with Home PT and assist from significant other or children for gait on stairs and to don AFO due to hip precautions.    Rationale for recommendations: Continued skilled PT in home environment as traveling to OP PT difficult for patient to enter/exit home with TTWB precautions.  Pt also has limited gait distance and activity tolerance.  Continued Home PT to increase L LE strength and progress independence with all functional mobility, including gait on stairs, to enable pt to increase safety and independence with mobility in her home environment.       Entered by: Waleska Vargas 01/19/2019 11:54 AM

## 2019-01-20 ENCOUNTER — APPOINTMENT (OUTPATIENT)
Dept: OCCUPATIONAL THERAPY | Facility: CLINIC | Age: 59
DRG: 468 | End: 2019-01-20
Attending: ORTHOPAEDIC SURGERY
Payer: COMMERCIAL

## 2019-01-20 ENCOUNTER — APPOINTMENT (OUTPATIENT)
Dept: PHYSICAL THERAPY | Facility: CLINIC | Age: 59
DRG: 468 | End: 2019-01-20
Attending: ORTHOPAEDIC SURGERY
Payer: COMMERCIAL

## 2019-01-20 VITALS
DIASTOLIC BLOOD PRESSURE: 54 MMHG | OXYGEN SATURATION: 96 % | RESPIRATION RATE: 16 BRPM | HEART RATE: 80 BPM | SYSTOLIC BLOOD PRESSURE: 136 MMHG | BODY MASS INDEX: 31.22 KG/M2 | TEMPERATURE: 98 F | HEIGHT: 68 IN | WEIGHT: 206 LBS

## 2019-01-20 PROCEDURE — 97530 THERAPEUTIC ACTIVITIES: CPT | Mod: GO | Performed by: REHABILITATION PRACTITIONER

## 2019-01-20 PROCEDURE — 25000132 ZZH RX MED GY IP 250 OP 250 PS 637: Performed by: ORTHOPAEDIC SURGERY

## 2019-01-20 PROCEDURE — 25000132 ZZH RX MED GY IP 250 OP 250 PS 637: Performed by: NURSE PRACTITIONER

## 2019-01-20 PROCEDURE — 40000193 ZZH STATISTIC PT WARD VISIT

## 2019-01-20 PROCEDURE — 97535 SELF CARE MNGMENT TRAINING: CPT | Mod: GO | Performed by: REHABILITATION PRACTITIONER

## 2019-01-20 PROCEDURE — 40000133 ZZH STATISTIC OT WARD VISIT: Performed by: REHABILITATION PRACTITIONER

## 2019-01-20 PROCEDURE — 97116 GAIT TRAINING THERAPY: CPT | Mod: GP

## 2019-01-20 RX ORDER — AMOXICILLIN 250 MG
1 CAPSULE ORAL 2 TIMES DAILY
Qty: 60 TABLET | Refills: 0 | Status: SHIPPED | OUTPATIENT
Start: 2019-01-20 | End: 2019-02-19

## 2019-01-20 RX ORDER — HYDROXYZINE HYDROCHLORIDE 25 MG/1
25 TABLET, FILM COATED ORAL EVERY 6 HOURS PRN
Qty: 30 TABLET | Refills: 0 | Status: SHIPPED | OUTPATIENT
Start: 2019-01-20 | End: 2019-01-30

## 2019-01-20 RX ORDER — ONDANSETRON 4 MG/1
4 TABLET, ORALLY DISINTEGRATING ORAL EVERY 6 HOURS PRN
Qty: 30 TABLET | Refills: 0 | Status: SHIPPED | OUTPATIENT
Start: 2019-01-20 | End: 2019-01-27

## 2019-01-20 RX ORDER — ASPIRIN 325 MG
325 TABLET, DELAYED RELEASE (ENTERIC COATED) ORAL 2 TIMES DAILY
Qty: 60 TABLET | Refills: 0 | Status: SHIPPED | OUTPATIENT
Start: 2019-01-20 | End: 2019-02-19

## 2019-01-20 RX ADMIN — HYDROMORPHONE HYDROCHLORIDE 2 MG: 2 TABLET ORAL at 06:12

## 2019-01-20 RX ADMIN — TIZANIDINE 2 MG: 2 TABLET ORAL at 04:02

## 2019-01-20 RX ADMIN — CHOLECALCIFEROL CAP 125 MCG (5000 UNIT) 5000 UNITS: 125 CAP at 07:57

## 2019-01-20 RX ADMIN — ACETAMINOPHEN 975 MG: 325 TABLET, FILM COATED ORAL at 06:15

## 2019-01-20 RX ADMIN — ASPIRIN 325 MG: 325 TABLET, DELAYED RELEASE ORAL at 07:57

## 2019-01-20 RX ADMIN — BUPROPION HYDROCHLORIDE 150 MG: 150 TABLET, FILM COATED, EXTENDED RELEASE ORAL at 07:57

## 2019-01-20 RX ADMIN — HYDROMORPHONE HYDROCHLORIDE 2 MG: 2 TABLET ORAL at 02:22

## 2019-01-20 RX ADMIN — HYDROMORPHONE HYDROCHLORIDE 2 MG: 2 TABLET ORAL at 11:01

## 2019-01-20 ASSESSMENT — ACTIVITIES OF DAILY LIVING (ADL)
ADLS_ACUITY_SCORE: 13

## 2019-01-20 NOTE — DISCHARGE INSTRUCTIONS
I have made a referral to [facility] for physical therapy, occupational therapy and home health aid.  [phone]     Signed,   Dr. Kat Wiggins PA-C

## 2019-01-20 NOTE — PLAN OF CARE
Discharge Planner PT     Patient plan for discharge: Home with assist  Current status: Pt needs assist to don L LE AFO/shoe. Supine to sit with SBA. Sit to/from stand with CGA. Performs a platform step with FWW, min A. Performs 4 stairs with unilateral crutch/rail and min A.   Barriers to return to prior living situation: None with anticipated assist for self cares and stairs.   Recommendations for discharge: Home with additional assist. Would need FWW with all mobility, physical assist for all stair performance,  HHPT/OT, possibly HHA to assist with self cares.   Rationale for recommendations: Pt reporting she feels she will be able to manage at home with assist provided and coordinated. Pt would benefit from HHPT to continue to progress IND/safety with stairs. Pt would have trouble getting to a clinic for OP services.        Entered by: Waleska Vargas 01/20/2019 1:05 PM        Physical Therapy Discharge Summary    Reason for therapy discharge:    Discharged to home with home therapy.    Progress towards therapy goal(s). See goals on Care Plan in [facility] electronic health record for goal details.  Goals not met.  Barriers to achieving goals:   discharge from facility.    Therapy recommendation(s):    Continued therapy is recommended.  Rationale/Recommendations:  See above.

## 2019-01-20 NOTE — PLAN OF CARE
Pt. A&Ox4, up with Assist of 1 and walker to bedside commode. Compliant with activity restrictions. L hip dressing intact - pt concerned with removal timing. Wearing brace at all times. CMS intact and at baseline. No bowel movement since surgery - stool softeners continue. Left foot wound dressing intact. ID evaluated pt - plan for pt to contact ID clinic Monday for follow up with cultures and determine potential need for antibiotic. Prescribed crutches in PT office. Pt. To discharge to home with [facility] PT/OT/HHA possibly today. Pt. Requesting pain medication prescriptions for vistaril, zanaflex, and voltaren gel prior to discharge. Pt. Denies any needs at this time. Will continue with POC.

## 2019-01-20 NOTE — PROGRESS NOTES
KIMANI sent home care referral to [place]. Patient has a different address then what is on the face sheet ([address] [place] [address]).         VERA Dawn  [phone]

## 2019-01-20 NOTE — PLAN OF CARE
RN - VSS. Pt pain well controlled with multiple modalities. Utilizing dilaudid, vistaril, zanaflex, voltaren gel, and lidocaine patches for relief. Pt likely discharging tomorrow - currently has only prescription for dilaudid filled - requesting additional medications being used to be filled as well. L hip dressing intact - pt concerned with removal timing. Wearing brace at all times. CMS intact and at baseline. Up with 1 and walker - utilizing bedside commode for voiding. No bowel movement since surgery - stool softeners continue. Compliant with activity restrictions. Tolerating regular diet. Left foot wound dressing intact. ID evaluated pt - plan for pt to contact ID clinic Monday for follow up with cultures and determine potential need for antibiotic. Prescribed crutches in PT office.

## 2019-01-20 NOTE — DISCHARGE SUMMARY
Orthopedic Discharge Summary   Patient: Ai Boyer  Admission Date: 2019  Discharge Date: 19  Date of Service: 2019  Attending Provider: More Stephens MD  Admission Diagnosis: S/P revision of total hip [Z96.649]  Discharge Diagnosis: s/p revision of total hip replacement  Procedures Performed: revision total hip replacement  Complications: None apparent   History of Present Illness: see operative report for full HPI  Past Medical History:   Past Medical History:   Diagnosis Date     Arthritis      At risk for healthcare associated infection 2019     Foot drop, bilateral      IBS (irritable bowel syndrome)      Peripheral neuropathy      Patient Active Problem List   Diagnosis     Localized osteoarthrosis, ankle and foot     S/P revision of total hip     Positive Gram stain of synovial fluid obtained by arthrocentesis     At risk for healthcare associated infection     Past Surgical History:   Procedure Laterality Date      knee arthroscopy  Left     meniscus repair     ARTHRODESIS ANKLE  2012    Procedure:ARTHRODESIS ANKLE; right ankle medial closing wedge ostetomy intermedually nailing removal of hardware; Surgeon:HUMA GARRETT; Location:[facility]     ARTHROPLASTY REVISION HIP Left 2019    Procedure: Revision left total hip arthroplasty;  Surgeon: More Stephens MD;  Location: [facility] OR      SECTION       GYN SURGERY      oopherectomy     ORTHOPEDIC SURGERY Left 2009    left hip      right ankle fusion  Right      Social History     Socioeconomic History     Marital status:      Spouse name: Not on file     Number of children: 2     Years of education: Not on file     Highest education level: Not on file   Social Needs     Financial resource strain: Not on file     Food insecurity - worry: Not on file     Food insecurity - inability: Not on file     Transportation needs - medical: Not on file     Transportation needs - non-medical: Not on file   Occupational  History     Occupation: behavior analyst   Tobacco Use     Smoking status: Current Every Day Smoker     Packs/day: 1.00     Types: Cigarettes     Smokeless tobacco: Never Used     Tobacco comment: 1-pack daily    Substance and Sexual Activity     Alcohol use: Yes     Comment: 1 - 4 times per week     Drug use: No     Sexual activity: Not on file   Other Topics Concern     Parent/sibling w/ CABG, MI or angioplasty before 65F 55M? Not Asked   Social History Narrative     Not on file     Medications on admission:   Medications Prior to Admission   Medication Sig Dispense Refill Last Dose     buPROPion (WELLBUTRIN XL) 150 MG 24 hr tablet 150 mg daily    1/16/2019 at Unknown time     Cholecalciferol (VITAMIN D3 PO) Take 5,000 Units by mouth daily    1/16/2019 at Unknown time     ibuprofen (ADVIL/MOTRIN) 200 MG tablet Take 200-400 mg by mouth every 6 hours as needed for mild pain   1/10/2019     order for DME Equipment being ordered: [employer] (Patient not taking: Reported on 12/4/2018) 1 Device 0 Not Taking     Allergies:    Allergies   Allergen Reactions     Sporanox [Itraconazole] Anaphylaxis     Amoxicillin Itching     Ceftin Hives     Clindamycin Hcl Itching     Codeine Sulfate Nausea     Morphine Hcl Hives     Keflex [Cephalexin Hcl] Rash       Hospital Course: Patient was admitted to Orthopedics and brought to the OR on where she underwent the above named procedure. Postoperatively she did very well with no apparent complications, and she had an uneventful hospital stay. Antibiotics were given for 24 hours after surgery. She was given aspirin for DVT prophylaxis and her pain was well controlled with oral medications, then transitioned to oral pain medications during her hospital stay. She progressed well with physical therapy and discharge to home was recommended. Her chronic medical problems were followed by the medicine team during her hospital stay and there were no significant changes to conditions or treatment  plans. No new medical problems presented during her hospital stay.   Consultations Obtained During Hospitalization:  1. Internal medicine for management of comorbid conditions and home medication management.  2. Physical Therapy for gait training, mobilization, range of motion and strengthening exercises  3. Occupational Therapy for activities of daily living.  4. Social Work for disposition planning.  5. Infectious Disease consult  Active Problems:    Positive Gram stain of synovial fluid obtained by arthrocentesis    At risk for healthcare associated infection    S/P revision of total hip    Discharge Disposition: patient improving  Discharge Diet: resume normal diet  Discharge Medications:   Current Discharge Medication List      START taking these medications    Details   aspirin (ASA) 325 MG EC tablet Take 1 tablet (325 mg) by mouth 2 times daily  Qty: 60 tablet, Refills: 0    Associated Diagnoses: S/P revision of total hip      diclofenac (VOLTAREN) 1 % topical gel Place 4 g onto the skin 4 times daily Do not use near the incision.  Qty: 1 Tube, Refills: 0    Associated Diagnoses: S/P revision of total hip      HYDROmorphone (DILAUDID) 2 MG tablet Take 1-2 tablets (2-4 mg) by mouth every 4 hours as needed for breakthrough pain  Qty: 30 tablet, Refills: 0    Associated Diagnoses: S/P revision of total hip      hydrOXYzine (ATARAX) 25 MG tablet Take 1 tablet (25 mg) by mouth every 6 hours as needed for itching  Qty: 30 tablet, Refills: 0    Associated Diagnoses: S/P revision of total hip      ondansetron (ZOFRAN-ODT) 4 MG ODT tab Take 1 tablet (4 mg) by mouth every 6 hours as needed for nausea or vomiting  Qty: 30 tablet, Refills: 0    Associated Diagnoses: S/P revision of total hip      !! order for DME Equipment being ordered: Crutches ()  Treatment Diagnosis: Impaired gait  Qty: 1 each, Refills: 0    Associated Diagnoses: S/P revision of total hip      senna-docusate (SENOKOT-S/PERICOLACE) 8.6-50 MG  tablet Take 1 tablet by mouth 2 times daily  Qty: 60 tablet, Refills: 0    Associated Diagnoses: S/P revision of total hip       !! - Potential duplicate medications found. Please discuss with provider.      CONTINUE these medications which have NOT CHANGED    Details   buPROPion (WELLBUTRIN XL) 150 MG 24 hr tablet 150 mg daily     Associated Diagnoses: Erythrocytosis      Cholecalciferol (VITAMIN D3 PO) Take 5,000 Units by mouth daily       ibuprofen (ADVIL/MOTRIN) 200 MG tablet Take 200-400 mg by mouth every 6 hours as needed for mild pain      !! order for DME Equipment being ordered: [employer]  Qty: 1 Device, Refills: 0    Associated Diagnoses: Closed fracture of neck of metatarsal bone of left foot, initial encounter       !! - Potential duplicate medications found. Please discuss with provider.        Code Status: full code  X-rays needed at the follow up visit:   Hip abduction brace at all times  TTWB in the LLE with walker for 6 weeks.   Follow up with ID team in regards to her cultures.   Myles Wiggins PA-C  1/20/2019 12:03 PM   MSIAEL Wade  [phone]

## 2019-01-20 NOTE — PLAN OF CARE
Patient's After Visit Summary was reviewed with patient and/or daughter  Patient verbalized understanding of After Visit Summary, recommended follow up and was given an opportunity to ask questions.   Discharge medications sent home with patient/family  Discharged with daughter  A&Ox4, ax1 w/ walker and brace, regular diet, heart sounds- WNL, lungs- clear, bowels- active, voiding without difficulty, no IV access, dilaudid for pain.

## 2019-01-20 NOTE — PLAN OF CARE
Discharge Planner OT   Patient plan for discharge: home, hoping today  Current status: Reviewed previous day education with patient, who denied having any questions and concerns. I with bed mobility from elevated position to sit at EOB. CGA, fww for sit<>stand and to ambulate to bathroom. Processed through various options for shower transfer. With use of grab bars, fww and min A to control transfer to sit, patient able to sit on shower bench. Once seated hip abductor was removed, and mod A provided to transfer L LE into shower area. Patient and nursing staff educated in how to A patient with showering and donning brace. Patient denied further questions tor concerns, understands precautions and basic ADL management at home. Will discharge from OT services.   Barriers to return to prior living situation: below baseline for ADL's/IADL's and community mobility  Recommendations for discharge: home with A from family and SO as well  as home safety evals by PT and OT, HHA for bathing.   Rationale for recommendations:Patient has met needed goals for safe return home with SO and family A, however due to current need for higher A for basic ADL's and SO and family has not been present for education, patient would benefit from home OT to A with caregiver education in brace management, safe LE dressing and bathing techniques and transfers into bathing facility as patient has a tiled deep whirlpool tub to transfer in/out of. As well as to assess home set-up to optimize safety and I. At this time, will discharge from OT services.        Entered by: Carole Dumas 01/20/2019 9:35 AM       Occupational Therapy Discharge Summary    Reason for therapy discharge:    All goals and outcomes met, no further needs identified.    Progress towards therapy goal(s). [facility] for goal details.  Goals met    Therapy recommendation(s):    No further therapy is recommended.

## 2019-01-21 LAB
BACTERIA SPEC CULT: NO GROWTH
GRAM STN SPEC: ABNORMAL
GRAM STN SPEC: ABNORMAL
Lab: ABNORMAL
Lab: NORMAL
SPECIMEN SOURCE: ABNORMAL
SPECIMEN SOURCE: NORMAL

## 2019-01-24 LAB
BACTERIA SPEC CULT: NORMAL
Lab: NORMAL
SPECIMEN SOURCE: NORMAL

## 2019-02-15 ENCOUNTER — HEALTH MAINTENANCE LETTER (OUTPATIENT)
Age: 59
End: 2019-02-15

## 2019-04-25 DIAGNOSIS — Z47.89 AFTERCARE FOLLOWING SURGERY OF THE MUSCULOSKELETAL SYSTEM: ICD-10-CM

## 2019-04-25 DIAGNOSIS — Z47.89 AFTERCARE FOLLOWING SURGERY OF THE MUSCULOSKELETAL SYSTEM: Primary | ICD-10-CM

## 2019-04-25 PROCEDURE — 82495 ASSAY OF CHROMIUM: CPT | Mod: 90 | Performed by: ORTHOPAEDIC SURGERY

## 2019-04-25 PROCEDURE — 99000 SPECIMEN HANDLING OFFICE-LAB: CPT | Performed by: ORTHOPAEDIC SURGERY

## 2019-04-25 PROCEDURE — 36415 COLL VENOUS BLD VENIPUNCTURE: CPT | Performed by: ORTHOPAEDIC SURGERY

## 2019-04-25 PROCEDURE — 83018 HEAVY METAL QUAN EACH NES: CPT | Mod: 90 | Performed by: ORTHOPAEDIC SURGERY

## 2019-04-30 LAB — COBALT SERPL-MCNC: 10.4 UG/L

## 2019-05-01 LAB — CR SERPL-MCNC: 14.8 UG/L

## 2019-07-29 DIAGNOSIS — Z47.89 AFTERCARE FOLLOWING SURGERY OF THE MUSCULOSKELETAL SYSTEM: Primary | ICD-10-CM

## 2019-07-29 PROCEDURE — 83018 HEAVY METAL QUAN EACH NES: CPT | Mod: 90 | Performed by: ORTHOPAEDIC SURGERY

## 2019-07-29 PROCEDURE — 82495 ASSAY OF CHROMIUM: CPT | Mod: 90 | Performed by: ORTHOPAEDIC SURGERY

## 2019-07-29 PROCEDURE — 36415 COLL VENOUS BLD VENIPUNCTURE: CPT | Performed by: ORTHOPAEDIC SURGERY

## 2019-07-29 PROCEDURE — 99000 SPECIMEN HANDLING OFFICE-LAB: CPT | Performed by: ORTHOPAEDIC SURGERY

## 2019-08-02 LAB — CR SERPL-MCNC: 9.3 UG/L

## 2019-08-08 LAB — COBALT SERPL-MCNC: 6.5 UG/L

## 2019-09-28 ENCOUNTER — HEALTH MAINTENANCE LETTER (OUTPATIENT)
Age: 59
End: 2019-09-28

## 2019-12-30 DIAGNOSIS — Z47.89 AFTERCARE FOLLOWING SURGERY OF THE MUSCULOSKELETAL SYSTEM, NEC: Primary | ICD-10-CM

## 2019-12-30 PROCEDURE — 82495 ASSAY OF CHROMIUM: CPT | Mod: 90 | Performed by: ORTHOPAEDIC SURGERY

## 2019-12-30 PROCEDURE — 83018 HEAVY METAL QUAN EACH NES: CPT | Mod: 90 | Performed by: ORTHOPAEDIC SURGERY

## 2019-12-30 PROCEDURE — 99000 SPECIMEN HANDLING OFFICE-LAB: CPT | Performed by: ORTHOPAEDIC SURGERY

## 2019-12-30 PROCEDURE — 36415 COLL VENOUS BLD VENIPUNCTURE: CPT | Performed by: ORTHOPAEDIC SURGERY

## 2020-01-03 LAB
COBALT SERPL-MCNC: 6.7 UG/L
CR SERPL-MCNC: 10.3 UG/L

## 2021-01-10 ENCOUNTER — HEALTH MAINTENANCE LETTER (OUTPATIENT)
Age: 61
End: 2021-01-10

## 2021-10-23 ENCOUNTER — HEALTH MAINTENANCE LETTER (OUTPATIENT)
Age: 61
End: 2021-10-23

## 2021-12-02 ENCOUNTER — OFFICE VISIT (OUTPATIENT)
Dept: NEUROLOGY | Facility: CLINIC | Age: 61
End: 2021-12-02
Payer: COMMERCIAL

## 2021-12-02 VITALS
DIASTOLIC BLOOD PRESSURE: 84 MMHG | BODY MASS INDEX: 29.56 KG/M2 | SYSTOLIC BLOOD PRESSURE: 163 MMHG | HEIGHT: 70 IN | HEART RATE: 89 BPM

## 2021-12-02 DIAGNOSIS — M21.371 FOOT DROP, BILATERAL: ICD-10-CM

## 2021-12-02 DIAGNOSIS — M21.372 FOOT DROP, BILATERAL: ICD-10-CM

## 2021-12-02 DIAGNOSIS — G60.9 IDIOPATHIC NEUROPATHY: Primary | ICD-10-CM

## 2021-12-02 LAB
ALBUMIN SERPL-MCNC: 3.8 G/DL (ref 3.4–5)
ALP SERPL-CCNC: 75 U/L (ref 40–150)
ALT SERPL W P-5'-P-CCNC: 34 U/L (ref 0–50)
ANION GAP SERPL CALCULATED.3IONS-SCNC: 4 MMOL/L (ref 3–14)
AST SERPL W P-5'-P-CCNC: 21 U/L (ref 0–45)
BILIRUB SERPL-MCNC: 0.2 MG/DL (ref 0.2–1.3)
BUN SERPL-MCNC: 13 MG/DL (ref 7–30)
CALCIUM SERPL-MCNC: 9.4 MG/DL (ref 8.5–10.1)
CHLORIDE BLD-SCNC: 110 MMOL/L (ref 94–109)
CO2 SERPL-SCNC: 28 MMOL/L (ref 20–32)
CREAT SERPL-MCNC: 0.88 MG/DL (ref 0.52–1.04)
ERYTHROCYTE [DISTWIDTH] IN BLOOD BY AUTOMATED COUNT: 13.1 % (ref 10–15)
GFR SERPL CREATININE-BSD FRML MDRD: 72 ML/MIN/1.73M2
GLUCOSE BLD-MCNC: 108 MG/DL (ref 70–99)
HCT VFR BLD AUTO: 46.5 % (ref 35–47)
HGB BLD-MCNC: 15.3 G/DL (ref 11.7–15.7)
MCH RBC QN AUTO: 30.2 PG (ref 26.5–33)
MCHC RBC AUTO-ENTMCNC: 32.9 G/DL (ref 31.5–36.5)
MCV RBC AUTO: 92 FL (ref 78–100)
PLATELET # BLD AUTO: 200 10E3/UL (ref 150–450)
POTASSIUM BLD-SCNC: 3.4 MMOL/L (ref 3.4–5.3)
PROT SERPL-MCNC: 6.6 G/DL (ref 6.8–8.8)
RBC # BLD AUTO: 5.06 10E6/UL (ref 3.8–5.2)
SODIUM SERPL-SCNC: 142 MMOL/L (ref 133–144)
TSH SERPL DL<=0.005 MIU/L-ACNC: 1.28 MU/L (ref 0.4–4)
VIT B12 SERPL-MCNC: 441 PG/ML (ref 193–986)
WBC # BLD AUTO: 4.5 10E3/UL (ref 4–11)

## 2021-12-02 PROCEDURE — 82607 VITAMIN B-12: CPT | Performed by: STUDENT IN AN ORGANIZED HEALTH CARE EDUCATION/TRAINING PROGRAM

## 2021-12-02 PROCEDURE — 84207 ASSAY OF VITAMIN B-6: CPT | Mod: 90 | Performed by: STUDENT IN AN ORGANIZED HEALTH CARE EDUCATION/TRAINING PROGRAM

## 2021-12-02 PROCEDURE — 86334 IMMUNOFIX E-PHORESIS SERUM: CPT | Performed by: PATHOLOGY

## 2021-12-02 PROCEDURE — 99000 SPECIMEN HANDLING OFFICE-LAB: CPT | Performed by: STUDENT IN AN ORGANIZED HEALTH CARE EDUCATION/TRAINING PROGRAM

## 2021-12-02 PROCEDURE — 82495 ASSAY OF CHROMIUM: CPT | Mod: 90 | Performed by: STUDENT IN AN ORGANIZED HEALTH CARE EDUCATION/TRAINING PROGRAM

## 2021-12-02 PROCEDURE — 85027 COMPLETE CBC AUTOMATED: CPT | Performed by: STUDENT IN AN ORGANIZED HEALTH CARE EDUCATION/TRAINING PROGRAM

## 2021-12-02 PROCEDURE — 84425 ASSAY OF VITAMIN B-1: CPT | Mod: 90 | Performed by: STUDENT IN AN ORGANIZED HEALTH CARE EDUCATION/TRAINING PROGRAM

## 2021-12-02 PROCEDURE — 99205 OFFICE O/P NEW HI 60 MIN: CPT | Performed by: STUDENT IN AN ORGANIZED HEALTH CARE EDUCATION/TRAINING PROGRAM

## 2021-12-02 PROCEDURE — 84443 ASSAY THYROID STIM HORMONE: CPT | Performed by: STUDENT IN AN ORGANIZED HEALTH CARE EDUCATION/TRAINING PROGRAM

## 2021-12-02 PROCEDURE — 82525 ASSAY OF COPPER: CPT | Mod: 90 | Performed by: STUDENT IN AN ORGANIZED HEALTH CARE EDUCATION/TRAINING PROGRAM

## 2021-12-02 PROCEDURE — 36415 COLL VENOUS BLD VENIPUNCTURE: CPT | Performed by: STUDENT IN AN ORGANIZED HEALTH CARE EDUCATION/TRAINING PROGRAM

## 2021-12-02 PROCEDURE — 80053 COMPREHEN METABOLIC PANEL: CPT | Performed by: STUDENT IN AN ORGANIZED HEALTH CARE EDUCATION/TRAINING PROGRAM

## 2021-12-02 PROCEDURE — 84630 ASSAY OF ZINC: CPT | Mod: 90 | Performed by: STUDENT IN AN ORGANIZED HEALTH CARE EDUCATION/TRAINING PROGRAM

## 2021-12-02 RX ORDER — VENLAFAXINE HYDROCHLORIDE 75 MG/1
1 CAPSULE, EXTENDED RELEASE ORAL DAILY
COMMUNITY
Start: 2021-11-15 | End: 2022-03-21

## 2021-12-02 ASSESSMENT — PAIN SCALES - GENERAL: PAINLEVEL: MODERATE PAIN (4)

## 2021-12-02 NOTE — PROGRESS NOTES
[facility]/[place]  Section of General Neurology  New Patient Visit      Ai Boyer MRN# [id number]   Age: 61 year old YOB: 1960     Requesting physician: No ref. provider found  Dangelo Koehler A     Reason for Consultation: Neuropathy        History of Presenting Symptoms:   Ai Boyer is a 61 year old female who presents today for evaluation of neuropathy.    She previously followed with Dr. Verde in the 90s.  She is a former  had to wear heels and walk a lot back in those tiems.  Had bilateral bunion surgery several on both sides.    She developed foot symptoms and was worried she had ALS. This was thoroughly worked up including a spinal tap, DNA testing, nerve biopsies all negative, she reports, though this data is quite old and I cannot find it in our charting from the late 1990s.    EMG at that time was quite profound as well. Reportedly.  But all of this testing could not reveal why the neuropathy. Trial of IVIG also not helpful, per the patient.    Later had R ankle fused, osman is was then later taken out.  New one put in in 2006.  Last surgery on R ankle 2012.  She does still have bilateral foot drop, has bilateral AFOs but they are uncomfortable and she doesn't like them.  Given these limitations she had hip problems now s/p bilateral ODILON.    She was later dx with metallosis, including leeching of cobalt.  She falls at least q 3 weeks-she thinks she often gets distracted and forgets about her disability at this time.    She now has R arm radicular issues from repeated falls she speculates.    She is now having panic attacks at times too and has a slight hand tremor now as well.    She lives in [place], [place]    Current medications notable for:  Effexor 75 mg XR daily        Past Medical History:     Patient Active Problem List   Diagnosis     Localized osteoarthrosis, ankle and foot     S/P revision of total hip     Positive Gram stain of synovial fluid  obtained by arthrocentesis     At risk for healthcare associated infection     Past Medical History:   Diagnosis Date     Arthritis      At risk for healthcare associated infection 2019     Foot drop, bilateral      IBS (irritable bowel syndrome)      Peripheral neuropathy         Past Surgical History:     Past Surgical History:   Procedure Laterality Date      knee arthroscopy  Left     meniscus repair     ARTHRODESIS ANKLE  2012    Procedure:ARTHRODESIS ANKLE; right ankle medial closing wedge ostetomy intermedually nailing removal of hardware; Surgeon:HUMA GARRETT; Location:[facility]     ARTHROPLASTY REVISION HIP Left 2019    Procedure: Revision left total hip arthroplasty;  Surgeon: More Stephens MD;  Location: [facility]      SECTION       GYN SURGERY      oopherectomy     ORTHOPEDIC SURGERY Left 2009    left hip      right ankle fusion  Right         Social History:     Social History     Tobacco Use     Smoking status: Current Every Day Smoker     Packs/day: 1.00     Types: Cigarettes     Smokeless tobacco: Never Used     Tobacco comment: 1-pack daily    Substance Use Topics     Alcohol use: Yes     Comment: 1 - 4 times per week     Drug use: No        Family History:     Family History   Problem Relation Age of Onset     Multiple Sclerosis Mother      Hypertension Father         Medications:     Current Outpatient Medications   Medication Sig     Cholecalciferol (VITAMIN D3 PO) Take 5,000 Units by mouth every other day      ibuprofen (ADVIL/MOTRIN) 200 MG tablet Take 200-400 mg by mouth every 6 hours as needed for mild pain     MULTIPLE VITAMIN PO Take by mouth daily     venlafaxine (EFFEXOR-XR) 75 MG 24 hr capsule Take 1 capsule by mouth daily     order for DME Equipment being ordered: casey (Patient not taking: Reported on 2018)     No current facility-administered medications for this visit.        Allergies:     Allergies   Allergen Reactions     Sporanox [Itraconazole]  "Anaphylaxis     Amoxicillin Itching     Ceftin Hives     Clindamycin Hcl Itching     Codeine Sulfate Nausea     Morphine Hcl Hives     Keflex [Cephalexin Hcl] Rash        Review of Systems:   As noted above     Physical Exam:   Vitals: BP (!) 163/84   Pulse 89   Ht 1.778 m (5' 10\")   BMI 29.56 kg/m    CV: peripheral pulse appreciated  Lungs: breathing comfortably  Extremities: some chronic R ankle edema      Neuro:   General Appearance: No apparent distress, well-nourished, well-groomed, pleasant     Mental Status: Alert and oriented to person, place, and time. Speech fluent and comprehension intact. No dysarthria.  Cranial Nerves:   II: Visual fields: normal  III: Pupils: 3 mm, equal, round, reactive to light   III,IV,VI: Extraocular Movements: intact   VII: Facial strength: intact without asymmetry  VIII: Hearing: intact grossly  XII: Tongue movement: normal     Motor Exam:   Upper Extremities  Deltoid  Bicep  Tricep  Wrist Extensors  strength Intrinsic Muscles    Right  5  5  5  5 5 5    Left  5  5  5  5 5 5      Lower Extremities  Hip Flexors  Knee Extensors  Knee   Flexors  Dorsi Flexion  Plantar   Flexion    Right  5  5  5  0  0    Left  5  5  5  0 0      L hip flexion limited by pain      No abnormal movements. Tone is normal throughout.    Sensory: diminished to PP and LT below ankle b/l patchily.  Vibration nearly absent at R great toe. Diminished below ankle bilaterally ~5-10 seconds at each ankle, and at L great toe.      Coordination: no dysmetria with finger-to-nose bilaterally    Reflexes: biceps, triceps, brachioradialis, patellar 1+, and ankle jerks absent. Toes are mute bilaterally    Gait: high steppage gait, unsteady.          Data: Pertinent prior to visit   Imagin MRI L Spine    HISTORY: Low back pain and sciatica.       TECHNIQUE: Sagittal T1 and T2, sagittal IR,  and transverse proton   density and T2 weighted pulse sequences.       FINDINGS: Five lumbar vertebrae are assumed. " Vertebral body heights   and sagittal alignment are within normal limits. The conus medullaris   is unremarkable in appearance on the sagittal images.  There is   advanced degenerative disc disease at L4-5 with complete loss of disc   height and disc signal. Early loss of disc signal without loss of disc   height is noted throughout the remainder of the lumbar spine. Small   central Schmorl's nodes are noted throughout the mid and upper lumbar   spine.       T12-L1, L1-2: No disc bulge or focal disc herniation. No central or   foraminal stenosis.       L2-3: Minimal bulging of the disc annulus. No focal disc herniation.   No central or foraminal stenosis.       L3-4: Mild bulging of the disc annulus, with no focal disc herniation.   No central or foraminal stenosis.       L4-5: Lateral endplate spurring and loss of height result in mild to   moderate bilateral foraminal stenosis. No focal disc herniation or   central stenosis.       L5-S1: There is a left-sided intraforaminal disc protrusion or   herniation with moderate left L5 foraminal stenosis and mass effect on   the left L5 nerve root ganglion. Right lateral annular bulging results   in moderate to severe right foraminal stenosis as well. Moderately   advanced apophyseal joint degenerative changes are present, right   greater than left. No central stenosis.       IMPRESSION:   1. Multilevel degenerative disc disease, greatest at L4-5.   2. Left-sided intraforaminal disc protrusion or herniation at L5-S1   with mass effect on the left L5 nerve root ganglion.   3. Additional multilevel foraminal stenosis, greatest on the right at   L5-S1 and next greatest bilaterally at L4-5.   4. Apophyseal joint degenerative arthrosis, greatest on the right at   L5-S1.      Procedures:      Laboratory:  Lab Results   Component Value Date    WBC 6.4 12/04/2018     Lab Results   Component Value Date    RBC 5.10 12/04/2018     Lab Results   Component Value Date    HGB 11.0  01/19/2019     Lab Results   Component Value Date    HCT 47.0 12/04/2018     No components found for: MCT  Lab Results   Component Value Date    MCV 92 12/04/2018     Lab Results   Component Value Date    MCH 30.6 12/04/2018     Lab Results   Component Value Date    MCHC 33.2 12/04/2018     Lab Results   Component Value Date    RDW 12.5 12/04/2018     Lab Results   Component Value Date     12/04/2018     Last Comprehensive Metabolic Panel:  Sodium   Date Value Ref Range Status   05/22/2009 134 133 - 144 mmol/L Final     Potassium   Date Value Ref Range Status   05/22/2009 4.0 3.4 - 5.3 mmol/L Final     Chloride   Date Value Ref Range Status   05/22/2009 106 94 - 109 mmol/L Final     Carbon Dioxide   Date Value Ref Range Status   05/22/2009 27 20 - 32 mmol/L Final     Anion Gap   Date Value Ref Range Status   05/22/2009 2 (L) 6 - 17 mmol/L Final     Glucose   Date Value Ref Range Status   01/18/2019 108 (H) 70 - 99 mg/dL Final     Urea Nitrogen   Date Value Ref Range Status   05/21/2009 16 5 - 24 mg/dL Final     Creatinine   Date Value Ref Range Status   05/21/2009 0.71 0.52 - 1.04 mg/dL Final     Comment:     New IDMS-traceable calibration  beginning 5/1/08     GFR Estimate   Date Value Ref Range Status   05/21/2009 88 >60 mL/min/1.7m2 Final     Calcium   Date Value Ref Range Status   05/21/2009 9.6 8.5 - 10.4 mg/dL Final     No results found for: TSH  No results found for: A1C  No b12  No IFIX             Assessment and Plan:   Assessment:  Ai Boyer is a 61 year old female who presents today for evaluation of neuropathy.  She previously followed with Dr. Verde in the 90s in this regard and had what sounds to be a very thoughtful and thorough work up including CSF analysis, nerve biopsy, DNA testing, trial of IVIG among other things tested or tried.     She is a former  had to wear heels and walk a lot back in those times. She subsequently had several bunion surgeries bilaterally and she  wonders if these caused her foot weakness by proxy of nerve damage.   She had been going about her life after this work up but she worries her neuropathy may be progressing slowly.  She notes some L hip pain, confounded by previous hip surgeries and revisions required.  She also noted some radicular R arm pain, possibly provoked by repeated falls/using her arms to brace her fall at times, it would seem.  Her exam does demonstrate profound distal lower extremity weakness and sensory changes potentially correlative with a profound idiopathic length dependent vs bilaterally focal/traumatic neuropathy but clearly there was concern for more being at play previously as the weakness is quite profound distally. I think we should get new blood work, repeat EMG to determine if anything is active or possibly radicular currently with an understanding of the potential limitations of this test in her.  Previously elevated chromium and cobalt in setting of reported ODILON medical device recall is interesting and of potential but undetermined significance at this time.       Plan:    -Labs  1. Hemoglobin U3p--Xut states was recently normal when checked elsewhere.  2. Immunofixation, serum  3. TSH  4. CBC, CMP  5. Vitamin B12 level   6. Copper, Zinc  7. Recheck previously elevated Chromium, Cobalt  8. Vitamin B1 whole blood, B6  Other work up:  9.EMG/NCS to further work up active neuropathy vs radiculopathy given previous MRI L spine findings above.  Discussed this will be limited in some regards to what we will learn given her profound previous neuropathy but can in the EMG portion help us figure out if anything active is going on in her proximal leg muscles to answer her question and paraspinally potentially too.  10. Discussed the role of further PT for fall prevention, trial of new AFO fitting.  She is not interested in AFOs given how they make her feel and prefers wraps.  If she is interested in either of these in the future I am  happy to provide a script  11. She is interested in a new genetics referral given the amount of time that has passed since this was last sent.  Discussed some of the new invitae testing, but that Renay Marie in genetics would have more information in this regard.  12. Return to clinic in 3 months, virtually given where she lives is OK with me.  Pending EMG findings we can discuss if seeing the neuromuscular department again is in her best interest or if general neurology remains the best fit for her.             Lázaro Jarquin MD   of Neurology   [facility]      The total time of this encounter today amounted to 75 minutes. This time included time spent with the patient, prep work, ordering tests, and performing post visit documentation.

## 2021-12-02 NOTE — PATIENT INSTRUCTIONS
EMG  Blood work  Genetics referral   Keep working on finding AFO substitutes for fall prevention and If you ever want to more in PT in this regard let me know.  See me back in 3 months can be virtually if you prefer.

## 2021-12-02 NOTE — NURSING NOTE
"Ai Boyer's goals for this visit include: consult  She requests these members of her care team be copied on today's visit information:     PCP: Dangelo Koehler    Referring Provider:  No referring provider defined for this encounter.    BP (!) 163/84   Pulse 89   Ht 1.778 m (5' 10\")   BMI 29.56 kg/m      Do you need any medication refills at today's visit? n  "

## 2021-12-03 LAB — PROT PATTERN SERPL IFE-IMP: NORMAL

## 2021-12-05 LAB — PYRIDOXAL PHOS SERPL-SCNC: 181.4 NMOL/L

## 2021-12-06 LAB
COPPER SERPL-MCNC: 119.5 UG/DL
VIT B1 PYROPHOSHATE BLD-SCNC: 203 NMOL/L
ZINC SERPL-MCNC: 65.5 UG/DL

## 2021-12-07 LAB — CR SERPL-MCNC: 6.6 UG/L

## 2021-12-15 DIAGNOSIS — R79.89 HIGH SERUM VITAMIN D: Primary | ICD-10-CM

## 2021-12-23 NOTE — LETTER
12/2/2021         RE: Ai Boyer  [id number]  [place] [facility] [place] [address]        Dear Colleague,    Thank you for referring your patient, Ai Boyer, to the [facility] NEUROLOGY CLINIC [place]. Please see a copy of my visit note below.    [facility]  Section of General Neurology  New Patient Visit      Ai Boyer MRN# [id number]   Age: 61 year old YOB: 1960     Requesting physician: No ref. provider found  Dangelo Koehler A     Reason for Consultation: Neuropathy        History of Presenting Symptoms:   Ai Boyer is a 61 year old female who presents today for evaluation of neuropathy.    She previously followed with Dr. Verde in the 90s.  She is a former  had to wear heels and walk a lot back in those tiems.  Had bilateral bunion surgery several on both sides.    She developed foot symptoms and was worried she had ALS. This was thoroughly worked up including a spinal tap, DNA testing, nerve biopsies all negative, she reports, though this data is quite old and I cannot find it in our charting from the late 1990s.    EMG at that time was quite profound as well. Reportedly.  But all of this testing could not reveal why the neuropathy. Trial of IVIG also not helpful, per the patient.    Later had R ankle fused, osman is was then later taken out.  New one put in in 2006.  Last surgery on R ankle 2012.  She does still have bilateral foot drop, has bilateral AFOs but they are uncomfortable and she doesn't like them.  Given these limitations she had hip problems now s/p bilateral ODILON.    She was later dx with metallosis, including leeching of cobalt.  She falls at least q 3 weeks-she thinks she often gets distracted and forgets about her disability at this time.    She now has R arm radicular issues from repeated falls she speculates.    She is now having panic attacks at times too and has a slight hand tremor now as well.    She lives in  [place]    Current medications notable for:  Effexor 75 mg XR daily        Past Medical History:     Patient Active Problem List   Diagnosis     Localized osteoarthrosis, ankle and foot     S/P revision of total hip     Positive Gram stain of synovial fluid obtained by arthrocentesis     At risk for healthcare associated infection     Past Medical History:   Diagnosis Date     Arthritis      At risk for healthcare associated infection 2019     Foot drop, bilateral      IBS (irritable bowel syndrome)      Peripheral neuropathy         Past Surgical History:     Past Surgical History:   Procedure Laterality Date      knee arthroscopy  Left     meniscus repair     ARTHRODESIS ANKLE  2012    Procedure:ARTHRODESIS ANKLE; right ankle medial closing wedge ostetomy intermedually nailing removal of hardware; Surgeon:HUMA GARRETT; Location:[facility]     ARTHROPLASTY REVISION HIP Left 2019    Procedure: Revision left total hip arthroplasty;  Surgeon: More Stephens MD;  Location: [facility]      SECTION       GYN SURGERY      oopherectomy     ORTHOPEDIC SURGERY Left     left hip      right ankle fusion  Right         Social History:     Social History     Tobacco Use     Smoking status: Current Every Day Smoker     Packs/day: 1.00     Types: Cigarettes     Smokeless tobacco: Never Used     Tobacco comment: 1-pack daily    Substance Use Topics     Alcohol use: Yes     Comment: 1 - 4 times per week     Drug use: No        Family History:     Family History   Problem Relation Age of Onset     Multiple Sclerosis Mother      Hypertension Father         Medications:     Current Outpatient Medications   Medication Sig     Cholecalciferol (VITAMIN D3 PO) Take 5,000 Units by mouth every other day      ibuprofen (ADVIL/MOTRIN) 200 MG tablet Take 200-400 mg by mouth every 6 hours as needed for mild pain     MULTIPLE VITAMIN PO Take by mouth daily     venlafaxine (EFFEXOR-XR) 75 MG 24 hr capsule Take 1  "capsule by mouth daily     order for DME Equipment being ordered: casey (Patient not taking: Reported on 12/4/2018)     No current facility-administered medications for this visit.        Allergies:     Allergies   Allergen Reactions     Sporanox [Itraconazole] Anaphylaxis     Amoxicillin Itching     Ceftin Hives     Clindamycin Hcl Itching     Codeine Sulfate Nausea     Morphine Hcl Hives     Keflex [Cephalexin Hcl] Rash        Review of Systems:   As noted above     Physical Exam:   Vitals: BP (!) 163/84   Pulse 89   Ht 1.778 m (5' 10\")   BMI 29.56 kg/m    CV: peripheral pulse appreciated  Lungs: breathing comfortably  Extremities: some chronic R ankle edema      Neuro:   General Appearance: No apparent distress, well-nourished, well-groomed, pleasant     Mental Status: Alert and oriented to person, place, and time. Speech fluent and comprehension intact. No dysarthria.  Cranial Nerves:   II: Visual fields: normal  III: Pupils: 3 mm, equal, round, reactive to light   III,IV,VI: Extraocular Movements: intact   VII: Facial strength: intact without asymmetry  VIII: Hearing: intact grossly  XII: Tongue movement: normal     Motor Exam:   Upper Extremities  Deltoid  Bicep  Tricep  Wrist Extensors  strength Intrinsic Muscles    Right  5  5  5  5 5 5    Left  5  5  5  5 5 5      Lower Extremities  Hip Flexors  Knee Extensors  Knee   Flexors  Dorsi Flexion  Plantar   Flexion    Right  5  5  5  0  0    Left  5  5  5  0 0      L hip flexion limited by pain      No abnormal movements. Tone is normal throughout.    Sensory: diminished to PP and LT below ankle b/l patchily.  Vibration nearly absent at R great toe. Diminished below ankle bilaterally ~5-10 seconds at each ankle, and at L great toe.      Coordination: no dysmetria with finger-to-nose bilaterally    Reflexes: biceps, triceps, brachioradialis, patellar 1+, and ankle jerks absent. Toes are mute bilaterally    Gait: high steppage gait, unsteady.          " Data: Pertinent prior to visit   Imagin MRI L Spine    HISTORY: Low back pain and sciatica.       TECHNIQUE: Sagittal T1 and T2, sagittal IR,  and transverse proton   density and T2 weighted pulse sequences.       FINDINGS: Five lumbar vertebrae are assumed. Vertebral body heights   and sagittal alignment are within normal limits. The conus medullaris   is unremarkable in appearance on the sagittal images.  There is   advanced degenerative disc disease at L4-5 with complete loss of disc   height and disc signal. Early loss of disc signal without loss of disc   height is noted throughout the remainder of the lumbar spine. Small   central Schmorl's nodes are noted throughout the mid and upper lumbar   spine.       T12-L1, L1-2: No disc bulge or focal disc herniation. No central or   foraminal stenosis.       L2-3: Minimal bulging of the disc annulus. No focal disc herniation.   No central or foraminal stenosis.       L3-4: Mild bulging of the disc annulus, with no focal disc herniation.   No central or foraminal stenosis.       L4-5: Lateral endplate spurring and loss of height result in mild to   moderate bilateral foraminal stenosis. No focal disc herniation or   central stenosis.       L5-S1: There is a left-sided intraforaminal disc protrusion or   herniation with moderate left L5 foraminal stenosis and mass effect on   the left L5 nerve root ganglion. Right lateral annular bulging results   in moderate to severe right foraminal stenosis as well. Moderately   advanced apophyseal joint degenerative changes are present, right   greater than left. No central stenosis.       IMPRESSION:   1. Multilevel degenerative disc disease, greatest at L4-5.   2. Left-sided intraforaminal disc protrusion or herniation at L5-S1   with mass effect on the left L5 nerve root ganglion.   3. Additional multilevel foraminal stenosis, greatest on the right at   L5-S1 and next greatest bilaterally at L4-5.   4. Apophyseal joint  degenerative arthrosis, greatest on the right at   L5-S1.      Procedures:      Laboratory:  Lab Results   Component Value Date    WBC 6.4 12/04/2018     Lab Results   Component Value Date    RBC 5.10 12/04/2018     Lab Results   Component Value Date    HGB 11.0 01/19/2019     Lab Results   Component Value Date    HCT 47.0 12/04/2018     No components found for: MCT  Lab Results   Component Value Date    MCV 92 12/04/2018     Lab Results   Component Value Date    MCH 30.6 12/04/2018     Lab Results   Component Value Date    MCHC 33.2 12/04/2018     Lab Results   Component Value Date    RDW 12.5 12/04/2018     Lab Results   Component Value Date     12/04/2018     Last Comprehensive Metabolic Panel:  Sodium   Date Value Ref Range Status   05/22/2009 134 133 - 144 mmol/L Final     Potassium   Date Value Ref Range Status   05/22/2009 4.0 3.4 - 5.3 mmol/L Final     Chloride   Date Value Ref Range Status   05/22/2009 106 94 - 109 mmol/L Final     Carbon Dioxide   Date Value Ref Range Status   05/22/2009 27 20 - 32 mmol/L Final     Anion Gap   Date Value Ref Range Status   05/22/2009 2 (L) 6 - 17 mmol/L Final     Glucose   Date Value Ref Range Status   01/18/2019 108 (H) 70 - 99 mg/dL Final     Urea Nitrogen   Date Value Ref Range Status   05/21/2009 16 5 - 24 mg/dL Final     Creatinine   Date Value Ref Range Status   05/21/2009 0.71 0.52 - 1.04 mg/dL Final     Comment:     New IDMS-traceable calibration  beginning 5/1/08     GFR Estimate   Date Value Ref Range Status   05/21/2009 88 >60 mL/min/1.7m2 Final     Calcium   Date Value Ref Range Status   05/21/2009 9.6 8.5 - 10.4 mg/dL Final     No results found for: TSH  No results found for: A1C  No b12  No IFIX             Assessment and Plan:   Assessment:  Ai Boyer is a 61 year old female who presents today for evaluation of neuropathy.  She previously followed with Dr. Verde in the 90s in this regard and had what sounds to be a very thoughtful and thorough  work up including CSF analysis, nerve biopsy, DNA testing, trial of IVIG among other things tested or tried.     She is a former  had to wear heels and walk a lot back in those times. She subsequently had several bunion surgeries bilaterally and she wonders if these caused her foot weakness by proxy of nerve damage.   She had been going about her life after this work up but she worries her neuropathy may be progressing slowly.  She notes some L hip pain, confounded by previous hip surgeries and revisions required.  She also noted some radicular R arm pain, possibly provoked by repeated falls/using her arms to brace her fall at times, it would seem.  Her exam does demonstrate profound distal lower extremity weakness and sensory changes potentially correlative with a profound idiopathic length dependent vs bilaterally focal/traumatic neuropathy but clearly there was concern for more being at play previously as the weakness is quite profound distally. I think we should get new blood work, repeat EMG to determine if anything is active or possibly radicular currently with an understanding of the potential limitations of this test in her.  Previously elevated chromium and cobalt in setting of reported ODILON medical device recall is interesting and of potential but undetermined significance at this time.       Plan:    -Labs  1. Hemoglobin V7x--Oeq states was recently normal when checked elsewhere.  2. Immunofixation, serum  3. TSH  4. CBC, CMP  5. Vitamin B12 level   6. Copper, Zinc  7. Recheck previously elevated Chromium, Cobalt  8. Vitamin B1 whole blood, B6  Other work up:  9.EMG/NCS to further work up active neuropathy vs radiculopathy given previous MRI L spine findings above.  Discussed this will be limited in some regards to what we will learn given her profound previous neuropathy but can in the EMG portion help us figure out if anything active is going on in her proximal leg muscles to answer her  question and paraspinally potentially too.  10. Discussed the role of further PT for fall prevention, trial of new AFO fitting.  She is not interested in AFOs given how they make her feel and prefers wraps.  If she is interested in either of these in the future I am happy to provide a script  11. She is interested in a new genetics referral given the amount of time that has passed since this was last sent.  Discussed some of the new invitae testing, but that Renay Marie in genetics would have more information in this regard.  12. Return to clinic in 3 months, virtually given where she lives is OK with me.  Pending EMG findings we can discuss if seeing the neuromuscular department again is in her best interest or if general neurology remains the best fit for her.             Lázaro Jarquin MD   of Neurology   [facility]      The total time of this encounter today amounted to 75 minutes. This time included time spent with the patient, prep work, ordering tests, and performing post visit documentation.        Again, thank you for allowing me to participate in the care of your patient.        Sincerely,        Brett Jarquin MD     194

## 2022-01-04 ENCOUNTER — LAB (OUTPATIENT)
Dept: LAB | Facility: CLINIC | Age: 62
End: 2022-01-04
Payer: COMMERCIAL

## 2022-01-04 ENCOUNTER — VIRTUAL VISIT (OUTPATIENT)
Dept: NEUROLOGY | Facility: CLINIC | Age: 62
End: 2022-01-04
Attending: STUDENT IN AN ORGANIZED HEALTH CARE EDUCATION/TRAINING PROGRAM
Payer: COMMERCIAL

## 2022-01-04 DIAGNOSIS — R79.89 HIGH SERUM VITAMIN D: ICD-10-CM

## 2022-01-04 DIAGNOSIS — M21.371 FOOT DROP, BILATERAL: ICD-10-CM

## 2022-01-04 DIAGNOSIS — G60.9 IDIOPATHIC NEUROPATHY: ICD-10-CM

## 2022-01-04 DIAGNOSIS — M21.372 FOOT DROP, BILATERAL: ICD-10-CM

## 2022-01-04 PROCEDURE — 83018 HEAVY METAL QUAN EACH NES: CPT | Mod: 90

## 2022-01-04 PROCEDURE — 82306 VITAMIN D 25 HYDROXY: CPT

## 2022-01-04 PROCEDURE — 99000 SPECIMEN HANDLING OFFICE-LAB: CPT

## 2022-01-04 PROCEDURE — 36415 COLL VENOUS BLD VENIPUNCTURE: CPT

## 2022-01-04 PROCEDURE — 99207 PR NO CHARGE LOS: CPT | Performed by: GENETIC COUNSELOR, MS

## 2022-01-04 PROCEDURE — 96040 PR GENETIC COUNSELING, EACH 30 MIN: CPT | Mod: GT | Performed by: GENETIC COUNSELOR, MS

## 2022-01-04 NOTE — PROGRESS NOTES
Ai Boyer was seen for a genetic counseling appointment at the request of Dr. Jarquin today given her diagnosis of neuropathy.     Pertinent Medical History: Ai is a 61 year old female with a history of neuropathy. She was previously seen by Dr Verde in the late 1990s related to this diagnosis but has not been followed for neuropathy for many years. Ai reports a history of multiple surgeries on her right ankle and bilateral hip replacement. It is unclear whether the neuropathy in her feet occurred due to her surgical history or other unidentified causes. Ai reports that when she saw Dr Verde her name was Ai Washburn and that at this time she had multiple EMGs and some genetic testing. These records were not available for review at today's appointment. See Dr. Verde's note for additional details.     Family History: A three generation pedigree was obtained today and scanned into the EMR. This family history is by patient report only and has not been verified with medical records except where noted. The following information is significant:     Ai has 2 fraternal twin daughters (age 30).  One of her daughters has a history of removal of half of her thyroid due to a tumor and an ovarian cyst.  She also has a history of 1 miscarriage which occurred at 6 weeks gestation.  Ai's other daughter has a history of anxiety and hair thinning that may be due to this anxiety.    Ai has 1 brother (age 62) is a history of heart problems that are most likely due to an infection.  These heart problems include atrial and ventricular fibrillation requiring a pacemaker and a defibrillator.  He also has a history of a stroke and left foot drop that began after his stroke.  He has 2 healthy sons (ages 33 and 29) and one healthy daughter (age 31).  His son (age 29) has 1 healthy son (age 2) and his wife is currently pregnant.  Ai has 1 sister (age 53) who has a history of multiple bunion surgeries.  She has  "tingling in her feet that are thought to be due to the surgeries.  She does not see a doctor regularly.  She has 1 healthy daughter (age 21) and one healthy son (age 19).    Ai's father  at age 74 and had a history of heart problems including multiple valve replacements.  He also had a history of a stroke and high blood pressure.  His siblings, nieces and nephews have no history of neuropathy or nerve damage.  Ai's paternal grandfather  due to suicide and had a history of Gaines's disease.  Ai's paternal grandmother  at age 37 due to asthma.  Ai has 1 uncle who is granddaughter has a history of atypical facial features, intellectual disability, above average height and delayed development.  The family suspects that she is \"vaccine injured\".  Paternal ancestry is English, Sudanese, Romanian and [place] Islander.    Ai's mother  at age 69 and had a history of multiple sclerosis that was diagnosed after the birth of her last daughter and history of an ovarian cyst.  Her siblings, parents and nieces and nephews have no history of neuropathy or symptoms of neuropathy.  Maternal ancestry is Latvian and Mongolian.    Consanguinity was denied.    Discussion: We reviewed possible causes of neuropathy as well as options for genetic testing.     Neuropathy is a common neurologic disease affecting 3 million individuals every year in the [place]. Neuropathy may be caused by a particular single gene change (mutation), may be caused by environmental factors such as chemical exposure and certain types of medication or may be multifactorial meaning they are due to a combination of environmental and genetic factors.  Neuropathy may present as an isolated finding (non-syndromic) or as a part of a broader phenotype (syndromic). There are several neurologic and neuromuscular disorders that include neuropathy as a prominent feature. Inherited neuropathies can be inherited in an autosomal dominant inheritance " pattern, meaning only one genetic mutation in a gene causes disease, an autosomal recessive inheritance pattern, meaning two mutations in a gene cause disease, or an X-linked inheritance pattern, meaning a mutation in a gene on the X-chromosome causes disease.     Our genes are sequences of letters that provide instructions that help our body grow, develop and function. Sometimes a change occurs in a gene that causes our body to be unable to read these instructions. This can result in a genetic condition. We know that there are many different types of genetic changes that are associated with neuropathy. These genetic changes cause a variety of different genetic conditions. Due to significant overlap in the clinical features of these conditions, it would be irresponsible to test for only one of these disorders. For this reason, a panel of genes related to syndromic and non-syndromic hereditary neuropathies is recommended. This test looks at many genes related to neuropathies to determine if any variants or changes are present.    There are three possible results for this testing:    o Positive: A positive result indicates that a genetic variant has been identified that explains the cause of Ai s symptoms. A positive result may provide information on prognosis and other symptoms related to the genetic change. It may also help guide medical management for Ai and may provide information to other family members regarding their risk.   o Negative: A negative result indicates that a disease causing genetic variant was not identified  o Variant of uncertain significance (VUS): A VUS is an uncertain result that indicates a genetic change was identified, but it is currently unknown if that change is associated with a genetic disorder.    Identifying a possible underlying genetic etiology of an individual's neuropathy can help confirm a diagnosis of a specific genetic syndrome or type of neuropathy, provide information  about prognosis, and provide additional information for familial recurrence risk and family planning.      Genetic testing options were discussed. A comprehensive neuropathies panel is available at [facility] through insurance or through their sponsored genetic testing program.  Risks benefits and limitations of these tests were reviewed. Ai decided to submit for insurance authorization and complete this testing pending insurance approval.      Plan:  1.   Comprehensive neuropathies panel at [facility] pending insurance approval  2. Return pending results of above testing  3. Contact information was provided should any questions arise in the future.     Renay Marie MS [facility]  Genetic Counselor  Division of Genetics and Metabolism  (p) [phone]  (f) [phone]     Total time spent in consultation with the family was approximately 58 minutes    Cc: No Letter

## 2022-01-04 NOTE — PROGRESS NOTES
Ai is a 61 year old who is being evaluated via a billable video visit.      How would you like to obtain your AVS? MyChart  If the video visit is dropped, the invitation should be resent by: Send to e-mail at: [email or web address]  Will anyone else be joining your video visit? No        Video-Visit Details    Type of service:  Video Visit    Originating Location (pt. Location): Home    Distant Location (provider location):  [facility] NEUROLOGY CLINIC [place]     Platform used for Video Visit: [employer]

## 2022-01-05 LAB
DEPRECATED CALCIDIOL+CALCIFEROL SERPL-MC: <71 UG/L (ref 20–75)
VITAMIN D2 SERPL-MCNC: <5 UG/L
VITAMIN D3 SERPL-MCNC: 66 UG/L

## 2022-01-08 LAB — COBALT SERPL-MCNC: 4.6 UG/L

## 2022-01-26 LAB
PERFORMING LABORATORY: NORMAL
SPECIMEN STATUS: NORMAL
TEST NAME: NORMAL

## 2022-01-27 ENCOUNTER — TELEPHONE (OUTPATIENT)
Dept: NEUROLOGY | Facility: CLINIC | Age: 62
End: 2022-01-27

## 2022-01-27 NOTE — TELEPHONE ENCOUNTER
Contacted Ai to review the results of her genetic testing. Left a non detailed VM.    When she returns my call I will explain that the results of her neuropathy genetic testing panel were negative or normal. This means that this testing was unable to identify a genetic cause for her neuropathy symptoms. A copy of this report will be sent to Ai in the mail.    Reviewed this information above. Ai did not have additional questions at this time but asked that I make sure that Dr Verde has this information.    Renay Marie [facility]  Genetic Counselor  Division of Genetics and Metabolism  (p) [phone]  (f) [phone]

## 2022-01-31 ENCOUNTER — OFFICE VISIT (OUTPATIENT)
Dept: NEUROLOGY | Facility: CLINIC | Age: 62
End: 2022-01-31
Payer: COMMERCIAL

## 2022-01-31 DIAGNOSIS — M21.372 FOOT DROP, BILATERAL: ICD-10-CM

## 2022-01-31 DIAGNOSIS — G60.9 IDIOPATHIC NEUROPATHY: ICD-10-CM

## 2022-01-31 DIAGNOSIS — M21.371 FOOT DROP, BILATERAL: ICD-10-CM

## 2022-01-31 PROCEDURE — 95912 NRV CNDJ TEST 11-12 STUDIES: CPT | Performed by: PSYCHIATRY & NEUROLOGY

## 2022-01-31 PROCEDURE — 95886 MUSC TEST DONE W/N TEST COMP: CPT | Performed by: PSYCHIATRY & NEUROLOGY

## 2022-01-31 NOTE — LETTER
1/31/2022         RE: Ai Boyer  [id number]  [facility] [address]        Dear Colleague,    Thank you for referring your patient, Ai Boyer, to the [facility] [place]. Please see a copy of my visit note below.    [facility]   EMG Laboratory      Nerve Conduction & EMG Report          Patient:       Ai Boyer  Patient ID:    [id number]  Gender:        Female  YOB: 1960  Age:           61 Years 9 Months      History and Examination:  Ai Boyer is a 61 year old woman with a past history of polyneuropathy who is referred for follow up electrodiagnostic evaluation. In addition she reports pain in the right upper and left lower limbs, and evaluation of possible radiculopathy in those limbs is requested as well. Examination is notable for near-complete inability to dorsiflex or plantarflex at the left ankle and a surgical fusion of the right ankle.     Techniques:  Motor conduction studies were done with surface recording electrodes. Sensory conduction studies were performed with surface electrodes, unless indicated otherwise by (n), designating the use of subdermal recording electrodes. Temperature was monitored and recorded throughout the study. Upper extremities were maintained at a temperature of 32 degrees Centigrade or higher.  Lower extremities were maintained at a temperature of 31 degrees Centigrade or higher. EMG was done with a concentric needle electrode.     Results:  Bilateral sural and left superficial peroneal sensory nerve action potentials were absent. Right median and ulnar sensory conduction studies demonstrated mild and moderate attenuation of amplitude, respectively, and moderately severe slowing of conduction. A right radial sensory conduction study was normal. Bilateral peroneal and left tibial compound muscle action potentials were absent. Right median motor conduction studies demonstrated mild to moderate  prolongation of median distal latencies and mild slowing of median conduction in the forearm. A right ulnar motor conduction study demonstrated moderately severe conduction slowing across the elbow. Electromyography of the right upper and left lower limbs demonstrated decreased insertional activity in the left tibialis anterior muscle and no abnormal spontaneous activity. Voluntary activation demonstrated no motor units in the tibialis anterior and markedly reduced recruitment in the medial gastrocnemius.    Interpretation:  This is an abnormal study, demonstrating electrophysiologic evidence of the followin. Severe, length-dependent sensorimotor axonal polyneuropathy.  2. Moderate right median neuropathy at the wrist.  3. Moderate right ulnar neuropathy at the elbow.  4. Right ulnar neuropathy at the wrist is not excluded.  5. No evidence of left lumbosacral radiculopathy.  6. No evidence of right cervical radiculopathy.      Adeel Verde MD        Sensory NCS      Nerve / Sites Rec. Site Onset Peak NP Amp Ref. PP Amp Dist Bull Ref. Temp     ms ms  V  V  V cm m/s m/s  C   R MEDIAN - Dig II Anti      Wrist Dig II 3.91 5.47 7.7 10.0 10.5 14 35.8 48.0 32   R ULNAR - Dig V Anti      Wrist Dig V 3.23 4.27 3.3 8.0 2.9 12.5 38.7 48.0 32.7   R RADIAL - Snuff      Forearm Snuff 1.98 2.66 15.0 15.0 26.7 10 50.5 48.0 32.9   L SURAL - Lat Mall 60      Calf Ankle NR NR NR 5.0 NR 14 NR 38.0 25.2      Calf Ankle NR NR NR 5.0 NR 14 NR  25.2   R SURAL - Lat Mall 60      Calf Ankle NR NR NR 5.0 NR 14 NR 38.0 25.9   L SUP PERONEAL      Lat Leg John NR NR NR  NR 12.5 NR 38.0 25.9       Motor NCS      Nerve / Sites Rec. Site Lat Ref. Amp Ref. Rel Amp Dist Bull Ref. Dur. Area Temp.     ms ms mV mV % cm m/s m/s ms %  C   R MEDIAN - APB      Wrist APB 5.47 4.40 5.5 5.0 100 8   7.45 100 32.7      Elbow APB 10.57  5.7  104 23 45.1 48.0 9.22 96.9 32.7   R ULNAR - ADM      Wrist ADM 3.23 3.50 10.7 5.0 100 8   6.88 100 32.5      B.Elbow ADM  6.98  8.9  83.2 20 53.3 48.0 6.93 82.4 32.2      A.Elbow ADM 10.10  7.6  71.5 10 32.0 48.0 7.34 75.8 32.1   L DEEP PERONEAL - EDB 60      Ankle EDB NR 6.00 NR 2.0 NR 8   NR NR 25   L TIBIAL - AH      Ankle AH NR 6.00 NR 4.0 NR 8   NR NR 24.8   R MEDIAN - II Lumb      Median II Lumb 5.42  0.5  100 10   9.43 100 31.2      Ulnar Palm Int 4.01  2.7  540 10   7.40 293 31.2   L PERONEAL - Tib Ant      Fib Head Tib Ant NR  NR  NR    NR NR 25.9   R PERONEAL - Tib Ant      Fib Head Tib Ant NR  NR  NR    NR NR 24.7       Needle EMG (W)      L GASTROCN (MED): single distant unit      EMG Summary Table     Spontaneous MUAP Recruitment    IA Fib PSW Fasc H.F. Amp Dur. PPP Pattern   R. PRON TERES N None None None None N N N N   R. BICEPS N None None None None N N N N   R. TRICEPS N None None None None N N N N   R. EXT DIG COMM N None None None None N N N N   R. FIRST D INTEROSS N None None None None N N N N   R. C6 PSP N None None None None N N N N   L. TIB ANTERIOR Decreased None None None None N N N No Units   L. GASTROCN (MED) N None None None None N N N Discrete   L. VAST LATERALIS N None None None None N N N N   L. ADD LONGUS N None None None None N N N N   L. ILIOPSOAS N None None None None N N N N   L. BIC FEM (S HEAD) N None None None None N N N N   L. GLUTEUS MED N None None None None N N N N   L. L4 PSP N None None None None N N N N                                        Again, thank you for allowing me to participate in the care of your patient.        Sincerely,        Adeel Verde MD

## 2022-02-01 NOTE — PROGRESS NOTES
[facility]   EMG Laboratory      Nerve Conduction & EMG Report          Patient:       Ai Boyer  Patient ID:    [id number]  Gender:        Female  YOB: 1960  Age:           61 Years 9 Months      History and Examination:  Ai Boyer is a 61 year old woman with a past history of polyneuropathy who is referred for follow up electrodiagnostic evaluation. In addition she reports pain in the right upper and left lower limbs, and evaluation of possible radiculopathy in those limbs is requested as well. Examination is notable for near-complete inability to dorsiflex or plantarflex at the left ankle and a surgical fusion of the right ankle.     Techniques:  Motor conduction studies were done with surface recording electrodes. Sensory conduction studies were performed with surface electrodes, unless indicated otherwise by (n), designating the use of subdermal recording electrodes. Temperature was monitored and recorded throughout the study. Upper extremities were maintained at a temperature of 32 degrees Centigrade or higher.  Lower extremities were maintained at a temperature of 31 degrees Centigrade or higher. EMG was done with a concentric needle electrode.     Results:  Bilateral sural and left superficial peroneal sensory nerve action potentials were absent. Right median and ulnar sensory conduction studies demonstrated mild and moderate attenuation of amplitude, respectively, and moderately severe slowing of conduction. A right radial sensory conduction study was normal. Bilateral peroneal and left tibial compound muscle action potentials were absent. Right median motor conduction studies demonstrated mild to moderate prolongation of median distal latencies and mild slowing of median conduction in the forearm. A right ulnar motor conduction study demonstrated moderately severe conduction slowing across the elbow. Electromyography of the right upper and left lower limbs demonstrated  decreased insertional activity in the left tibialis anterior muscle and no abnormal spontaneous activity. Voluntary activation demonstrated no motor units in the tibialis anterior and markedly reduced recruitment in the medial gastrocnemius.    Interpretation:  This is an abnormal study, demonstrating electrophysiologic evidence of the followin. Severe, length-dependent sensorimotor axonal polyneuropathy.  2. Moderate right median neuropathy at the wrist.  3. Moderate right ulnar neuropathy at the elbow.  4. Right ulnar neuropathy at the wrist is not excluded.  5. No evidence of left lumbosacral radiculopathy.  6. No evidence of right cervical radiculopathy.      Adeel Verde MD        Sensory NCS      Nerve / Sites Rec. Site Onset Peak NP Amp Ref. PP Amp Dist Bull Ref. Temp     ms ms  V  V  V cm m/s m/s  C   R MEDIAN - Dig II Anti      Wrist Dig II 3.91 5.47 7.7 10.0 10.5 14 35.8 48.0 32   R ULNAR - Dig V Anti      Wrist Dig V 3.23 4.27 3.3 8.0 2.9 12.5 38.7 48.0 32.7   R RADIAL - Snuff      Forearm Snuff 1.98 2.66 15.0 15.0 26.7 10 50.5 48.0 32.9   L SURAL - Lat Mall 60      Calf Ankle NR NR NR 5.0 NR 14 NR 38.0 25.2      Calf Ankle NR NR NR 5.0 NR 14 NR  25.2   R SURAL - Lat Mall 60      Calf Ankle NR NR NR 5.0 NR 14 NR 38.0 25.9   L SUP PERONEAL      Lat Leg John NR NR NR  NR 12.5 NR 38.0 25.9       Motor NCS      Nerve / Sites Rec. Site Lat Ref. Amp Ref. Rel Amp Dist Bull Ref. Dur. Area Temp.     ms ms mV mV % cm m/s m/s ms %  C   R MEDIAN - APB      Wrist APB 5.47 4.40 5.5 5.0 100 8   7.45 100 32.7      Elbow APB 10.57  5.7  104 23 45.1 48.0 9.22 96.9 32.7   R ULNAR - ADM      Wrist ADM 3.23 3.50 10.7 5.0 100 8   6.88 100 32.5      B.Elbow ADM 6.98  8.9  83.2 20 53.3 48.0 6.93 82.4 32.2      A.Elbow ADM 10.10  7.6  71.5 10 32.0 48.0 7.34 75.8 32.1   L DEEP PERONEAL - EDB 60      Ankle EDB NR 6.00 NR 2.0 NR 8   NR NR 25   L TIBIAL - AH      Ankle AH NR 6.00 NR 4.0 NR 8   NR NR 24.8   R MEDIAN - II Lumb       Median II Lumb 5.42  0.5  100 10   9.43 100 31.2      Ulnar Palm Int 4.01  2.7  540 10   7.40 293 31.2   L PERONEAL - Tib Ant      Fib Head Tib Ant NR  NR  NR    NR NR 25.9   R PERONEAL - Tib Ant      Fib Head Tib Ant NR  NR  NR    NR NR 24.7       Needle EMG (W)      L GASTROCN (MED): single distant unit      EMG Summary Table     Spontaneous MUAP Recruitment    IA Fib PSW Fasc H.F. Amp Dur. PPP Pattern   R. PRON TERES N None None None None N N N N   R. BICEPS N None None None None N N N N   R. TRICEPS N None None None None N N N N   R. EXT DIG COMM N None None None None N N N N   R. FIRST D INTEROSS N None None None None N N N N   R. C6 PSP N None None None None N N N N   L. TIB ANTERIOR Decreased None None None None N N N No Units   L. GASTROCN (MED) N None None None None N N N Discrete   L. VAST LATERALIS N None None None None N N N N   L. ADD LONGUS N None None None None N N N N   L. ILIOPSOAS N None None None None N N N N   L. BIC FEM (S HEAD) N None None None None N N N N   L. GLUTEUS MED N None None None None N N N N   L. L4 PSP N None None None None N N N N

## 2022-02-03 ENCOUNTER — TELEPHONE (OUTPATIENT)
Dept: NEUROLOGY | Facility: CLINIC | Age: 62
End: 2022-02-03

## 2022-02-03 NOTE — TELEPHONE ENCOUNTER
I called patient regarding cancelling her appt [facility] Dr. Jarquin and scheduling Consult with Dr. Verde.for End of May. She decided to keep Follow-up with [facility] since [facility] is booking out so far.    Yu Bowden LPN

## 2022-02-03 NOTE — TELEPHONE ENCOUNTER
Called Ai and let her know that Dr Verde's team is working on getting her previous EMG from our paper records. We have not obtained a copy of these records yet.    Ai had questions about her EMG/NCS results. I explained that her NCS study showed length dependent axonal sensorimotor poly neuropathy and I explained what that means in more basic terms. I told her that I can't comment on any other results as I am only able to read Dr Verde's interpretation of the results and not the actual test.    Ai had additional questions about the hip flexor results from the study and whether or not she should do surgery to lengthen it. She wants the EMG/NCS sent to Dr Stephens at [facility] orthopedics.    She also has questions about results from the portion of this study on her neck.     Patient wants to see Dr Verde and not Dr Jarquin and states that Dr Jarquin recommended she see Dr Verde. Her next appointment is with Dr Jarquin. She would like to cancel this and reschedule with Dr Verde.    This note will be shared with Dr Verde and our nurse coordinator for follow up.    Renay Marie MS [facility]  Genetic Counselor  Division of Genetics and Metabolism  (p) [phone]  (f) [phone]

## 2022-02-04 ENCOUNTER — TELEPHONE (OUTPATIENT)
Dept: NEUROLOGY | Facility: CLINIC | Age: 62
End: 2022-02-04
Payer: COMMERCIAL

## 2022-02-04 NOTE — TELEPHONE ENCOUNTER
Called to discuss EMG results. Does show evidence of a severe length-dependent sensorimotor axonal polyneuropathy which does align with her exam findings. The R ulnar and R median neuropathies are likely what are commonly referred to as carpal and cubital tunnel.  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  I would like to compare to the previous EMG if possible.  We discussed that previously CSF analysis and trial of IVIG were not helpful.  I do suspect that repeating these would remain lower yield.  Will see if previous EMG can be found.  No clear active changes (fibs/sharps) noted on needle portion of EMG which is reassuring.

## 2022-02-12 ENCOUNTER — HEALTH MAINTENANCE LETTER (OUTPATIENT)
Age: 62
End: 2022-02-12

## 2022-03-08 ENCOUNTER — TELEPHONE (OUTPATIENT)
Dept: NEUROLOGY | Facility: CLINIC | Age: 62
End: 2022-03-08

## 2022-03-08 NOTE — TELEPHONE ENCOUNTER
Contacted Ai in response to a voicemail that she left me with questions about the bill for her genetic testing.  Left nondetailed voicemail.    When Ai returns my call I will explain that insurance did deny coverage for her genetic testing.  However, the lab is working to appeal this decision.  If they are able to convince insurance to cover the cost of the testing then Ai will owe the previously agreed upon price and that price will go towards her deductible.  If insurance ultimately denies coverage for this testing, the cost of the testing will be $100.  This was confirmed in an email (see below).    Spoke with Ai and reviewed this information. Ai is welcome to contact me if she has additional concerns when she receives the bill.    Renay Marie MS [facility]  Genetic Counselor  Division of Genetics and Metabolism  p) [phone]  (f) [phone]      email sent from [employer] on 2/18/22:    Keith Niño,     I sent a note to our billing team regarding this claim denial. Any additional information you can submit would be helpful for an appeal, but regardless we would still try to appeal with Medical Records. If this is ultimately denied out of pocket would be $100.     I hope that helps, please let me know if you have any other questions.   I hope you have a great day, thank you!       Kathie Albarran     CNP Cardiology,Neurology,and Pediatrics Genetics [place]    Mobile: [phone]    [email or web address]    [email or web address]

## 2022-03-21 ENCOUNTER — VIRTUAL VISIT (OUTPATIENT)
Dept: NEUROLOGY | Facility: CLINIC | Age: 62
End: 2022-03-21
Payer: COMMERCIAL

## 2022-03-21 DIAGNOSIS — G60.9 IDIOPATHIC NEUROPATHY: ICD-10-CM

## 2022-03-21 DIAGNOSIS — R20.9 SENSATION OF COLD IN LEG: ICD-10-CM

## 2022-03-21 DIAGNOSIS — G56.11 RIGHT MEDIAN NERVE NEUROPATHY: ICD-10-CM

## 2022-03-21 DIAGNOSIS — M21.371 FOOT DROP, BILATERAL: ICD-10-CM

## 2022-03-21 DIAGNOSIS — M21.372 FOOT DROP, BILATERAL: ICD-10-CM

## 2022-03-21 DIAGNOSIS — R20.0 BILATERAL LEG NUMBNESS: Primary | ICD-10-CM

## 2022-03-21 DIAGNOSIS — G56.21 ULNAR NEUROPATHY AT ELBOW, RIGHT: ICD-10-CM

## 2022-03-21 PROCEDURE — 99214 OFFICE O/P EST MOD 30 MIN: CPT | Mod: 95 | Performed by: STUDENT IN AN ORGANIZED HEALTH CARE EDUCATION/TRAINING PROGRAM

## 2022-03-21 RX ORDER — HYDROXYZINE HYDROCHLORIDE 25 MG/1
1 TABLET, FILM COATED ORAL DAILY PRN
COMMUNITY
Start: 2021-09-08

## 2022-03-21 NOTE — PATIENT INSTRUCTIONS
Continue brace of elbow, wrist at night for now of right arm  We will see if we can track down that data from the 1990s and see if Dr. Verde has anything additional to add to your work up.    I will call you with ultrasound data  Reach out to me with any questions.

## 2022-03-21 NOTE — LETTER
3/21/2022         RE: Ai Boyer  [address]  [facility] [address]        Dear Colleague,    Thank you for referring your patient, Ai Boyer, to the [facility] NEUROLOG[facility] [place]. Please see a copy of my visit note below.    [facility]  Section of General Neurology  Return Patient Virtual Visit    Ai Boyer MRN# [id number]   Age: 61 year old YOB: 1960     Brief history of symptoms: The patient was initially seen in neurologic consultation on 12/2/21 for evaluation of long standing neuropathy. Please see the comprehensive neurologic consultation note from that date in the Epic records for details.         Interval history:   Subsequent invitae testing was normal/no genetic causes were found.EMG reviewed as below. I did show: Severe, length-dependent sensorimotor axonal polyneuropathy as well as Moderate right median neuropathy at the wrist, Moderate right ulnar neuropathy at the elbow  She has an upcoming appointment with Dr. Verde in neuromuscular department.    Lab work reviewed, heavy metals are elevated but trending down.      There is some data on cobalt toxicity being hard on nerves but this is also usually correlated with hearing, vision changes as well to my review of a few studies so it is unclear what relationship this has to her presentation.  She suspects her serial surgeries of the foot led to nerve damage.      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    She did trip and fall, hurt R shoulder since we last spoke.    Pain is present on outside of elbow at times.    Radiating pain improved.  No wrist pain any longer.  She does think the splints are help.  She notes she can still experience pain when she steps on something e.g.  Legs get very  cold at times, she wonders about her circulation.    She saw her orthopaedic surgeon who did her hip subsequently. She tried an injection into her hip flexor, she then was sent to an ankle subspecialist. She is going to have this ankle surgery to even them out so it does not affect her hips.        Past Medical History:     Patient Active Problem List   Diagnosis     Localized osteoarthrosis, ankle and foot     S/P revision of total hip     Positive Gram stain of synovial fluid obtained by arthrocentesis     At risk for healthcare associated infection     Past Medical History:   Diagnosis Date     Arthritis      At risk for healthcare associated infection 2019     Foot drop, bilateral      IBS (irritable bowel syndrome)      Peripheral neuropathy         Past Surgical History:     Past Surgical History:   Procedure Laterality Date      knee arthroscopy  Left     meniscus repair     ARTHRODESIS ANKLE  2012    Procedure:ARTHRODESIS ANKLE; right ankle medial closing wedge ostetomy intermedually nailing removal of hardware; Surgeon:HUMA GARRETT; Location: [facility]     ARTHROPLASTY REVISION HIP Left 2019    Procedure: Revision left total hip arthroplasty;  Surgeon: More Stephens MD;  Location: [facility] OR      SECTION       GYN SURGERY      oopherectomy     ORTHOPEDIC SURGERY Left 2009    left hip      right ankle fusion  Right         Social History:     Social History     Tobacco Use     Smoking status: Current Every Day Smoker     Packs/day: 1.00     Types: Cigarettes     Smokeless tobacco: Never Used     Tobacco comment: 1-pack daily    Substance Use Topics     Alcohol use: Yes     Comment: 1 - 4 times per week     Drug use: No        Family History:     Family History   Problem Relation Age of Onset     Multiple Sclerosis Mother      Hypertension Father         Medications:     Current Outpatient Medications   Medication Sig     Cholecalciferol (VITAMIN D3 PO) Take 5,000 Units by mouth every  other day      ibuprofen (ADVIL/MOTRIN) 200 MG tablet Take 200-400 mg by mouth every 6 hours as needed for mild pain     MULTIPLE VITAMIN PO Take by mouth daily     order for DME Equipment being ordered: casey (Patient not taking: Reported on 2018)     venlafaxine (EFFEXOR-XR) 75 MG 24 hr capsule Take 1 capsule by mouth daily     No current facility-administered medications for this visit.        Allergies:     Allergies   Allergen Reactions     Sporanox [Itraconazole] Anaphylaxis     Amoxicillin Itching     Ceftin Hives     Clindamycin Hcl Itching     Codeine Sulfate Nausea     Morphine Hcl Hives     Keflex [Cephalexin Hcl] Rash          Physical Exam:   General: Seated comfortably in no acute distress.  HEENT: Neck with normal range of motion as can be assessed.   Skin: No rashes  Neurologic:     Mental Status: Fully alert, attentive and oriented. Speech clear and fluent, no paraphasic errors.     Cranial Nerves: Facial movements symmetric. Hearing not formally tested but intact to conversation.  No dysarthria.     Motor: No tremors or other abnormal movements observed.      Sensory:Not able to be tested virtually           Data: Pertinent prior to visit   Imaging:  Imagin MRI L Spine     HISTORY: Low back pain and sciatica.       TECHNIQUE: Sagittal T1 and T2, sagittal IR,  and transverse proton   density and T2 weighted pulse sequences.       FINDINGS: Five lumbar vertebrae are assumed. Vertebral body heights   and sagittal alignment are within normal limits. The conus medullaris   is unremarkable in appearance on the sagittal images.  There is   advanced degenerative disc disease at L4-5 with complete loss of disc   height and disc signal. Early loss of disc signal without loss of disc   height is noted throughout the remainder of the lumbar spine. Small   central Schmorl's nodes are noted throughout the mid and upper lumbar   spine.       T12-L1, L1-2: No disc bulge or focal disc herniation.  No central or   foraminal stenosis.       L2-3: Minimal bulging of the disc annulus. No focal disc herniation.   No central or foraminal stenosis.       L3-4: Mild bulging of the disc annulus, with no focal disc herniation.   No central or foraminal stenosis.       L4-5: Lateral endplate spurring and loss of height result in mild to   moderate bilateral foraminal stenosis. No focal disc herniation or   central stenosis.       L5-S1: There is a left-sided intraforaminal disc protrusion or   herniation with moderate left L5 foraminal stenosis and mass effect on   the left L5 nerve root ganglion. Right lateral annular bulging results   in moderate to severe right foraminal stenosis as well. Moderately   advanced apophyseal joint degenerative changes are present, right   greater than left. No central stenosis.       IMPRESSION:   1. Multilevel degenerative disc disease, greatest at L4-5.   2. Left-sided intraforaminal disc protrusion or herniation at L5-S1   with mass effect on the left L5 nerve root ganglion.   3. Additional multilevel foraminal stenosis, greatest on the right at   L5-S1 and next greatest bilaterally at L4-5.   4. Apophyseal joint degenerative arthrosis, greatest on the right at   L5-S1.    Procedures:  Results:  Bilateral sural and left superficial peroneal sensory nerve action potentials were absent. Right median and ulnar sensory conduction studies demonstrated mild and moderate attenuation of amplitude, respectively, and moderately severe slowing of conduction. A right radial sensory conduction study was normal. Bilateral peroneal and left tibial compound muscle action potentials were absent. Right median motor conduction studies demonstrated mild to moderate prolongation of median distal latencies and mild slowing of median conduction in the forearm. A right ulnar motor conduction study demonstrated moderately severe conduction slowing across the elbow. Electromyography of the right upper and left  lower limbs demonstrated decreased insertional activity in the left tibialis anterior muscle and no abnormal spontaneous activity. Voluntary activation demonstrated no motor units in the tibialis anterior and markedly reduced recruitment in the medial gastrocnemius.     Interpretation:  This is an abnormal study, demonstrating electrophysiologic evidence of the followin. Severe, length-dependent sensorimotor axonal polyneuropathy.  2. Moderate right median neuropathy at the wrist.  3. Moderate right ulnar neuropathy at the elbow.  4. Right ulnar neuropathy at the wrist is not excluded.  5. No evidence of left lumbosacral radiculopathy.  6. No evidence of right cervical radiculopathy.      Laboratory:    [place] 4.6       Assessment and Plan:   Ai Boyer is a pleasant 61 year old female who presents today in follow up in evaluation of neuropathy.  She previously followed with Dr. Verde in the s in this regard and had what sounds to be a very thoughtful and thorough work up including CSF analysis, nerve biopsy, DNA testing, trial of IVIG among other things tested or tried.      Overall her presentation is confounded by previous serial surgeries to the leg/foot/ankle.  Lab work reviewed and notable for slightly elevated B6, and with elevated chromium and cobalt levels of unclear significance.  These have come down from previously, of note.     EMG reviewed and moderate to severe sensorimotor neuropathy in the legs is appreciated, most likely longstanding as correlative with her symptoms.  R median and R ulnar neuropathies were appreciated as well and have improved with braces at night.  Discussed that given the nature of her work ergonomic considerations could be added if these symptoms were to further worsen.    Given her feet becoming quite cold very easily: could be from neuropathy, to ensure good vascular flow will check LEONID ultrasound.     I am not sure if we will be able to find the data from when she  saw Dr. Verde in the 1990s but will see if our clinic can find this before she sees him in further evaluation to see if any other data would be reasonable to acquire at this point.  Genetic testing reviewed and normal which provided the patient with reassurance in this regard.    We will have follow up with me be dependent on her and Dr. Verde's plan.  She can reach out to me with any changes to her status or concerns in the interim.           Lázaro Jarquin MD   of Neurology   [facility]      The total time of this encounter today amounted to 22 minutes of video visit and 32 in total. This time included time spent with the patient, prep work, ordering tests, and performing post visit documentation.      Ai is a 61 year old who is being evaluated via a billable video visit.      How would you like to obtain your AVS? MyChart  If the video visit is dropped, the invitation should be resent by: Send to e-mail at: [email or web address]  Will anyone else be joining your video visit? No      Video Start Time: 7:57  Video-Visit Details    Type of service:  Video Visit    Video End Time: 8:19    Originating Location (pt. Location): Home    Distant Location (provider location):  [facility]UROLOG[facility] [place]     Platform used for Video Visit: [employer]      Again, thank you for allowing me to participate in the care of your patient.        Sincerely,        Brett Jarquin MD

## 2022-03-21 NOTE — PROGRESS NOTES
[facility]  Section of General Neurology  Return Patient Virtual Visit    Ai Boyer MRN# [id number]   Age: 61 year old YOB: 1960     Brief history of symptoms: The patient was initially seen in neurologic consultation on 12/2/21 for evaluation of long standing neuropathy. Please see the comprehensive neurologic consultation note from that date in the Epic records for details.         Interval history:   Subsequent invitae testing was normal/no genetic causes were found.EMG reviewed as below. I did show: Severe, length-dependent sensorimotor axonal polyneuropathy as well as Moderate right median neuropathy at the wrist, Moderate right ulnar neuropathy at the elbow  She has an upcoming appointment with Dr. Verde in neuromuscular department.    Lab work reviewed, heavy metals are elevated but trending down.      There is some data on cobalt toxicity being hard on nerves but this is also usually correlated with hearing, vision changes as well to my review of a few studies so it is unclear what relationship this has to her presentation.  She suspects her serial surgeries of the foot led to nerve damage.      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    She did trip and fall, hurt R shoulder since we last spoke.    Pain is present on outside of elbow at times.    Radiating pain improved.  No wrist pain any longer.  She does think the splints are help.  She notes she can still experience pain when she steps on something e.g.  Legs get very cold at times, she wonders about her circulation.    She saw her orthopaedic surgeon who did her hip subsequently. She tried an injection into her hip flexor, she then was sent to an ankle subspecialist. She is going to have this ankle surgery to even them  out so it does not affect her hips.        Past Medical History:     Patient Active Problem List   Diagnosis     Localized osteoarthrosis, ankle and foot     S/P revision of total hip     Positive Gram stain of synovial fluid obtained by arthrocentesis     At risk for healthcare associated infection     Past Medical History:   Diagnosis Date     Arthritis      At risk for healthcare associated infection 2019     Foot drop, bilateral      IBS (irritable bowel syndrome)      Peripheral neuropathy         Past Surgical History:     Past Surgical History:   Procedure Laterality Date      knee arthroscopy  Left     meniscus repair     ARTHRODESIS ANKLE  2012    Procedure:ARTHRODESIS ANKLE; right ankle medial closing wedge ostetomy intermedually nailing removal of hardware; Surgeon:HUMA GARRETT; Location: [facility]     ARTHROPLASTY REVISION HIP Left 2019    Procedure: Revision left total hip arthroplasty;  Surgeon: More Stephens MD;  Location: [facility] OR      SECTION       GYN SURGERY      oopherectomy     ORTHOPEDIC SURGERY Left 2009    left hip      right ankle fusion  Right         Social History:     Social History     Tobacco Use     Smoking status: Current Every Day Smoker     Packs/day: 1.00     Types: Cigarettes     Smokeless tobacco: Never Used     Tobacco comment: 1-pack daily    Substance Use Topics     Alcohol use: Yes     Comment: 1 - 4 times per week     Drug use: No        Family History:     Family History   Problem Relation Age of Onset     Multiple Sclerosis Mother      Hypertension Father         Medications:     Current Outpatient Medications   Medication Sig     Cholecalciferol (VITAMIN D3 PO) Take 5,000 Units by mouth every other day      ibuprofen (ADVIL/MOTRIN) 200 MG tablet Take 200-400 mg by mouth every 6 hours as needed for mild pain     MULTIPLE VITAMIN PO Take by mouth daily     order for DME Equipment being ordered: casey (Patient not taking: Reported on  2018)     venlafaxine (EFFEXOR-XR) 75 MG 24 hr capsule Take 1 capsule by mouth daily     No current facility-administered medications for this visit.        Allergies:     Allergies   Allergen Reactions     Sporanox [Itraconazole] Anaphylaxis     Amoxicillin Itching     Ceftin Hives     Clindamycin Hcl Itching     Codeine Sulfate Nausea     Morphine Hcl Hives     Keflex [Cephalexin Hcl] Rash          Physical Exam:   General: Seated comfortably in no acute distress.  HEENT: Neck with normal range of motion as can be assessed.   Skin: No rashes  Neurologic:     Mental Status: Fully alert, attentive and oriented. Speech clear and fluent, no paraphasic errors.     Cranial Nerves: Facial movements symmetric. Hearing not formally tested but intact to conversation.  No dysarthria.     Motor: No tremors or other abnormal movements observed.      Sensory:Not able to be tested virtually           Data: Pertinent prior to visit   Imaging:  Imagin MRI L Spine     HISTORY: Low back pain and sciatica.       TECHNIQUE: Sagittal T1 and T2, sagittal IR,  and transverse proton   density and T2 weighted pulse sequences.       FINDINGS: Five lumbar vertebrae are assumed. Vertebral body heights   and sagittal alignment are within normal limits. The conus medullaris   is unremarkable in appearance on the sagittal images.  There is   advanced degenerative disc disease at L4-5 with complete loss of disc   height and disc signal. Early loss of disc signal without loss of disc   height is noted throughout the remainder of the lumbar spine. Small   central Schmorl's nodes are noted throughout the mid and upper lumbar   spine.       T12-L1, L1-2: No disc bulge or focal disc herniation. No central or   foraminal stenosis.       L2-3: Minimal bulging of the disc annulus. No focal disc herniation.   No central or foraminal stenosis.       L3-4: Mild bulging of the disc annulus, with no focal disc herniation.   No central or  foraminal stenosis.       L4-5: Lateral endplate spurring and loss of height result in mild to   moderate bilateral foraminal stenosis. No focal disc herniation or   central stenosis.       L5-S1: There is a left-sided intraforaminal disc protrusion or   herniation with moderate left L5 foraminal stenosis and mass effect on   the left L5 nerve root ganglion. Right lateral annular bulging results   in moderate to severe right foraminal stenosis as well. Moderately   advanced apophyseal joint degenerative changes are present, right   greater than left. No central stenosis.       IMPRESSION:   1. Multilevel degenerative disc disease, greatest at L4-5.   2. Left-sided intraforaminal disc protrusion or herniation at L5-S1   with mass effect on the left L5 nerve root ganglion.   3. Additional multilevel foraminal stenosis, greatest on the right at   L5-S1 and next greatest bilaterally at L4-5.   4. Apophyseal joint degenerative arthrosis, greatest on the right at   L5-S1.    Procedures:  Results:  Bilateral sural and left superficial peroneal sensory nerve action potentials were absent. Right median and ulnar sensory conduction studies demonstrated mild and moderate attenuation of amplitude, respectively, and moderately severe slowing of conduction. A right radial sensory conduction study was normal. Bilateral peroneal and left tibial compound muscle action potentials were absent. Right median motor conduction studies demonstrated mild to moderate prolongation of median distal latencies and mild slowing of median conduction in the forearm. A right ulnar motor conduction study demonstrated moderately severe conduction slowing across the elbow. Electromyography of the right upper and left lower limbs demonstrated decreased insertional activity in the left tibialis anterior muscle and no abnormal spontaneous activity. Voluntary activation demonstrated no motor units in the tibialis anterior and markedly reduced recruitment  in the medial gastrocnemius.     Interpretation:  This is an abnormal study, demonstrating electrophysiologic evidence of the followin. Severe, length-dependent sensorimotor axonal polyneuropathy.  2. Moderate right median neuropathy at the wrist.  3. Moderate right ulnar neuropathy at the elbow.  4. Right ulnar neuropathy at the wrist is not excluded.  5. No evidence of left lumbosacral radiculopathy.  6. No evidence of right cervical radiculopathy.      Laboratory:    [place] 4.6       Assessment and Plan:   Ai Boyer is a pleasant 61 year old female who presents today in follow up in evaluation of neuropathy.  She previously followed with Dr. Verde in the  in this regard and had what sounds to be a very thoughtful and thorough work up including CSF analysis, nerve biopsy, DNA testing, trial of IVIG among other things tested or tried.      Overall her presentation is confounded by previous serial surgeries to the leg/foot/ankle.  Lab work reviewed and notable for slightly elevated B6, and with elevated chromium and cobalt levels of unclear significance.  These have come down from previously, of note.     EMG reviewed and moderate to severe sensorimotor neuropathy in the legs is appreciated, most likely longstanding as correlative with her symptoms.  R median and R ulnar neuropathies were appreciated as well and have improved with braces at night.  Discussed that given the nature of her work ergonomic considerations could be added if these symptoms were to further worsen.    Given her feet becoming quite cold very easily: could be from neuropathy, to ensure good vascular flow will check LEONID ultrasound.     I am not sure if we will be able to find the data from when she saw Dr. Verde in the  but will see if our clinic can find this before she sees him in further evaluation to see if any other data would be reasonable to acquire at this point.  Genetic testing reviewed and normal which provided the  patient with reassurance in this regard.    We will have follow up with me be dependent on her and Dr. Verde's plan.  She can reach out to me with any changes to her status or concerns in the interim.           Lázaro Jarquin MD   of Neurology   [facility]      The total time of this encounter today amounted to 22 minutes of video visit and 32 in total. This time included time spent with the patient, prep work, ordering tests, and performing post visit documentation.

## 2022-03-21 NOTE — PROGRESS NOTES
Ai is a 61 year old who is being evaluated via a billable video visit.      How would you like to obtain your AVS? MyChart  If the video visit is dropped, the invitation should be resent by: Send to e-mail at: [email or web address]  Will anyone else be joining your video visit? No      Video Start Time: 7:57  Video-Visit Details    Type of service:  Video Visit    Video End Time: 8:19    Originating Location (pt. Location): Home    Distant Location (provider location):  [facility] NEUROLOGY CLIN[facility] [place]     Platform used for Video Visit: [employer]

## 2022-05-31 ENCOUNTER — OFFICE VISIT (OUTPATIENT)
Dept: NEUROLOGY | Facility: CLINIC | Age: 62
End: 2022-05-31
Payer: COMMERCIAL

## 2022-05-31 VITALS
WEIGHT: 206 LBS | HEIGHT: 70 IN | HEART RATE: 93 BPM | DIASTOLIC BLOOD PRESSURE: 88 MMHG | BODY MASS INDEX: 29.49 KG/M2 | SYSTOLIC BLOOD PRESSURE: 149 MMHG | OXYGEN SATURATION: 97 %

## 2022-05-31 DIAGNOSIS — M21.372 FOOT DROP, BILATERAL: Primary | ICD-10-CM

## 2022-05-31 DIAGNOSIS — M21.371 FOOT DROP, BILATERAL: Primary | ICD-10-CM

## 2022-05-31 PROCEDURE — 99215 OFFICE O/P EST HI 40 MIN: CPT | Performed by: PSYCHIATRY & NEUROLOGY

## 2022-05-31 RX ORDER — ZINC GLUCONATE 50 MG
50 TABLET ORAL DAILY
COMMUNITY

## 2022-05-31 ASSESSMENT — PAIN SCALES - GENERAL: PAINLEVEL: MODERATE PAIN (4)

## 2022-05-31 NOTE — PATIENT INSTRUCTIONS
No new recommendations regarding neuropathy at this time.  If you choose to try Cymbalta for neuropathy pain and anxiety, I generally prescribe 30 mg daily for 5 days, then 60 mg daily.  Referral for consideration of a Turbo-Med brace at the ankle. You can also consider a Foot-Up. The orthotist can discuss this with you but the Foot-Up is available online I believe.   SUBJECTIVE:    Patient ID: Albert Young is a 49 y.o. female. Medical History Review  Past Medical, Family, and Social History reviewed and does contribute to the patient presenting condition    Health Maintenance Due   Topic Date Due    HIV screen  06/28/1985    DTaP/Tdap/Td vaccine (1 - Tdap) 06/28/1989    Shingles Vaccine (1 of 2) 06/28/2020    Colon cancer screen colonoscopy  06/28/2020    Flu vaccine (1) 09/01/2020       HPI:   Chief Complaint   Patient presents with    Gastroesophageal Reflux    Headache    Pain   She had a back injection in July. She sees pain management. They gave her Tylenol #3, which makes her feel drunk. She would like a stronger muscle relaxer. Patient's medications, allergies, past medical, surgical, social and family histories were reviewed and updated as appropriate. Review of Systems Reviewed and acurate. See MA note. OBJECTIVE:  /70 (Site: Right Upper Arm, Position: Sitting)   Pulse 70   Temp 99 °F (37.2 °C) (Temporal)   Resp 16   Ht 5' 6\" (1.676 m)   Wt 151 lb (68.5 kg)   SpO2 98% Comment: room air  BMI 24.37 kg/m²    Physical Exam  Vitals signs reviewed. Constitutional:       General: She is not in acute distress. Appearance: She is well-developed. HENT:      Head: Normocephalic. Right Ear: Tympanic membrane normal.      Left Ear: Tympanic membrane normal.      Mouth/Throat:      Pharynx: No oropharyngeal exudate. Eyes:      General: Lids are normal.   Neck:      Musculoskeletal: Neck supple. Cardiovascular:      Rate and Rhythm: Normal rate and regular rhythm. Heart sounds: Normal heart sounds. Pulmonary:      Effort: Pulmonary effort is normal.      Breath sounds: Normal breath sounds. Abdominal:      General: Bowel sounds are normal. There is no distension. Palpations: Abdomen is soft. Tenderness: There is no abdominal tenderness.    Musculoskeletal:      Lumbar back: She exhibits decreased range of motion and pain.   Lymphadenopathy:      Cervical: No cervical adenopathy. Skin:     General: Skin is warm and dry. Neurological:      Mental Status: She is alert and oriented to person, place, and time. No results found for requested labs within last 30 days. Microscopic Examination (no units)   Date Value   09/27/2017 Not Indicated     LDL Calculated (mg/dL)   Date Value   01/08/2019 124       Lab Results   Component Value Date    WBC 19.5 (H) 07/17/2019    NEUTROABS 14.1 (H) 07/17/2019    HGB 15.0 07/17/2019    HCT 44.6 07/17/2019    MCV 95.9 07/17/2019     (H) 07/17/2019     Lab Results   Component Value Date    TSH 3.55 09/10/2017       Prior to Visit Medications    Medication Sig Taking? Authorizing Provider   acetaminophen-codeine (TYLENOL/CODEINE #3) 300-30 MG per tablet Take 1 tablet by mouth every 6 hours as needed for Pain.   Yes Historical Provider, MD   tiZANidine (ZANAFLEX) 4 MG tablet Take 1 tablet by mouth every 8 hours as needed (muscle spasms) Yes RAYNA Addison   polyethylene glycol (GLYCOLAX) 17 GM/SCOOP powder Take 17 g by mouth daily Yes RAYNA Addison   gabapentin (NEURONTIN) 600 MG tablet TAKE ONE TABLET BY MOUTH FOUR TIMES A DAY Yes RAYNA Addison   albuterol sulfate  (90 Base) MCG/ACT inhaler INHALE 2 PUFFS INTO THE LUNGS EVERY 4 HOURS AS NEEDED Yes RAYNA Addison   loratadine (CLARITIN) 10 MG tablet TAKE 1 TABLET BY MOUTH DAILY Yes RAYNA Addison   baclofen (LIORESAL) 10 MG tablet TAKE 1 TABLET BY MOUTH 3 TIMES DAILY AS NEEDED Yes RAYNA Addison   omeprazole (PRILOSEC) 40 MG delayed release capsule TAKE 1 CAPSULE BY MOUTH DAILY Yes RAYNA Addison   cyanocobalamin ([employer] VITAMIN B12) 1000 MCG tablet Take 1 tablet by mouth daily Yes RAYNA Addison   diclofenac sodium 1 % GEL Apply 2 g topically 4 times daily Yes RAYNA Addison   rizatriptan (MAXALT) 10 MG tablet Take 1 tablet by mouth once as needed for Migraine May repeat in 2 hours if needed Yes Rimma Marking, APRN   ibuprofen (IBU) 600 MG tablet Take 1 tablet by mouth every 6 hours as needed for Pain Yes Mitch Rice, DO   promethazine (PHENERGAN) 25 MG tablet TAKE ONE TABLET BY MOUTH EVERY 6 HOURS AS NEEDED FOR NAUSEA  Rimma Marking, APRN       ASSESSMENT:  1. Lumbar radiculopathy    2. Hypercholesterolemia    3. Edema, unspecified type    4. Colon cancer screening    5. Constipation, unspecified constipation type        PLAN:  Orders Placed This Encounter   Medications    tiZANidine (ZANAFLEX) 4 MG tablet     Sig: Take 1 tablet by mouth every 8 hours as needed (muscle spasms)     Dispense:  60 tablet     Refill:  5    polyethylene glycol (GLYCOLAX) 17 GM/SCOOP powder     Sig: Take 17 g by mouth daily     Dispense:  510 g     Refill:  5     Orders Placed This Encounter   Procedures    COMPREHENSIVE METABOLIC PANEL    CBC WITH AUTO DIFFERENTIAL    LIPID PANEL    External Referral To General Surgery     Patient Instructions   · Keep a list of your medicines with you. List all of the prescription medicines, nonprescription medicines, supplements, natural remedies, and vitamins that you take. Tell your healthcare providers who treat you about all of the products you are taking. Your provider can provide you with a form to keep track of them. Just ask. · Follow the directions that come with your medicine, including information about food or alcohol. Make sure you know how and when to take your medicine. Do not take more or less than you are supposed to take. · Keep all medicines out of the reach of children. · Store medicines according to the directions on the label. · Monitor yourself. Learn to know how your body reacts to your new medicine and keep track of how it makes you feel before attempting (If your provider has allowed you to do so) to drive or go to work. · Seek emergency medical attention if you think you have used too much of this medicine.  An overdose of any prescription medicine can be fatal. Overdose symptoms may include extreme drowsiness, muscle weakness, confusion, cold and clammy skin, pinpoint pupils, shallow breathing, slow heart rate, fainting, or coma. · Don't share prescription medicines with others, even when they seem to have the same symptoms. What may be good for you may be harmful to others. · If you are no longer taking a prescribed medication and you have pills left please take your pills out of their original containers. Mix crushed pills with an undesirable substance, such as cat litter or used coffee grounds. Put the mixture into a disposable container with a lid, such as an empty margarine tub, or into a sealable bag. Cover up or remove any of your personal information on the empty containers by covering it with black permanent marker or duct tape. Place the sealed container with the mixture, and the empty drug containers, in the trash. · If you use a medication that is in the form of a patch, dispose of used patches by folding them in half so that the sticky sides meet, and then flushing them down a toilet. They should not be placed in the household trash where children or pets can find them. · If you have any questions, ask your provider or pharmacist for more information. · Be sure to keep all appointments for provider visits or tests. We are committed to providing you with the best care possible. In order to help us achieve these goals please remember to bring all medications, herbal products, and over the counter supplements with you to each visit. If your provider has ordered testing for you, please be sure to follow up with our office if you have not received results within 7 days after the testing took place. *If you receive a survey after visiting one of our offices, please take time to share your experience concerning your physician office visit.  These surveys are confidential and no health information about you is shared. We are eager to improve for you and we are counting on your feedback to help make that happen. Thank you for requesting your Continuity of Care Document (CCD) electronically. Please follow the instructions below to securely access your online medical record. [employer] allows you to send messages to your doctor, view your test results, renew your prescriptions, schedule appointments, and more. How Do I Access my CCD? In your Internet browser, go to [email or web address] Enter your user name and password   Click on My medical Record  --> Download Summary --> Enter Password --> Download --> Save or Open Document    Additional Information  If you have questions, please contact your physician practice where you receive care. Remember, [employer] is NOT to be used for urgent needs. For medical emergencies, dial 911. Return in about 3 months (around 11/20/2020).

## 2022-05-31 NOTE — LETTER
5/31/2022         RE: Ai Boyer  [id number]  [facility] [address]        Dear Colleague,    Thank you for referring your patient, Ai Boyer, to the [facility] [place]. Please see a copy of my visit note below.    Ai Boyer's goals for this visit include:   Chief Complaint   Patient presents with     New Patient     NPP sent 4/22  neuropathy desmond Jarquin-Has seen [facility] and in past// foot drop and falls ( Fell this AM and fell on back)       She requests these members of her care team be copied on today's visit information: yes    PCP: Dangelo Koehler    Referring Provider:  Brett Jarquin MD  [address] [place] [address]    There were no vitals taken for this visit.    Do you need any medication refills at today's visit? No  SEPIDEH Ansari, [facility] ([facility])        62 year old woman with sensorimotor neuropathy; see prior notes from me and from Dr Jarquin. Her principal functional concern is that she falls frequently, with risk of injury, due to left foot weakness. She has not tolerated AFOs because of ankle pain. 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      Examination    Mental state: Alert, appropriate, speech, language, and thought content normal.     Cranial nerves II-XII normal.    Sensory examination:     Right Left   Light touch Marginal foot marginal foot   Vibration (timed) MM5 MM5   Vibration (Rydell-Seiffer)     Temp     Pin Variable feet Variable feet   Pos     Legend:   MM = medial malleolus, TT = tibial tuberosity, K = patella, MCP = MCP joint  MF = mid-foot, DC = distal calf, MC = mid calf, PC = proximal calf      Motor examination:     Right Left   Shoulder abduction  5 5   Elbow extension 5 5   Elbow flexion 5 5   Wrist extension  5 5   Finger extension 5- 5-   FDI 5 5   APB 5 5   Hip  flexion 5 5   Knee flexion 5 5   Knee extension 5 5   Dorsiflexion 0 0   Plantar flexion 0 0   A=atrophy    Tone normal     Reflexes:   Right Left   Biceps 2 2   BRD 2 2   Triceps 2 2   Monica 2 2   Patellar 2 2   Achilles 2 2   Plantar Flexor Flexor   Clonus Absent Absent      Coordination:  Finger-nose normal.  Heel-shin normal.  RRMs normal.    Gait:  Awkward, steppage.      Impression:  Severe bilateral foot weakness and sensory deficits result in significant disability and risk of falls, limiting gait as well.   I see no clear indication for further diagnostic efforts.    Recommendations:  No further neuropathy investigations.  Orthotics consult. I did indicate a foot-up might be an option, as she obtains benefit from a soft brace alone; however the degree of plantar flexion weakness makes it less likely to be beneficial. Left message for patient regarding the possibility that plantarflexion weakness may mitigate potential benefit of orthotics, but since she is already reporting benefit from soft bracing at the ankle it may help.    Adeel Verde M.D.      60 minutes spent on the date of the encounter on chart review, history and examination, documentation and further activities as noted above.            Again, thank you for allowing me to participate in the care of your patient.        Sincerely,        Adeel Verde MD

## 2022-05-31 NOTE — PROGRESS NOTES
62 year old woman with sensorimotor neuropathy; see prior notes from me and from Dr Jarquin. Her principal functional concern is that she falls frequently, with risk of injury, due to left foot weakness. She has not tolerated AFOs because of ankle pain. 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      Examination    Mental state: Alert, appropriate, speech, language, and thought content normal.     Cranial nerves II-XII normal.    Sensory examination:     Right Left   Light touch Marginal foot marginal foot   Vibration (timed) MM5 MM5   Vibration (Rydell-Seiffer)     Temp     Pin Variable feet Variable feet   Pos     Legend:   MM = medial malleolus, TT = tibial tuberosity, K = patella, MCP = MCP joint  MF = mid-foot, DC = distal calf, MC = mid calf, PC = proximal calf      Motor examination:     Right Left   Shoulder abduction  5 5   Elbow extension 5 5   Elbow flexion 5 5   Wrist extension  5 5   Finger extension 5- 5-   FDI 5 5   APB 5 5   Hip flexion 5 5   Knee flexion 5 5   Knee extension 5 5   Dorsiflexion 0 0   Plantar flexion 0 0   A=atrophy    Tone normal     Reflexes:   Right Left   Biceps 2 2   BRD 2 2   Triceps 2 2   Monica 2 2   Patellar 2 2   Achilles 2 2   Plantar Flexor Flexor   Clonus Absent Absent      Coordination:  Finger-nose normal.  Heel-shin normal.  RRMs normal.    Gait:  Awkward, steppage.      Impression:  Severe bilateral foot weakness and sensory deficits result in significant disability and risk of falls, limiting gait as well.   I see no clear indication for further diagnostic efforts.    Recommendations:  No further neuropathy investigations.  Orthotics consult. I did indicate a foot-up might be an option, as she obtains benefit from a soft brace alone; however the degree of plantar flexion weakness makes it less likely to be beneficial. Left message  for patient regarding the possibility that plantarflexion weakness may mitigate potential benefit of orthotics, but since she is already reporting benefit from soft bracing at the ankle it may help.    Adeel Verde M.D.      60 minutes spent on the date of the encounter on chart review, history and examination, documentation and further activities as noted above.

## 2022-05-31 NOTE — PROGRESS NOTES
Ai Boyer's goals for this visit include:   Chief Complaint   Patient presents with     New Patient     NPP sent 4/22  neuropathy rp Britton-Has seen [facility] for EM[facility] and in past// foot drop and falls ( Fell this AM and fell on back)       She requests these members of her care team be copied on today's visit information: yes    PCP: Dangelo Koehler    Referring Provider:  Brett Jarquin MD  [address] [place] [id number]    There were no vitals taken for this visit.    Do you need any medication refills at today's visit? No  SEPIDEH Ansari, SUSANNE ([facility])

## 2022-06-02 ENCOUNTER — PATIENT OUTREACH (OUTPATIENT)
Dept: CARE COORDINATION | Facility: CLINIC | Age: 62
End: 2022-06-02
Payer: COMMERCIAL

## 2022-06-02 NOTE — PROGRESS NOTES
Social Work Telephone Message Note  [facility]     Patient Name:  Ai Boyer  /Age:  1960 (62 year old)    Referral Source: Dr Verde  Reason for Referral:  Disability resources     contacted Patient via telephone on 22. Spoke to Ai briefly as she was in her car driving to a clients home.  Main discussion was surrounding disability and how to start process.  Ai requested that writer email contact information for Disability HUB, also web link to Social Security application.  Discussed briefly, long and short term disability via her employer and also options for medical insurance.    Emailed information to patients personal email - [email or web address]      Sw will continue to assist as needed.           WALTER Iglesias, [facility]    [facility]  [phone]  [email or web address]

## 2022-06-06 ENCOUNTER — TELEPHONE (OUTPATIENT)
Dept: NEUROLOGY | Facility: CLINIC | Age: 62
End: 2022-06-06
Payer: COMMERCIAL

## 2022-06-06 NOTE — TELEPHONE ENCOUNTER
[facility] Call Center    Phone Message    May a detailed message be left on voicemail: yes     Reason for Call: Other: pt calling because she noticed on her after visit summary her old PCP was still listed. She wants to make sure her after visit summary was sent to the correct provider. It should be sent to Dr. Dangelo Amaya   [facility]    [phone]    [phone] (Fax)    Action Taken: Other: neurology    Travel Screening: Not Applicable

## 2022-06-09 NOTE — TELEPHONE ENCOUNTER
Called patient and spoke with her stating her PCP has been updated and the recent notes have been faxed. Patient stated understanding.

## 2022-06-09 NOTE — TELEPHONE ENCOUNTER
Faxed office visit notes from Dr. Verde and Dr. Jarquin to patient PCP per patient request. Fax receipt confirmed via rightfax.

## 2022-09-15 ENCOUNTER — LAB (OUTPATIENT)
Dept: LAB | Facility: CLINIC | Age: 62
End: 2022-09-15
Payer: COMMERCIAL

## 2022-09-15 DIAGNOSIS — Z20.822 ENCOUNTER FOR LABORATORY TESTING FOR COVID-19 VIRUS: ICD-10-CM

## 2022-09-15 PROCEDURE — U0005 INFEC AGEN DETEC AMPLI PROBE: HCPCS

## 2022-09-15 PROCEDURE — U0003 INFECTIOUS AGENT DETECTION BY NUCLEIC ACID (DNA OR RNA); SEVERE ACUTE RESPIRATORY SYNDROME CORONAVIRUS 2 (SARS-COV-2) (CORONAVIRUS DISEASE [COVID-19]), AMPLIFIED PROBE TECHNIQUE, MAKING USE OF HIGH THROUGHPUT TECHNOLOGIES AS DESCRIBED BY CMS-2020-01-R: HCPCS

## 2022-09-16 LAB — SARS-COV-2 RNA RESP QL NAA+PROBE: NEGATIVE

## 2022-09-16 RX ORDER — ALBUTEROL SULFATE 90 UG/1
2 AEROSOL, METERED RESPIRATORY (INHALATION) EVERY 4 HOURS PRN
COMMUNITY
End: 2022-10-25

## 2022-09-16 RX ORDER — DULOXETIN HYDROCHLORIDE 30 MG/1
60 CAPSULE, DELAYED RELEASE ORAL DAILY
COMMUNITY
End: 2022-10-25

## 2022-09-16 RX ORDER — AZITHROMYCIN 500 MG/1
500 TABLET, FILM COATED ORAL
COMMUNITY

## 2022-09-16 RX ORDER — MULTIVIT WITH MINERALS/LUTEIN
1000 TABLET ORAL DAILY
COMMUNITY

## 2022-09-16 NOTE — PROGRESS NOTES
PTA medications updated by Medication Scribe prior to surgery via phone call with patient (last doses completed by Nurse)     Medication history sources: Patient, Surescripts, H&P and Patient's home med list  In the past week, patient estimated taking medication this percent of the time: Greater than 90%  Adherence assessment: N/A Not Observed    Significant changes made to the medication list:  Patient reports no longer taking the following meds (med scribe removed from PTA med list): Lorazepam, Omeprazole      Additional medication history information:   Patient was advised to bring: Albuterol Inhaler    Medication reconciliation completed by provider prior to medication history? No    Time spent in this activity: 40 minutes    The information provided in this note is only as accurate as the sources available at the time of update(s)    Prior to Admission medications    Medication Sig Last Dose Taking? Auth Provider Long Term End Date   albuterol (PROAIR HFA/PROVENTIL HFA/VENTOLIN HFA) 108 (90 Base) MCG/ACT inhaler Inhale 2 puffs into the lungs every 4 hours as needed for shortness of breath / dyspnea or wheezing  at prn Yes Reported, Patient Yes    azithromycin (ZITHROMAX) 500 MG tablet Take 500 mg by mouth once as needed (one hour prior to dental appointments)  at prn Yes Reported, Patient     Cholecalciferol (VITAMIN D3) 50 MCG (2000 UT) CAPS Take 1 capsule by mouth daily 9/11/2022 at am Yes Reported, Patient     DULoxetine (CYMBALTA) 30 MG capsule Take 60 mg by mouth daily (2 x 30 mg = 60 mg) 9/5/2022 at am Yes Reported, Patient Yes    hydrOXYzine (ATARAX) 25 MG tablet Take 1 tablet by mouth daily as needed  at prn Yes Reported, Patient     ibuprofen (ADVIL/MOTRIN) 200 MG tablet Take 200-400 mg by mouth every 6 hours as needed for mild pain  at prn Yes Reported, Patient     MULTIPLE VITAMIN PO Take 2 capsules by mouth daily 9/9/2022 at am Yes Reported, Patient     potassium 99 MG TABS Take 1 tablet by mouth  daily MORE THAN TWO WEEKS AGO at UNKNOWN Yes Reported, Patient     vitamin C (ASCORBIC ACID) 1000 MG TABS Take 1,000 mg by mouth daily 9/11/2022 at am Yes Reported, Patient     zinc gluconate 50 MG tablet Take 50 mg by mouth daily 9/11/2022 at am Yes Reported, Patient       Medication history completed by:    Florencio Coffey CPhT  Medication [facility]

## 2022-09-19 ENCOUNTER — ANESTHESIA EVENT (OUTPATIENT)
Dept: SURGERY | Facility: CLINIC | Age: 62
End: 2022-09-19
Payer: COMMERCIAL

## 2022-09-19 ENCOUNTER — APPOINTMENT (OUTPATIENT)
Dept: GENERAL RADIOLOGY | Facility: CLINIC | Age: 62
End: 2022-09-19
Attending: ORTHOPAEDIC SURGERY
Payer: COMMERCIAL

## 2022-09-19 ENCOUNTER — HOSPITAL ENCOUNTER (OUTPATIENT)
Facility: CLINIC | Age: 62
Discharge: HOME | End: 2022-09-20
Attending: ORTHOPAEDIC SURGERY | Admitting: ORTHOPAEDIC SURGERY
Payer: COMMERCIAL

## 2022-09-19 ENCOUNTER — ANESTHESIA (OUTPATIENT)
Dept: SURGERY | Facility: CLINIC | Age: 62
End: 2022-09-19
Payer: COMMERCIAL

## 2022-09-19 DIAGNOSIS — Z98.1 S/P ANKLE FUSION: Primary | ICD-10-CM

## 2022-09-19 LAB
CREAT SERPL-MCNC: 0.72 MG/DL (ref 0.52–1.04)
GFR SERPL CREATININE-BSD FRML MDRD: >90 ML/MIN/1.73M2

## 2022-09-19 PROCEDURE — 278N000051 HC OR IMPLANT GENERAL: Performed by: ORTHOPAEDIC SURGERY

## 2022-09-19 PROCEDURE — 258N000003 HC RX IP 258 OP 636: Performed by: PHYSICIAN ASSISTANT

## 2022-09-19 PROCEDURE — 710N000009 HC RECOVERY PHASE 1, LEVEL 1, PER MIN: Performed by: ORTHOPAEDIC SURGERY

## 2022-09-19 PROCEDURE — 250N000009 HC RX 250: Performed by: ORTHOPAEDIC SURGERY

## 2022-09-19 PROCEDURE — 370N000017 HC ANESTHESIA TECHNICAL FEE, PER MIN: Performed by: ORTHOPAEDIC SURGERY

## 2022-09-19 PROCEDURE — 250N000011 HC RX IP 250 OP 636

## 2022-09-19 PROCEDURE — 250N000025 HC SEVOFLURANE, PER MIN: Performed by: ORTHOPAEDIC SURGERY

## 2022-09-19 PROCEDURE — 250N000013 HC RX MED GY IP 250 OP 250 PS 637: Performed by: ANESTHESIOLOGY

## 2022-09-19 PROCEDURE — 250N000009 HC RX 250

## 2022-09-19 PROCEDURE — 82565 ASSAY OF CREATININE: CPT | Performed by: ORTHOPAEDIC SURGERY

## 2022-09-19 PROCEDURE — C1713 ANCHOR/SCREW BN/BN,TIS/BN: HCPCS | Performed by: ORTHOPAEDIC SURGERY

## 2022-09-19 PROCEDURE — 250N000011 HC RX IP 250 OP 636: Performed by: PHYSICIAN ASSISTANT

## 2022-09-19 PROCEDURE — 272N000001 HC OR GENERAL SUPPLY STERILE: Performed by: ORTHOPAEDIC SURGERY

## 2022-09-19 PROCEDURE — 360N000083 HC SURGERY LEVEL 3 W/ FLUORO, PER MIN: Performed by: ORTHOPAEDIC SURGERY

## 2022-09-19 PROCEDURE — 999N000141 HC STATISTIC PRE-PROCEDURE NURSING ASSESSMENT: Performed by: ORTHOPAEDIC SURGERY

## 2022-09-19 PROCEDURE — 999N000179 XR SURGERY CARM FLUORO LESS THAN 5 MIN W STILLS

## 2022-09-19 PROCEDURE — 271N000001 HC OR GENERAL SUPPLY NON-STERILE: Performed by: ORTHOPAEDIC SURGERY

## 2022-09-19 PROCEDURE — 36415 COLL VENOUS BLD VENIPUNCTURE: CPT | Performed by: ORTHOPAEDIC SURGERY

## 2022-09-19 PROCEDURE — 250N000013 HC RX MED GY IP 250 OP 250 PS 637: Performed by: PHYSICIAN ASSISTANT

## 2022-09-19 PROCEDURE — 250N000011 HC RX IP 250 OP 636: Performed by: ANESTHESIOLOGY

## 2022-09-19 PROCEDURE — 258N000003 HC RX IP 258 OP 636: Performed by: ANESTHESIOLOGY

## 2022-09-19 DEVICE — LOCKING SCREW, FULLY THREADED
Type: IMPLANTABLE DEVICE | Site: ANKLE | Status: NON-FUNCTIONAL
Removed: 2023-09-11

## 2022-09-19 DEVICE — LOCKING SCREW, FULLY THREADED: Type: IMPLANTABLE DEVICE | Site: ANKLE | Status: FUNCTIONAL

## 2022-09-19 DEVICE — END CAP, SCN
Type: IMPLANTABLE DEVICE | Site: ANKLE | Status: FUNCTIONAL
Brand: T2

## 2022-09-19 DEVICE — ANKLE ARTHRODESIS NAIL, LEFT
Type: IMPLANTABLE DEVICE | Site: ANKLE | Status: FUNCTIONAL
Brand: T2

## 2022-09-19 RX ORDER — SODIUM CHLORIDE, SODIUM LACTATE, POTASSIUM CHLORIDE, CALCIUM CHLORIDE 600; 310; 30; 20 MG/100ML; MG/100ML; MG/100ML; MG/100ML
INJECTION, SOLUTION INTRAVENOUS CONTINUOUS
Status: DISCONTINUED | OUTPATIENT
Start: 2022-09-19 | End: 2022-09-19 | Stop reason: HOSPADM

## 2022-09-19 RX ORDER — PROPOFOL 10 MG/ML
INJECTION, EMULSION INTRAVENOUS PRN
Status: DISCONTINUED | OUTPATIENT
Start: 2022-09-19 | End: 2022-09-19

## 2022-09-19 RX ORDER — HYDRALAZINE HYDROCHLORIDE 20 MG/ML
2.5-5 INJECTION INTRAMUSCULAR; INTRAVENOUS EVERY 10 MIN PRN
Status: DISCONTINUED | OUTPATIENT
Start: 2022-09-19 | End: 2022-09-19 | Stop reason: HOSPADM

## 2022-09-19 RX ORDER — ACETAMINOPHEN 325 MG/1
650 TABLET ORAL EVERY 4 HOURS PRN
Status: DISCONTINUED | OUTPATIENT
Start: 2022-09-22 | End: 2022-09-20 | Stop reason: HOSPADM

## 2022-09-19 RX ORDER — NALOXONE HYDROCHLORIDE 0.4 MG/ML
0.4 INJECTION, SOLUTION INTRAMUSCULAR; INTRAVENOUS; SUBCUTANEOUS
Status: DISCONTINUED | OUTPATIENT
Start: 2022-09-19 | End: 2022-09-20 | Stop reason: HOSPADM

## 2022-09-19 RX ORDER — AMOXICILLIN 250 MG
1 CAPSULE ORAL 2 TIMES DAILY
Status: DISCONTINUED | OUTPATIENT
Start: 2022-09-19 | End: 2022-09-20 | Stop reason: HOSPADM

## 2022-09-19 RX ORDER — PROCHLORPERAZINE MALEATE 5 MG
10 TABLET ORAL EVERY 6 HOURS PRN
Status: DISCONTINUED | OUTPATIENT
Start: 2022-09-19 | End: 2022-09-20 | Stop reason: HOSPADM

## 2022-09-19 RX ORDER — DEXAMETHASONE SODIUM PHOSPHATE 4 MG/ML
INJECTION, SOLUTION INTRA-ARTICULAR; INTRALESIONAL; INTRAMUSCULAR; INTRAVENOUS; SOFT TISSUE PRN
Status: DISCONTINUED | OUTPATIENT
Start: 2022-09-19 | End: 2022-09-19

## 2022-09-19 RX ORDER — ONDANSETRON 4 MG/1
4 TABLET, ORALLY DISINTEGRATING ORAL EVERY 6 HOURS PRN
Status: DISCONTINUED | OUTPATIENT
Start: 2022-09-19 | End: 2022-09-20 | Stop reason: HOSPADM

## 2022-09-19 RX ORDER — ONDANSETRON 2 MG/ML
4 INJECTION INTRAMUSCULAR; INTRAVENOUS EVERY 30 MIN PRN
Status: DISCONTINUED | OUTPATIENT
Start: 2022-09-19 | End: 2022-09-19 | Stop reason: HOSPADM

## 2022-09-19 RX ORDER — ONDANSETRON 2 MG/ML
4 INJECTION INTRAMUSCULAR; INTRAVENOUS EVERY 6 HOURS PRN
Status: DISCONTINUED | OUTPATIENT
Start: 2022-09-19 | End: 2022-09-20 | Stop reason: HOSPADM

## 2022-09-19 RX ORDER — NALOXONE HYDROCHLORIDE 0.4 MG/ML
0.2 INJECTION, SOLUTION INTRAMUSCULAR; INTRAVENOUS; SUBCUTANEOUS
Status: DISCONTINUED | OUTPATIENT
Start: 2022-09-19 | End: 2022-09-20 | Stop reason: HOSPADM

## 2022-09-19 RX ORDER — HYDROMORPHONE HCL IN WATER/PF 6 MG/30 ML
0.2 PATIENT CONTROLLED ANALGESIA SYRINGE INTRAVENOUS EVERY 5 MIN PRN
Status: DISCONTINUED | OUTPATIENT
Start: 2022-09-19 | End: 2022-09-19 | Stop reason: HOSPADM

## 2022-09-19 RX ORDER — FENTANYL CITRATE 0.05 MG/ML
50 INJECTION, SOLUTION INTRAMUSCULAR; INTRAVENOUS EVERY 5 MIN PRN
Status: DISCONTINUED | OUTPATIENT
Start: 2022-09-19 | End: 2022-09-19 | Stop reason: HOSPADM

## 2022-09-19 RX ORDER — HYDROMORPHONE HCL IN WATER/PF 6 MG/30 ML
0.5 PATIENT CONTROLLED ANALGESIA SYRINGE INTRAVENOUS
Status: DISCONTINUED | OUTPATIENT
Start: 2022-09-19 | End: 2022-09-20 | Stop reason: HOSPADM

## 2022-09-19 RX ORDER — POLYETHYLENE GLYCOL 3350 17 G/17G
17 POWDER, FOR SOLUTION ORAL DAILY
Status: DISCONTINUED | OUTPATIENT
Start: 2022-09-20 | End: 2022-09-20 | Stop reason: HOSPADM

## 2022-09-19 RX ORDER — SODIUM CHLORIDE, SODIUM LACTATE, POTASSIUM CHLORIDE, CALCIUM CHLORIDE 600; 310; 30; 20 MG/100ML; MG/100ML; MG/100ML; MG/100ML
INJECTION, SOLUTION INTRAVENOUS CONTINUOUS
Status: DISCONTINUED | OUTPATIENT
Start: 2022-09-19 | End: 2022-09-20 | Stop reason: HOSPADM

## 2022-09-19 RX ORDER — ACETAMINOPHEN 325 MG/1
975 TABLET ORAL ONCE
Status: COMPLETED | OUTPATIENT
Start: 2022-09-19 | End: 2022-09-19

## 2022-09-19 RX ORDER — ONDANSETRON 4 MG/1
4 TABLET, ORALLY DISINTEGRATING ORAL EVERY 30 MIN PRN
Status: DISCONTINUED | OUTPATIENT
Start: 2022-09-19 | End: 2022-09-19 | Stop reason: HOSPADM

## 2022-09-19 RX ORDER — HYDROXYZINE HYDROCHLORIDE 25 MG/1
25 TABLET, FILM COATED ORAL EVERY 6 HOURS PRN
Status: DISCONTINUED | OUTPATIENT
Start: 2022-09-19 | End: 2022-09-19 | Stop reason: HOSPADM

## 2022-09-19 RX ORDER — DIAZEPAM 5 MG
5 TABLET ORAL EVERY 6 HOURS PRN
Status: DISCONTINUED | OUTPATIENT
Start: 2022-09-19 | End: 2022-09-20 | Stop reason: HOSPADM

## 2022-09-19 RX ORDER — OXYCODONE HYDROCHLORIDE 5 MG/1
15 TABLET ORAL EVERY 4 HOURS PRN
Status: DISCONTINUED | OUTPATIENT
Start: 2022-09-19 | End: 2022-09-20 | Stop reason: HOSPADM

## 2022-09-19 RX ORDER — FENTANYL CITRATE 50 UG/ML
INJECTION, SOLUTION INTRAMUSCULAR; INTRAVENOUS PRN
Status: DISCONTINUED | OUTPATIENT
Start: 2022-09-19 | End: 2022-09-19

## 2022-09-19 RX ORDER — OXYCODONE HYDROCHLORIDE 5 MG/1
10 TABLET ORAL EVERY 4 HOURS PRN
Status: DISCONTINUED | OUTPATIENT
Start: 2022-09-19 | End: 2022-09-20 | Stop reason: HOSPADM

## 2022-09-19 RX ORDER — DIMENHYDRINATE 50 MG/ML
25 INJECTION, SOLUTION INTRAMUSCULAR; INTRAVENOUS
Status: DISCONTINUED | OUTPATIENT
Start: 2022-09-19 | End: 2022-09-19 | Stop reason: HOSPADM

## 2022-09-19 RX ORDER — LIDOCAINE 40 MG/G
CREAM TOPICAL
Status: DISCONTINUED | OUTPATIENT
Start: 2022-09-19 | End: 2022-09-20 | Stop reason: HOSPADM

## 2022-09-19 RX ORDER — LIDOCAINE HYDROCHLORIDE 20 MG/ML
INJECTION, SOLUTION INFILTRATION; PERINEURAL PRN
Status: DISCONTINUED | OUTPATIENT
Start: 2022-09-19 | End: 2022-09-19

## 2022-09-19 RX ORDER — ASPIRIN 81 MG/1
81 TABLET ORAL 2 TIMES DAILY
Status: DISCONTINUED | OUTPATIENT
Start: 2022-09-19 | End: 2022-09-20 | Stop reason: HOSPADM

## 2022-09-19 RX ORDER — ONDANSETRON 2 MG/ML
INJECTION INTRAMUSCULAR; INTRAVENOUS PRN
Status: DISCONTINUED | OUTPATIENT
Start: 2022-09-19 | End: 2022-09-19

## 2022-09-19 RX ORDER — MAGNESIUM HYDROXIDE 1200 MG/15ML
LIQUID ORAL PRN
Status: DISCONTINUED | OUTPATIENT
Start: 2022-09-19 | End: 2022-09-19 | Stop reason: HOSPADM

## 2022-09-19 RX ORDER — OXYCODONE HYDROCHLORIDE 5 MG/1
5 TABLET ORAL EVERY 4 HOURS PRN
Status: DISCONTINUED | OUTPATIENT
Start: 2022-09-19 | End: 2022-09-19 | Stop reason: HOSPADM

## 2022-09-19 RX ORDER — ACETAMINOPHEN 325 MG/1
975 TABLET ORAL EVERY 8 HOURS
Status: DISCONTINUED | OUTPATIENT
Start: 2022-09-19 | End: 2022-09-20 | Stop reason: HOSPADM

## 2022-09-19 RX ORDER — LABETALOL HYDROCHLORIDE 5 MG/ML
10 INJECTION, SOLUTION INTRAVENOUS
Status: DISCONTINUED | OUTPATIENT
Start: 2022-09-19 | End: 2022-09-19 | Stop reason: HOSPADM

## 2022-09-19 RX ORDER — BISACODYL 10 MG
10 SUPPOSITORY, RECTAL RECTAL DAILY PRN
Status: DISCONTINUED | OUTPATIENT
Start: 2022-09-19 | End: 2022-09-20 | Stop reason: HOSPADM

## 2022-09-19 RX ADMIN — SENNOSIDES AND DOCUSATE SODIUM 1 TABLET: 50; 8.6 TABLET ORAL at 21:57

## 2022-09-19 RX ADMIN — MIDAZOLAM HYDROCHLORIDE 2 MG: 1 INJECTION, SOLUTION INTRAMUSCULAR; INTRAVENOUS at 07:05

## 2022-09-19 RX ADMIN — OXYCODONE HYDROCHLORIDE 10 MG: 5 TABLET ORAL at 16:08

## 2022-09-19 RX ADMIN — ACETAMINOPHEN 975 MG: 325 TABLET, FILM COATED ORAL at 14:16

## 2022-09-19 RX ADMIN — ACETAMINOPHEN 975 MG: 325 TABLET, FILM COATED ORAL at 06:37

## 2022-09-19 RX ADMIN — ACETAMINOPHEN 975 MG: 325 TABLET, FILM COATED ORAL at 21:57

## 2022-09-19 RX ADMIN — SODIUM CHLORIDE, POTASSIUM CHLORIDE, SODIUM LACTATE AND CALCIUM CHLORIDE: 600; 310; 30; 20 INJECTION, SOLUTION INTRAVENOUS at 06:26

## 2022-09-19 RX ADMIN — LIDOCAINE HYDROCHLORIDE 60 MG: 20 INJECTION, SOLUTION INFILTRATION; PERINEURAL at 07:23

## 2022-09-19 RX ADMIN — OXYCODONE HYDROCHLORIDE 10 MG: 5 TABLET ORAL at 12:11

## 2022-09-19 RX ADMIN — DEXAMETHASONE SODIUM PHOSPHATE 4 MG: 4 INJECTION, SOLUTION INTRA-ARTICULAR; INTRALESIONAL; INTRAMUSCULAR; INTRAVENOUS; SOFT TISSUE at 07:23

## 2022-09-19 RX ADMIN — FENTANYL CITRATE 50 MCG: 50 INJECTION, SOLUTION INTRAMUSCULAR; INTRAVENOUS at 07:31

## 2022-09-19 RX ADMIN — PROPOFOL 200 MG: 10 INJECTION, EMULSION INTRAVENOUS at 07:23

## 2022-09-19 RX ADMIN — ONDANSETRON 4 MG: 2 INJECTION INTRAMUSCULAR; INTRAVENOUS at 08:03

## 2022-09-19 RX ADMIN — HYDROMORPHONE HYDROCHLORIDE 0.2 MG: 0.2 INJECTION, SOLUTION INTRAMUSCULAR; INTRAVENOUS; SUBCUTANEOUS at 08:57

## 2022-09-19 RX ADMIN — OXYCODONE HYDROCHLORIDE 10 MG: 5 TABLET ORAL at 20:35

## 2022-09-19 RX ADMIN — HYDROMORPHONE HYDROCHLORIDE 0.2 MG: 0.2 INJECTION, SOLUTION INTRAMUSCULAR; INTRAVENOUS; SUBCUTANEOUS at 08:49

## 2022-09-19 RX ADMIN — VANCOMYCIN HYDROCHLORIDE 1500 MG: 10 INJECTION, POWDER, LYOPHILIZED, FOR SOLUTION INTRAVENOUS at 06:34

## 2022-09-19 RX ADMIN — VANCOMYCIN HYDROCHLORIDE 1500 MG: 10 INJECTION, POWDER, LYOPHILIZED, FOR SOLUTION INTRAVENOUS at 18:12

## 2022-09-19 RX ADMIN — FENTANYL CITRATE 50 MCG: 50 INJECTION, SOLUTION INTRAMUSCULAR; INTRAVENOUS at 07:23

## 2022-09-19 RX ADMIN — ASPIRIN 81 MG: 81 TABLET, COATED ORAL at 11:05

## 2022-09-19 RX ADMIN — ASPIRIN 81 MG: 81 TABLET, COATED ORAL at 21:57

## 2022-09-19 ASSESSMENT — ACTIVITIES OF DAILY LIVING (ADL)
ADLS_ACUITY_SCORE: 20
ADLS_ACUITY_SCORE: 20
ADLS_ACUITY_SCORE: 21
ADLS_ACUITY_SCORE: 27
ADLS_ACUITY_SCORE: 21
ADLS_ACUITY_SCORE: 27

## 2022-09-19 ASSESSMENT — LIFESTYLE VARIABLES: TOBACCO_USE: 1

## 2022-09-19 NOTE — ANESTHESIA CARE TRANSFER NOTE
Patient: Ai Boyer    Procedure: Procedure(s):  TIBIOTALOCALCANEAL FUSION LEFT ANKLE       Diagnosis: Dropfoot [M21.379]  Diagnosis Additional Information: No value filed.    Anesthesia Type:   General     Note:    Oropharynx: oropharynx clear of all foreign objects and spontaneously breathing  Level of Consciousness: drowsy  Oxygen Supplementation: face mask  Level of Supplemental Oxygen (L/min / FiO2): 6  Independent Airway: airway patency satisfactory and stable    Vital Signs Stable: post-procedure vital signs reviewed and stable  Report to RN Given: handoff report given  Patient transferred to: Phase II    Handoff Report: Identifed the Patient, Identified the Reponsible Provider, Reviewed the pertinent medical history, Discussed the surgical course, Reviewed Intra-OP anesthesia mangement and issues during anesthesia, Set expectations for post-procedure period and Allowed opportunity for questions and acknowledgement of understanding      Vitals:  Vitals Value Taken Time   /71    Temp     Pulse 73 09/19/22 0829   Resp 16 09/19/22 0829   SpO2 100 % 09/19/22 0829   Vitals shown include unvalidated device data.    Electronically Signed By: ROXANNA Hunt CRNA  September 19, 2022  8:30 AM

## 2022-09-19 NOTE — ANESTHESIA PREPROCEDURE EVALUATION
Anesthesia Pre-Procedure Evaluation    Patient: Ai Boyer   MRN: [id number] : 1960        Procedure : Procedure(s):  TIBIOTALOCALCANEAL FUSION LEFT ANKLE          Past Medical History:   Diagnosis Date     Arthritis      At risk for healthcare associated infection 2019     Foot drop, bilateral      IBS (irritable bowel syndrome)      Obese      Peripheral neuropathy       Past Surgical History:   Procedure Laterality Date      knee arthroscopy  Left     meniscus repair     ARTHRODESIS ANKLE  2012    Procedure:ARTHRODESIS ANKLE; right ankle medial closing wedge ostetomy intermedually nailing removal of hardware; Surgeon:HUMA GARRETT; Location: [facility]     ARTHROPLASTY REVISION HIP Left 2019    Procedure: Revision left total hip arthroplasty;  Surgeon: More Stephens MD;  Location: [facility]      SECTION       GYN SURGERY      oopherectomy     ORTHOPEDIC SURGERY Left 2009    left hip      right ankle fusion  Right       Allergies   Allergen Reactions     Sporanox [Itraconazole] Anaphylaxis     Amoxicillin Itching     Ceftin Hives     Clindamycin Hcl Itching     Codeine Sulfate Nausea     Lisinopril Cough     Morphine Hcl Hives     Hydrocodone-Acetaminophen Rash     Keflex [Cephalexin Hcl] Rash      Social History     Tobacco Use     Smoking status: Current Every Day Smoker     Packs/day: 1.00     Types: Cigarettes     Smokeless tobacco: Never Used     Tobacco comment: 1-pack daily    Substance Use Topics     Alcohol use: Yes     Comment: 1 - 4 times per week      Wt Readings from Last 1 Encounters:   22 108.6 kg (239 lb 8 oz)        Anesthesia Evaluation   Pt has had prior anesthetic.     No history of anesthetic complications       ROS/MED HX  ENT/Pulmonary:     (+) tobacco use, Current use, 22  Pack-Year Hx,      Neurologic: Comment: Bilateral foot drop    (+) peripheral neuropathy,     Cardiovascular:       METS/Exercise Tolerance:     Hematologic:        Musculoskeletal:   (+) arthritis,     GI/Hepatic: Comment: IBS      Renal/Genitourinary:       Endo:     (+) Obesity (Class 2),     Psychiatric/Substance Use:     (+) psychiatric history depression     Infectious Disease:       Malignancy:       Other:               OUTSIDE LABS:  CBC:   Lab Results   Component Value Date    WBC 4.5 12/02/2021    WBC 6.4 12/04/2018    HGB 15.3 12/02/2021    HGB 11.0 (L) 01/19/2019    HCT 46.5 12/02/2021    HCT 47.0 12/04/2018     12/02/2021     12/04/2018     BMP:   Lab Results   Component Value Date     12/02/2021     05/22/2009    POTASSIUM 3.4 12/02/2021    POTASSIUM 4.0 05/22/2009    CHLORIDE 110 (H) 12/02/2021    CHLORIDE 106 05/22/2009    CO2 28 12/02/2021    CO2 27 05/22/2009    BUN 13 12/02/2021    BUN 16 05/21/2009    CR 0.88 12/02/2021    CR 0.71 05/21/2009     (H) 12/02/2021     (H) 01/18/2019     COAGS:   Lab Results   Component Value Date    PTT 25 05/21/2009    INR 2.00 (H) 05/24/2009     POC: No results found for: BGM, HCG, HCGS  HEPATIC:   Lab Results   Component Value Date    ALBUMIN 3.8 12/02/2021    PROTTOTAL 6.6 (L) 12/02/2021    ALT 34 12/02/2021    AST 21 12/02/2021    ALKPHOS 75 12/02/2021    BILITOTAL 0.2 12/02/2021     OTHER:   Lab Results   Component Value Date    ANNI 9.4 12/02/2021    TSH 1.28 12/02/2021    CRP <2.9 11/13/2018    SED 3 11/13/2018       Anesthesia Plan    ASA Status:  2   NPO Status:  NPO Appropriate    Anesthesia Type: General.     - Airway: LMA   Induction: Intravenous.   Maintenance: Balanced.        Consents    Anesthesia Plan(s) and associated risks, benefits, and realistic alternatives discussed. Questions answered and patient/representative(s) expressed understanding.    - Discussed:     - Discussed with:  Patient         Postoperative Care    Pain management: IV analgesics, Oral pain medications, Peripheral nerve block (Single Shot), Multi-modal analgesia.   PONV prophylaxis: Ondansetron (or  other 5HT-3), Dexamethasone or Solumedrol     Comments:                Francis Carvalho MD

## 2022-09-19 NOTE — ANESTHESIA PROCEDURE NOTES
Airway       Patient location during procedure: OR  Staff -        Anesthesiologist:  Francis Carvalho MD       CRNA: Genie Phipps APRN CRNA       Other Anesthesia Staff: Neal Cr       Performed By: SRNA  Consent for Airway        Urgency: elective  Indications and Patient Condition       Indications for airway management: anaya-procedural       Induction type:intravenous       Mask difficulty assessment: 0 - not attempted    Final Airway Details       Final airway type: supraglottic airway    Supraglottic Airway Details        Type: LMA       Brand: Ambu AuraGain       LMA size: 5    Post intubation assessment        Placement verified by: capnometry, equal breath sounds and chest rise        Number of attempts at approach: 1       Secured with: silk tape       Ease of procedure: easy       Dentition: Intact and Unchanged

## 2022-09-19 NOTE — OP NOTE
Procedure Date: 09/19/2022    PREOPERATIVE DIAGNOSES:     1.  Flaccid paralysis, left ankle, secondary to Charcot-Chyna-Tooth disease.  2.  Large exostosis over the first tarsometatarsal joint, left foot.  3.  Large exostosis over the left great toe metatarsophalangeal joint.    POSTOPERATIVE DIAGNOSES:   1.  Flaccid paralysis, left ankle, secondary to Charcot-Chyna-Tooth disease.  2.  Large exostosis over the first tarsometatarsal joint, left foot.  3.  Large exostosis over the left great toe metatarsophalangeal joint.    SURGICAL PROCEDURES:     1.  Tibiotalar calcaneal fusion using a GetOutfitted T2 intramedullary osman.  2.  Removal of exostosis from the left first tarsometatarsal joint, as well as the left first metatarsophalangeal joint.    ANESTHETIC:  General.    SURGEON:  Jose Daniel Dalal MD    ASSISTANT:  CASEY Campbell    NEED FOR ASSISTANCE:  A skilled first assistant was necessary throughout all portions of the case in order to assist with patient positioning, retracting, wound closure, dressing and splint application.       DESCRIPTION OF PROCEDURE:  After adequate induction of a general anesthetic, the patient was positioned supine on the operating table.  The left leg was sterilely prepped and free-draped in the usual fashion.  Tourniquet around the thigh was inflated to 300 mmHg.    A 6 cm anterior incision was made over the ankle.  The interval between the extensor hallucis longus and tibialis anterior was used to expose the joint.  Power osteotomes were then used to remove the articular cartilage from the joint.  Subchondral bone was exposed and both sides of the joint.    A separate incision was made in the sinus tarsi.  The subtalar joint was exposed.  Power osteotomes were again used to remove the articular cartilage from the opposing surfaces.    This was followed by a 3 cm incision plantar over the heel.  Under x-ray control, a guidewire was inserted through the calcaneus, subtalar joint, talus  and tibiotalar joint into the tibia.  This was then over-reamed to a 13 mm reamer.  150 x 12 Marilyn T2 intramedullary osman was inserted.  Interlocking screws under compression were inserted and the calcaneus, talus and the tibia.  Excellent alignment was obtained with good compression across the joints.    This was followed by a longitudinal incision over the first tarsometatarsal joint, left foot.  The neurovascular structures were protected.  An osteotome was used to do a generous cheilectomy over the TMT joint.    A separate incision was made over the dorsum of the first metatarsophalangeal joint.  There was a large dorsal exostosis that was removed with an osteotome.    The tourniquet was deflated.  Hemostasis obtained.  The wounds were closed in layers.  A sterile dressing and a light compressive bandage applied.  She tolerated the procedure well and was taken to the recovery room in satisfactory condition after application of a short leg cast.  She can ambulate partial weightbearing with crutches.  Sutures will be removed in 2 weeks.    Jose Daniel Dalal MD        D: 2022   T: 2022   MT: BOOKER    Name:     LISBET KRISHNAMURTHY ROLAND  MRN:      [id number]        Account:        [id number]   :      1960           Procedure Date: 2022     Document: [id number]

## 2022-09-19 NOTE — ANESTHESIA PROCEDURE NOTES
Adductor canal (targeting saphenous nerve) Procedure Note    Pre-Procedure   Staff -        Anesthesiologist:  Francis Carvalho MD       Performed By: anesthesiologist       Location: pre-op       Pre-Anesthestic Checklist: patient identified, IV checked, site marked, risks and benefits discussed, informed consent, monitors and equipment checked, pre-op evaluation, at physician/surgeon's request and post-op pain management  Timeout:       Correct Patient: Yes        Correct Procedure: Yes        Correct Site: Yes        Correct Position: Yes        Correct Laterality: Yes        Site Marked: Yes  Procedure Documentation  Procedure: Adductor canal (targeting saphenous nerve)       Patient Position: supine       Skin prep: Chloraprep       Local skin infiltrated with 1 mL of 1% lidocaine.        Needle Type: insulated       Needle Gauge: 21.        Needle Length (millimeters): 90        Ultrasound guided       1. Ultrasound was used to identify targeted nerve, plexus, vascular marker, or fascial plane and place a needle adjacent to it in real-time.       2. Ultrasound was used to visualize the spread of anesthetic in close proximity to the above referenced structure.       3. A permanent image is entered into the patient's record.       4. The visualized anatomic structures appeared normal.       5. There were no apparent abnormal pathologic findings.    Assessment/Narrative         The placement was negative for: blood aspirated, painful injection and site bleeding       Paresthesias: No.       Bolus given via needle..        Secured via.        Insertion/Infusion Method: Single Shot       Complications: none     Comments:  Bolus via needle, 10 mL of 0.5% ropivacaine with 1:400,000 epinephrine.    Under ultrasound guidance, a 21 gauge needle was inserted and placed in close proximity to the saphenous nerve. Ultrasound was also used to visualize the spread of anesthetic in close proximity to the nerve being  blocked. The nerve appeared anatomically normal, and there were no apparent abnormal pathological findings. Patient tolerated well, was mildly sedated but communicative throughout the procedure. A permanent ultrasound image was saved in the patient's record.    The surgeon has given a verbal order transferring care of this patient to me for the performance of regional analgesia block for post op pain control. It is requested of me because I am uniquely trained and qualified to perform this block and the surgeon is neither trained nor qualified to perform this procedure.

## 2022-09-19 NOTE — PLAN OF CARE
Goal Outcome Evaluation:   Patient vital signs are at baseline: Yes  Patient able to ambulate as they were prior to admission or with assist devices provided by therapies during their stay:  Yes  Patient MUST void prior to discharge:  Yes  Patient able to tolerate oral intake:  Yes  Pain has adequate pain control using Oral analgesics:  Yes  Does patient have an identified :  Yes  Has goal D/C date and time been discussed with patient:  Yes   A/OX4, cms intact, baseline BLE foot drop. Oxycodone given for pain with adequate pain control. Left ankle dreg has moderate drainage, elevated on wedge foam. IV SL.Voiding well per BSC. Good appetite. Home tomorrow. Will continue to monitor.

## 2022-09-19 NOTE — INTERVAL H&P NOTE
"I have reviewed the surgical (or preoperative) H&P that is linked to this encounter, and examined the patient. There are no significant changes    Clinical Conditions Present on Arrival:  Clinically Significant Risk Factors Present on Admission                   # Obesity: Estimated body mass index is 34.36 kg/m  as calculated from the following:    Height as of this encounter: 1.778 m (5' 10\").    Weight as of this encounter: 108.6 kg (239 lb 8 oz).       "

## 2022-09-19 NOTE — ANESTHESIA POSTPROCEDURE EVALUATION
Patient: Ai Boyer    Procedure: Procedure(s):  TIBIOTALOCALCANEAL FUSION LEFT ANKLE       Anesthesia Type:  General    Note:     Postop Pain Control: Uneventful            Sign Out: Well controlled pain   PONV: No   Neuro/Psych: Uneventful            Sign Out: Acceptable/Baseline neuro status   Airway/Respiratory: Uneventful            Sign Out: Acceptable/Baseline resp. status   CV/Hemodynamics: Uneventful            Sign Out: Acceptable CV status   Other NRE: NONE   DID A NON-ROUTINE EVENT OCCUR? No           Last vitals:  Vitals Value Taken Time   /77 09/19/22 0930   Temp 36.2  C (97.1  F) 09/19/22 0945   Pulse 68 09/19/22 0943   Resp 29 09/19/22 0943   SpO2 97 % 09/19/22 0947   Vitals shown include unvalidated device data.    Electronically Signed By: Francis Carvalho MD  September 19, 2022  12:34 PM

## 2022-09-19 NOTE — ANESTHESIA PROCEDURE NOTES
Popliteal and Sciatic Procedure Note    Pre-Procedure   Staff -        Anesthesiologist:  Francis Carvalho MD       Performed By: anesthesiologist       Location: pre-op       Pre-Anesthestic Checklist: patient identified, IV checked, site marked, risks and benefits discussed, informed consent, monitors and equipment checked, pre-op evaluation, at physician/surgeon's request and post-op pain management  Timeout:       Correct Patient: Yes        Correct Procedure: Yes        Correct Site: Yes        Correct Position: Yes        Correct Laterality: Yes        Site Marked: Yes  Procedure Documentation  Procedure: Popliteal, Sciatic       Patient Position: supine       Skin prep: Chloraprep       Local skin infiltrated with 1 mL of 1% lidocaine.        Needle Type: insulated       Needle Gauge: 21.        Needle Length (millimeters): 100        Ultrasound guided       1. Ultrasound was used to identify targeted nerve, plexus, vascular marker, or fascial plane and place a needle adjacent to it in real-time.       2. Ultrasound was used to visualize the spread of anesthetic in close proximity to the above referenced structure.       3. A permanent image is entered into the patient's record.       4. The visualized anatomic structures appeared normal.       5. There were no apparent abnormal pathologic findings.    Assessment/Narrative         The placement was negative for: blood aspirated, painful injection and site bleeding       Paresthesias: No.       Bolus given via needle..        Secured via.        Insertion/Infusion Method: Single Shot       Complications: none     Comments:  Bolus via needle, 20 mL of 0.5% ropivacaine with 1:400,000 epinephrine.    Under ultrasound guidance, a 21 gauge needle was inserted and placed in close proximity to the sciatic nerve. Ultrasound was also used to visualize the spread of anesthetic in close proximity to the nerve being blocked. The nerve appeared anatomically normal,  and there were no apparent abnormal pathological findings. Patient tolerated well, was mildly sedated but communicative throughout the procedure. A permanent ultrasound image was saved in the patient's record.    The surgeon has given a verbal order transferring care of this patient to me for the performance of regional analgesia block for post op pain control. It is requested of me because I am uniquely trained and qualified to perform this block and the surgeon is neither trained nor qualified to perform this procedure.

## 2022-09-20 ENCOUNTER — APPOINTMENT (OUTPATIENT)
Dept: PHYSICAL THERAPY | Facility: CLINIC | Age: 62
End: 2022-09-20
Attending: ORTHOPAEDIC SURGERY
Payer: COMMERCIAL

## 2022-09-20 VITALS
HEIGHT: 70 IN | WEIGHT: 239.5 LBS | BODY MASS INDEX: 34.29 KG/M2 | TEMPERATURE: 98.4 F | SYSTOLIC BLOOD PRESSURE: 109 MMHG | RESPIRATION RATE: 16 BRPM | DIASTOLIC BLOOD PRESSURE: 59 MMHG | HEART RATE: 71 BPM | OXYGEN SATURATION: 95 %

## 2022-09-20 LAB
FASTING STATUS PATIENT QL REPORTED: NO
GLUCOSE BLD-MCNC: 125 MG/DL (ref 70–99)
HGB BLD-MCNC: 13.4 G/DL (ref 11.7–15.7)

## 2022-09-20 PROCEDURE — 97161 PT EVAL LOW COMPLEX 20 MIN: CPT | Mod: GP | Performed by: PHYSICAL THERAPIST

## 2022-09-20 PROCEDURE — 97530 THERAPEUTIC ACTIVITIES: CPT | Mod: GP | Performed by: PHYSICAL THERAPIST

## 2022-09-20 PROCEDURE — 85018 HEMOGLOBIN: CPT | Performed by: PHYSICIAN ASSISTANT

## 2022-09-20 PROCEDURE — 250N000013 HC RX MED GY IP 250 OP 250 PS 637: Performed by: PHYSICIAN ASSISTANT

## 2022-09-20 PROCEDURE — 97116 GAIT TRAINING THERAPY: CPT | Mod: GP | Performed by: PHYSICAL THERAPIST

## 2022-09-20 PROCEDURE — 36415 COLL VENOUS BLD VENIPUNCTURE: CPT | Performed by: PHYSICIAN ASSISTANT

## 2022-09-20 PROCEDURE — 82947 ASSAY GLUCOSE BLOOD QUANT: CPT | Performed by: ORTHOPAEDIC SURGERY

## 2022-09-20 RX ORDER — AMOXICILLIN 250 MG
1 CAPSULE ORAL 2 TIMES DAILY
Qty: 40 TABLET | Refills: 1 | Status: SHIPPED | OUTPATIENT
Start: 2022-09-20 | End: 2022-10-25

## 2022-09-20 RX ORDER — ONDANSETRON 4 MG/1
4 TABLET, ORALLY DISINTEGRATING ORAL EVERY 6 HOURS PRN
Qty: 20 TABLET | Refills: 1 | Status: SHIPPED | OUTPATIENT
Start: 2022-09-20 | End: 2022-10-25

## 2022-09-20 RX ORDER — HYDROMORPHONE HYDROCHLORIDE 4 MG/1
4 TABLET ORAL EVERY 4 HOURS PRN
Qty: 40 TABLET | Refills: 0 | Status: SHIPPED | OUTPATIENT
Start: 2022-09-20 | End: 2022-10-25

## 2022-09-20 RX ORDER — HYDROMORPHONE HYDROCHLORIDE 2 MG/1
4 TABLET ORAL EVERY 4 HOURS PRN
Status: DISCONTINUED | OUTPATIENT
Start: 2022-09-20 | End: 2022-09-20 | Stop reason: HOSPADM

## 2022-09-20 RX ADMIN — ACETAMINOPHEN 975 MG: 325 TABLET, FILM COATED ORAL at 05:32

## 2022-09-20 RX ADMIN — OXYCODONE HYDROCHLORIDE 10 MG: 5 TABLET ORAL at 00:45

## 2022-09-20 RX ADMIN — ASPIRIN 81 MG: 81 TABLET, COATED ORAL at 08:51

## 2022-09-20 RX ADMIN — OXYCODONE HYDROCHLORIDE 10 MG: 5 TABLET ORAL at 05:32

## 2022-09-20 RX ADMIN — OXYCODONE HYDROCHLORIDE 10 MG: 5 TABLET ORAL at 10:10

## 2022-09-20 ASSESSMENT — ACTIVITIES OF DAILY LIVING (ADL)
ADLS_ACUITY_SCORE: 27

## 2022-09-20 NOTE — PLAN OF CARE
Patient vital signs are at baseline: Yes  Patient able to ambulate as they were prior to admission or with assist devices provided by therapies during their stay:  Yes  Patient MUST void prior to discharge:  Yes  Patient able to tolerate oral intake:  Yes  Pain has adequate pain control using Oral analgesics:  Yes  Does patient have an identified :  Yes  Has goal D/C date and time been discussed with patient:  Yes  Baseline neuropathy. Moderate to large amount of drainage, dressing reinforced yesterday. No IV access

## 2022-09-20 NOTE — PROGRESS NOTES
Med rec not completed, patient wants to leave. Called Provider on [phone] given by the clinic, unable to reach. AVS printed.

## 2022-09-20 NOTE — PROVIDER NOTIFICATION
Updated vikas Serrano PA regarding med rec not complete. Patient wants to discharge home early. Zach will complete med rec, updated merary [place]AP[place]TON.

## 2022-09-20 NOTE — PROGRESS NOTES
09/20/22 0930   Quick Adds   Type of Visit Initial PT Evaluation   Living Environment   People in Home significant other   Current Living Arrangements house   Home Accessibility stairs to enter home;stairs within home  (Stairs within home that she will not need to navigate)   Number of Stairs, Main Entrance 3   Stair Railings, Main Entrance railings on both sides of stairs   Transportation Anticipated car, drives self;family or friend will provide  (Daughter and boyfriend can help)   Living Environment Comments Boyfriend can physically assist, 24/7   Self-Care   Usual Activity Tolerance good   Current Activity Tolerance moderate   Regular Exercise No   Equipment Currently Used at Home none   Fall history within last six months yes   Number of times patient has fallen within last six months 18   Activity/Exercise/Self-Care Comment Pt has walker, crutches. picking up knee scooter after discharge   General Information   Onset of Illness/Injury or Date of Surgery 09/19/22   Referring Physician Jsoe Daniel Dalal MD   Patient/Family Therapy Goals Statement (PT) to return home   Pertinent History of Current Problem (include personal factors and/or comorbidities that impact the POC) Pt is 61 yo female POD #1 left tibiocalcaneal fusion. PMH, per chart, includes: arthritis, peripheral neuropathy with B LE foot drop, IBS, tobacco user, history of depression, and obesity.   Existing Precautions/Restrictions fall   Weight-Bearing Status - LLE other (see comments)  (Order for foot flat- pt having a lot of bleeding and reported PA instructed only left foot flat for balance to avoid weightbearing for 2 weeks.)   General Observations Pt supine in bed upon arrival of therapist and agreeable to PT.   Cognition   Affect/Mental Status (Cognition) WFL   Orientation Status (Cognition) oriented x 4   Follows Commands (Cognition) WFL   Pain Assessment   Patient Currently in Pain   (4-5/10 R ankle pain at rest)   Integumentary/Edema    Integumentary/Edema Comments Bandage over incision, intact   Posture    Posture Forward head position;Kyphosis   Posture Comments Forward flexed posture standing at walker   Range of Motion (ROM)   ROM Comment B hip and knee ROM WFL. Limited B ankle ROM d/t B ankle fusions.   Strength (Manual Muscle Testing)   Strength Comments B LE grossly 3/5, pt able to peform RLE and LLE LAQ.   Bed Mobility   Comment, (Bed Mobility) Pt performed supine-sit, mod I.   Transfers   Comment, (Transfers) Pt performed sit<>stand with FWW and CGA.   Gait/Stairs (Locomotion)   Distance in Feet (Required for LE Total Joints) 5'x3, 10', stairs   Comment, (Gait/Stairs) Pt able to ambulate 5' with FWW and CGA while maintaining touch-down WB of LLE.   Balance   Balance Comments Noted good seated and standing balance at FWW.   Sensory Examination   Sensory Perception Comments B LE numbness and tingling, pt reported this at baseline   Clinical Impression   Criteria for Skilled Therapeutic Intervention Yes, treatment indicated   PT Diagnosis (PT) Difficulty with functional mobility   Influenced by the following impairments Pain, WB status, weakness, decreased activity tolerance, decreased sensation and balance   Functional limitations due to impairments Difficulty with functional mobility requiring AD and Assist   Clinical Presentation (PT Evaluation Complexity) Stable/Uncomplicated   Clinical Presentation Rationale Based on current presentation, PMH, and social support.   Clinical Decision Making (Complexity) low complexity   Planned Therapy Interventions (PT) bed mobility training;gait training;transfer training;stair training   Risk & Benefits of therapy have been explained patient   PT Discharge Planning   PT Rationale for DC Rec PT: Pt currently below baseline with all functional mobility requiring AD and assist. Pt is able to ambulate short distances with FWW and CGA while maintaining foot flat WB status. Pt is able to navigate 3 stairs  with 1 railing and 1 crutch with CGA. Pt safe with mobility and no LOB noted. Pt has good, 24/7 support at home from her boyfriend who can physically assist if needed. Anticipate at discharge pt would require CGA with sit<>stands, ambulation, and stairs.   PT Brief overview of current status Mod I bed mobility, CGA for Sit<>stands, ambulation, and stairs   Plan of Care Review   Plan of Care Reviewed With patient   Total Evaluation Time   Total Evaluation Time (Minutes) 8   Physical Therapy Goals   PT Frequency Daily   PT Predicted Duration/Target Date for Goal Attainment 09/20/22   PT Goals Bed Mobility;Transfers;Gait;Stairs   PT: Bed Mobility Modified independent;Supine to/from sit   PT: Transfers Supervision/stand-by assist;Sit to/from stand;Assistive device   PT: Gait Supervision/stand-by assist;Rolling walker;50 feet   PT: Stairs 3 stairs;Supervision/stand-by assist;Assistive device

## 2022-09-20 NOTE — PLAN OF CARE
Physical Therapy Discharge Summary    Reason for therapy discharge:    Discharged to home with boyfriend    Progress towards therapy goal(s). See goals on Care Plan in [facility] electronic health record for goal details.  Goals partially met.  Barriers to achieving goals:   discharge from facility.    Therapy recommendation(s):    No further therapy is recommended.       8

## 2022-09-20 NOTE — PLAN OF CARE
Shift Note: [id number]  POD 0 L ankle fusion. Pt A&Ox4, cooperative. VSS on RA, CMS intact ex motion of toes. Pain managed w/ tylenol and oxycodone. L ankle dressing saturated, reinforced & elevated. Continent, voiding in bedpan. Regular diet, tolerating. PT to see, then likely discharge tomorrow morning.

## 2022-09-20 NOTE — PROGRESS NOTES
Patient vital signs are at baseline: Yes  Patient able to ambulate as they were prior to admission or with assist devices provided by therapies during their stay:  Yes  Patient MUST void prior to discharge:  Yes  Patient able to tolerate oral intake:  Yes  Pain has adequate pain control using Oral analgesics:  Yes  Does patient have an identified :  Yes  Has goal D/C date and time been discussed with patient:  Yes     A&Ox4, VSS on RA. PT completed. Left leg casted, moderate drainage noted on ACE Wrap under in ankle,  provider aware. Educations, instructions form given to patient.

## 2022-10-01 ENCOUNTER — MYC MEDICAL ADVICE (OUTPATIENT)
Dept: NEUROLOGY | Facility: CLINIC | Age: 62
End: 2022-10-01

## 2022-10-10 ENCOUNTER — HEALTH MAINTENANCE LETTER (OUTPATIENT)
Age: 62
End: 2022-10-10

## 2022-10-24 ENCOUNTER — HOSPITAL ENCOUNTER (OUTPATIENT)
Dept: ULTRASOUND IMAGING | Facility: CLINIC | Age: 62
Discharge: HOME | End: 2022-10-24
Attending: STUDENT IN AN ORGANIZED HEALTH CARE EDUCATION/TRAINING PROGRAM | Admitting: STUDENT IN AN ORGANIZED HEALTH CARE EDUCATION/TRAINING PROGRAM
Payer: COMMERCIAL

## 2022-10-24 DIAGNOSIS — R20.9 SENSATION OF COLD IN LEG: ICD-10-CM

## 2022-10-24 DIAGNOSIS — R20.0 BILATERAL LEG NUMBNESS: ICD-10-CM

## 2022-10-24 PROCEDURE — 93922 UPR/L XTREMITY ART 2 LEVELS: CPT

## 2022-10-25 ENCOUNTER — VIRTUAL VISIT (OUTPATIENT)
Dept: NEUROLOGY | Facility: CLINIC | Age: 62
End: 2022-10-25
Payer: COMMERCIAL

## 2022-10-25 DIAGNOSIS — G60.9 IDIOPATHIC NEUROPATHY: Primary | ICD-10-CM

## 2022-10-25 PROCEDURE — 99212 OFFICE O/P EST SF 10 MIN: CPT | Mod: 95 | Performed by: PSYCHIATRY & NEUROLOGY

## 2022-10-25 NOTE — Clinical Note
Renay - please see my note and comment. Patient says she has met her deductible for the year, but I don't know if that applies to this question. Thanks.

## 2022-10-25 NOTE — PROGRESS NOTES
Ai is a 62 year old who is being evaluated via a billable telephone visit.      What phone number would you like to be contacted at? [phone]  How would you like to obtain your AVS? MyChart  Phone call duration:  15 Minutes  SEPIDEH Ansari, SUSANNE ([facility])

## 2022-10-25 NOTE — PROGRESS NOTES
Ms Boyer had good questions about evaluation, etiology, and classification of her neuropathy. I indicated we could expand the genetic testing although the yield is low or indeterminate. I will ask our genetic counselor to estimate out of pocket costs for an exome and for SORD testing.

## 2022-10-25 NOTE — LETTER
10/25/2022         RE: Ai Boyer  [address]  [facility] [address]        Dear Colleague,    Thank you for referring your patient, Ai Boyer, to the [facility] [place]. Please see a copy of my visit note below.    Ai is a 62 year old who is being evaluated via a billable telephone visit.      What phone number would you like to be contacted at? [phone]  How would you like to obtain your AVS? [facility]  Phone call duration:  15 Minutes  SEPIDEH Ansari, SUSANNE ([facility])      Ms Boyer had good questions about evaluation, etiology, and classification of her neuropathy. I indicated we could expand the genetic testing although the yield is low or indeterminate. I will ask our genetic counselor to estimate out of pocket costs for an exome and for SORD testing.      Again, thank you for allowing me to participate in the care of your patient.        Sincerely,        Adeel Verde MD

## 2023-01-02 NOTE — PROGRESS NOTES
Ai Boyer was seen for a genetic counseling appointment at the request of Dr. Verde today given her diagnosis of neuropathy.      Pertinent Medical History: Ai is a 61 year old female with a history of neuropathy. She was previously seen by Dr Verde in the late 1990s related to this diagnosis but has not been followed for neuropathy for many years. Ai reports a history of multiple surgeries on her right ankle and bilateral hip replacement. It is unclear whether the neuropathy in her feet occurred due to her surgical history or other unidentified causes. Ai reports that when she saw Dr Verde her name was Ai Washburn and that at this time she had multiple EMGs and some genetic testing. 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 Ai underwent a comprehensive neuropathy panel at [facility] that was negative or normal. See Dr. Verde's note for additional details.      Family History: A three generation pedigree was obtained at Ai's previous appointment and scanned into the EMR. This family history is by patient report only and has not been verified with medical records except where noted. The following information is significant:     Ai has 2 fraternal twin daughters (age 30).  One of her daughters has a history of removal of half of her thyroid due to a tumor and an ovarian cyst.  She also has a history of 1 miscarriage which occurred at 6 weeks gestation.  Ai's other daughter has a history of anxiety and hair thinning that may be due to this anxiety.    Ai has 1 brother (age 62) is a history of heart problems that are most likely due to an infection.  These heart problems include atrial and ventricular fibrillation requiring a pacemaker and a defibrillator.  He also has a history of a stroke and left foot drop that began after his stroke.  He has 2 healthy sons (ages 33 and 29) and one  "healthy daughter (age 31).  His son (age 29) has 1 healthy son (age 2) and his wife is currently pregnant.  Ai has 1 sister (age 53) who has a history of multiple bunion surgeries.  She has tingling in her feet that are thought to be due to the surgeries.  She does not see a doctor regularly.  She has 1 healthy daughter (age 21) and one healthy son (age 19).    Ai's father  at age 74 and had a history of heart problems including multiple valve replacements.  He also had a history of a stroke and high blood pressure.  His siblings, nieces and nephews have no history of neuropathy or nerve damage.  Ai's paternal grandfather  due to suicide and had a history of Wayne's disease.  Ai's paternal grandmother  at age 37 due to asthma.  Ai has 1 uncle who is granddaughter has a history of atypical facial features, intellectual disability, above average height and delayed development.  The family suspects that she is \"vaccine injured\".  Paternal ancestry is English, Kat, Japanese and Kinyarwanda.    Ai's mother  at age 69 and had a history of multiple sclerosis that was diagnosed after the birth of her last daughter and history of an ovarian cyst.  Her siblings, parents and nieces and nephews have no history of neuropathy or symptoms of neuropathy.  Maternal ancestry is Pakistani and Salvadorean.    Consanguinity was denied.    Discussion: We reviewed that the results of Ai's comprehensive neuropathy panel were negative or normal. This testing covered many of the more common genetic causes of neuropathy but not all of them. Additional genetic testing options are low yield but include SORD testing and whole exome sequencing.    Our genes are sequences of letters that provide instructions that help our body grow, develop and function. Sometimes a change occurs in one of our genes that causes the body to be unable to read these instructions. This results in a genetic condition.    Given that " Ai's previous genetic testing has failed to identify a clear cause of her challenges, whole exome sequencing is the next best diagnostic step.     Whole exome sequencing is the largest genetic test that is currently available in our clinic. This test looks for variants or changes in almost all of the genes (~20,000).     There are three possible results for this testing:    o Positive: A positive result indicates that a genetic variant has been identified that explains the cause of Ai s symptoms. A positive result may provide information on prognosis and other symptoms related to the genetic change. It may also help guide medical management for Ai and will provide information to other family members regarding their risk.   o Negative: A negative result indicates that a disease causing genetic variant was not identified  o Variant of uncertain significance (VUS): A VUS is an uncertain result that indicates a genetic change was identified, but it is currently unknown if that change is associated with a genetic disorder.     Whole exome sequencing can also provide information regarding certain conditions that are unrelated to the reason we are doing the testing today. These are called secondary findings. Currently, there is a list of over 72 genes that are considered medically actionable meaning if we know there is a change in these genes there is something we can change in a person's medical management to help keep them healthy.     Risks, benefits and limitations for whole exome sequencing was reviewed. Positive results in this genetic test could provide important information related to Ai's health and may have implications for her medical management.     One more recently identified cause of axonal neuropathy is variants in the SORD gene. This gene is technically difficult to sequence with traditional genetic testing methodologies and for this reason, cannot be detected by whole exome sequencing in  most cases.    SORD is a gene that provides instructions that allow our body to build an enzyme called sorbitol dehydrogenase. Sorbitol dehydrogenase is responsible for converting sorbitol to fructose, a sugar used by the body for energy. Complete loss of sorbitol dehydrogenase activity due to a variant or change in the SORD gene leads to a build up of sorbitol in the cells. This results in nerve damage or neuropathy that is slowly progressive.    Disease causing variants or mutations that result in loss of the SORD gene function affect the peripheral nerves. Our peripheral nerves carry messages from our brain and our spine to the rest of our body and back. When these nerves are not working properly, this can lead to symptoms in the motor and sensory systems, especially in the parts of the body that are farthest away from the brain and spine.     Each nerve has two parts, the covering around the nerve called the myelin and the central part of the nerve called the axon. The myelin controls the speed at which the message is sent. The axon controls the strength of that message. SORD mutations lead to damage to the axon in two types of the nerves, the motor nerves (involved in muscle movement) and the sensory nerves (involved in sensation or feeling). Damage to motor nerves occurs in all individuals with SORD deficiency and causes muscle weakness and difficulty with muscle movement, especially in the hands and feet. This can lead to difficulty walking, writing, putting on jewelry and many other types of movement. Damage to the sensory nerves occurs in roughly half of individuals with SORD neuropathy and can cause numbness, tingling and impaired balance, which can lead to fatigue.    The symptoms of SORD neuropathy most often begin in the teenage years or twenties but can develop later in life. The symptoms of this condition and severity of these symptoms are variable. However, delayed motor milestones in infancy and  childhood are uncommon. The majority of individuals with SORD neuropathy have muscle weakness in the distal limbs. Muscles closer to the center of the body (proximal muscles) are generally not affected. Additional symptoms may include scoliosis and/or hearing loss.     SORD neuropathy is inherited in an autosomal recessive manner. This means that to be affected an individual must have a mutation in both copies of the SORD gene.  Individuals with just one mutation in the SORD gene are said to be carriers.  Carriers do not have SORD-related neuropathy but can have an affected child if their partner is also a carrier.  When both parents are carrier,  there is a 25% chance for each child to be affected, a 50% chance to be a carrier, and a 25% chance to be unaffected and not a carrier.     There is currently no cure or treatment for SORD neuropathy. However, [employer] is running a clinical trial that is currently enrolling patients with SORD neuropathy. If successful, a targeted treatment will be available to individuals diagnosed with this condition. SORD genetic testing is necessary to determine eligibility for clinical trials and targeted treatment.    We discussed the availability of SORD gene testing through the [facility] [employer] diagnostics laboratories via insurance or a patient pay price of 787 dollars. Risks, benefits, limitations and possible results were reviewed.    Ai expressed an excellent understanding of this information and decided to proceed with SORD testing pending insurance approval. Ai will wait to proceed with whole exome sequencing until the results of SORD testing are available and until her sister Bettye Cano has been evaluated by Dr Verde as a possible participating family member. Our care coordinators will call Bettye for scheduling.    Plan:  1. SORD gene sequencing and deletion/duplication analysis at the [facility] molecular diagnostics laboratory pending  insurance approval.  2. Return pending results of above testing  3. Contact information was provided should any questions arise in the future.    Renay Marie MS [facility]  Genetic Counselor  Division of Genetics and Metabolism  (p) [phone]  (f) [phone]     Total time spent in consultation with the family was approximately 33 minutes  Provider location at time of visit:     Cc: No Letter

## 2023-01-03 ENCOUNTER — VIRTUAL VISIT (OUTPATIENT)
Dept: CONSULT | Facility: CLINIC | Age: 63
End: 2023-01-03
Attending: GENETIC COUNSELOR, MS
Payer: COMMERCIAL

## 2023-01-03 DIAGNOSIS — G62.9 NEUROPATHY: Primary | ICD-10-CM

## 2023-01-03 PROCEDURE — 96040 HC GENETIC COUNSELING, EACH 30 MINUTES: CPT | Mod: TEL,95 | Performed by: GENETIC COUNSELOR, MS

## 2023-01-03 RX ORDER — DULOXETIN HYDROCHLORIDE 60 MG/1
CAPSULE, DELAYED RELEASE ORAL
Status: ON HOLD | COMMUNITY
Start: 2022-10-26 | End: 2023-09-11

## 2023-01-03 ASSESSMENT — PAIN SCALES - GENERAL: PAINLEVEL: MILD PAIN (2)

## 2023-01-03 NOTE — PROGRESS NOTES
Ai is a 62 year old who is being evaluated via a billable telephone visit.      What phone number would you like to be contacted at? [phone]  How would you like to obtain your AVS? francisco Portillo

## 2023-01-03 NOTE — NURSING NOTE
Pt stated that she has zinc listed on the medication list.  This is an over the counter medication.  Pt is out at the moment and needs to get more.    Pt is having some residual swelling from ankle surgery.  Dr said to use CBD.  Pt stated that didn't help, so she is using over the counter Naproxen.    Malia Portillo

## 2023-01-30 ENCOUNTER — TELEPHONE (OUTPATIENT)
Dept: NEUROLOGY | Facility: CLINIC | Age: 63
End: 2023-01-30

## 2023-01-30 NOTE — TELEPHONE ENCOUNTER
Ai contacted me and left a voicemail to inform me that she received a letter of approval from her insurance company for SORD genetic testing. She would like to get her blood drawn at [facility]. I will ask our  Larissa to contact Ai, review the anticipated cost for this testing and assist her with placing orders and scheduling a blood draw.    Renay Marie MS [facility]  Genetic Counselor  Division of Genetics and Metabolism  (p) [phone]  (f) [phone]

## 2023-02-14 ENCOUNTER — LAB (OUTPATIENT)
Dept: LAB | Facility: CLINIC | Age: 63
End: 2023-02-14
Payer: COMMERCIAL

## 2023-02-14 DIAGNOSIS — G62.9 NEUROPATHY: ICD-10-CM

## 2023-02-14 LAB
INTERPRETATION: NORMAL
LAB PDF RESULT: NORMAL
SIGNIFICANT RESULTS: NORMAL
SPECIMEN DESCRIPTION: NORMAL
TEST DETAILS, MDL: NORMAL

## 2023-02-14 PROCEDURE — 36415 COLL VENOUS BLD VENIPUNCTURE: CPT

## 2023-02-14 PROCEDURE — G0452 MOLECULAR PATHOLOGY INTERPR: HCPCS | Mod: 26 | Performed by: PATHOLOGY

## 2023-02-15 ENCOUNTER — TELEPHONE (OUTPATIENT)
Dept: NEUROLOGY | Facility: CLINIC | Age: 63
End: 2023-02-15

## 2023-02-15 NOTE — TELEPHONE ENCOUNTER
Ai contacted me to let me know that she had not heard from our  Larissa and was unaware of the estimated cost for her testing. She went to the [facility] for a blood draw.    I confirmed with Larissa that the estimated out of pocket cost for her testing is 780 dollars. Ai was comfortable with that price and would like to proceed with testing.    Larissa will place orders for SORD testing and I will contact Ai in about 4 weeks when results are ready.    Renay Marie MS [facility]  Genetic Counselor  Division of Genetics and Metabolism  (p) [phone]  (f) [phone]

## 2023-02-16 LAB — INTERPRETATION: NORMAL

## 2023-02-18 ENCOUNTER — HEALTH MAINTENANCE LETTER (OUTPATIENT)
Age: 63
End: 2023-02-18

## 2023-03-07 ENCOUNTER — LAB (OUTPATIENT)
Dept: LAB | Facility: CLINIC | Age: 63
End: 2023-03-07
Payer: COMMERCIAL

## 2023-03-07 DIAGNOSIS — G62.9 NEUROPATHY: Primary | ICD-10-CM

## 2023-03-07 PROCEDURE — 81479 UNLISTED MOLECULAR PATHOLOGY: CPT | Performed by: PSYCHIATRY & NEUROLOGY

## 2023-03-08 ENCOUNTER — TELEPHONE (OUTPATIENT)
Dept: CONSULT | Facility: CLINIC | Age: 63
End: 2023-03-08
Payer: COMMERCIAL

## 2023-03-08 NOTE — TELEPHONE ENCOUNTER
Contacted Ai to review the results of her SORD genetic testing. Left a non detailed VM.    When she returns my call, I will explain that her SORD test results were negative or normal. This means that a disease causing change was not identified in this gene and this is likely not the cause of her symptoms. Ai should continue to follow with Dr Verde for up to date medical management recommendations.    Spoke with Ai and reviewed the information above. She expressed an excellent understanding of this information.    Renay Marie MS [facility]  Genetic Counselor  Division of Genetics and Metabolism  (p) [phone]  (f) [phone]

## 2023-07-07 ENCOUNTER — MEDICAL CORRESPONDENCE (OUTPATIENT)
Dept: HEALTH INFORMATION MANAGEMENT | Facility: CLINIC | Age: 63
End: 2023-07-07

## 2023-09-11 ENCOUNTER — APPOINTMENT (OUTPATIENT)
Dept: GENERAL RADIOLOGY | Facility: CLINIC | Age: 63
End: 2023-09-11
Attending: ORTHOPAEDIC SURGERY
Payer: COMMERCIAL

## 2023-09-11 ENCOUNTER — ANESTHESIA (OUTPATIENT)
Dept: SURGERY | Facility: CLINIC | Age: 63
End: 2023-09-11
Payer: COMMERCIAL

## 2023-09-11 ENCOUNTER — ANESTHESIA EVENT (OUTPATIENT)
Dept: SURGERY | Facility: CLINIC | Age: 63
End: 2023-09-11
Payer: COMMERCIAL

## 2023-09-11 ENCOUNTER — HOSPITAL ENCOUNTER (OUTPATIENT)
Facility: CLINIC | Age: 63
Discharge: HOME | End: 2023-09-11
Attending: ORTHOPAEDIC SURGERY | Admitting: ORTHOPAEDIC SURGERY
Payer: COMMERCIAL

## 2023-09-11 VITALS
OXYGEN SATURATION: 98 % | BODY MASS INDEX: 33.93 KG/M2 | HEIGHT: 70 IN | HEART RATE: 66 BPM | WEIGHT: 237 LBS | TEMPERATURE: 98.1 F | DIASTOLIC BLOOD PRESSURE: 65 MMHG | SYSTOLIC BLOOD PRESSURE: 115 MMHG | RESPIRATION RATE: 16 BRPM

## 2023-09-11 DIAGNOSIS — Z98.890 S/P FOOT SURGERY, LEFT: Primary | ICD-10-CM

## 2023-09-11 PROCEDURE — C1762 CONN TISS, HUMAN(INC FASCIA): HCPCS | Performed by: ORTHOPAEDIC SURGERY

## 2023-09-11 PROCEDURE — 258N000003 HC RX IP 258 OP 636: Performed by: STUDENT IN AN ORGANIZED HEALTH CARE EDUCATION/TRAINING PROGRAM

## 2023-09-11 PROCEDURE — 710N000009 HC RECOVERY PHASE 1, LEVEL 1, PER MIN: Performed by: ORTHOPAEDIC SURGERY

## 2023-09-11 PROCEDURE — 271N000001 HC OR GENERAL SUPPLY NON-STERILE: Performed by: ORTHOPAEDIC SURGERY

## 2023-09-11 PROCEDURE — 999N000179 XR SURGERY CARM FLUORO LESS THAN 5 MIN W STILLS

## 2023-09-11 PROCEDURE — 370N000017 HC ANESTHESIA TECHNICAL FEE, PER MIN: Performed by: ORTHOPAEDIC SURGERY

## 2023-09-11 PROCEDURE — 250N000009 HC RX 250: Performed by: NURSE ANESTHETIST, CERTIFIED REGISTERED

## 2023-09-11 PROCEDURE — 360N000076 HC SURGERY LEVEL 3, PER MIN: Performed by: ORTHOPAEDIC SURGERY

## 2023-09-11 PROCEDURE — 250N000011 HC RX IP 250 OP 636: Mod: JZ | Performed by: STUDENT IN AN ORGANIZED HEALTH CARE EDUCATION/TRAINING PROGRAM

## 2023-09-11 PROCEDURE — 93005 ELECTROCARDIOGRAM TRACING: CPT | Mod: XU

## 2023-09-11 PROCEDURE — 250N000011 HC RX IP 250 OP 636: Performed by: NURSE ANESTHETIST, CERTIFIED REGISTERED

## 2023-09-11 PROCEDURE — L8699 PROSTHETIC IMPLANT NOS: HCPCS | Performed by: ORTHOPAEDIC SURGERY

## 2023-09-11 PROCEDURE — 250N000025 HC SEVOFLURANE, PER MIN: Performed by: ORTHOPAEDIC SURGERY

## 2023-09-11 PROCEDURE — 999N000141 HC STATISTIC PRE-PROCEDURE NURSING ASSESSMENT: Performed by: ORTHOPAEDIC SURGERY

## 2023-09-11 PROCEDURE — 93010 ELECTROCARDIOGRAM REPORT: CPT | Performed by: INTERNAL MEDICINE

## 2023-09-11 PROCEDURE — 710N000012 HC RECOVERY PHASE 2, PER MINUTE: Performed by: ORTHOPAEDIC SURGERY

## 2023-09-11 PROCEDURE — 250N000011 HC RX IP 250 OP 636: Performed by: STUDENT IN AN ORGANIZED HEALTH CARE EDUCATION/TRAINING PROGRAM

## 2023-09-11 PROCEDURE — 272N000001 HC OR GENERAL SUPPLY STERILE: Performed by: ORTHOPAEDIC SURGERY

## 2023-09-11 PROCEDURE — 250N000009 HC RX 250: Performed by: STUDENT IN AN ORGANIZED HEALTH CARE EDUCATION/TRAINING PROGRAM

## 2023-09-11 DEVICE — IMPLANTABLE DEVICE: Type: IMPLANTABLE DEVICE | Site: ANKLE | Status: FUNCTIONAL

## 2023-09-11 DEVICE — GRAFT BONE INFUSE BMP MED 7510400: Type: IMPLANTABLE DEVICE | Site: ANKLE | Status: FUNCTIONAL

## 2023-09-11 RX ORDER — DIPHENHYDRAMINE HCL 25 MG
25 CAPSULE ORAL ONCE
Status: COMPLETED | OUTPATIENT
Start: 2023-09-11 | End: 2023-09-11

## 2023-09-11 RX ORDER — ONDANSETRON 2 MG/ML
4 INJECTION INTRAMUSCULAR; INTRAVENOUS EVERY 30 MIN PRN
Status: DISCONTINUED | OUTPATIENT
Start: 2023-09-11 | End: 2023-09-11 | Stop reason: HOSPADM

## 2023-09-11 RX ORDER — SODIUM CHLORIDE, SODIUM LACTATE, POTASSIUM CHLORIDE, CALCIUM CHLORIDE 600; 310; 30; 20 MG/100ML; MG/100ML; MG/100ML; MG/100ML
INJECTION, SOLUTION INTRAVENOUS CONTINUOUS
Status: DISCONTINUED | OUTPATIENT
Start: 2023-09-11 | End: 2023-09-11 | Stop reason: HOSPADM

## 2023-09-11 RX ORDER — FENTANYL CITRATE 50 UG/ML
INJECTION, SOLUTION INTRAMUSCULAR; INTRAVENOUS PRN
Status: DISCONTINUED | OUTPATIENT
Start: 2023-09-11 | End: 2023-09-11

## 2023-09-11 RX ORDER — ONDANSETRON 4 MG/1
4 TABLET, ORALLY DISINTEGRATING ORAL EVERY 30 MIN PRN
Status: DISCONTINUED | OUTPATIENT
Start: 2023-09-11 | End: 2023-09-11 | Stop reason: HOSPADM

## 2023-09-11 RX ORDER — LIDOCAINE HYDROCHLORIDE 20 MG/ML
INJECTION, SOLUTION INFILTRATION; PERINEURAL PRN
Status: DISCONTINUED | OUTPATIENT
Start: 2023-09-11 | End: 2023-09-11

## 2023-09-11 RX ORDER — AMOXICILLIN 250 MG
1-2 CAPSULE ORAL 2 TIMES DAILY
Qty: 30 TABLET | Refills: 0 | Status: SHIPPED | OUTPATIENT
Start: 2023-09-11 | End: 2024-10-04

## 2023-09-11 RX ORDER — HYDROXYZINE HYDROCHLORIDE 25 MG/1
25 TABLET, FILM COATED ORAL
Status: CANCELLED | OUTPATIENT
Start: 2023-09-11

## 2023-09-11 RX ORDER — HYDROMORPHONE HCL IN WATER/PF 6 MG/30 ML
0.2 PATIENT CONTROLLED ANALGESIA SYRINGE INTRAVENOUS EVERY 5 MIN PRN
Status: DISCONTINUED | OUTPATIENT
Start: 2023-09-11 | End: 2023-09-11 | Stop reason: HOSPADM

## 2023-09-11 RX ORDER — LIDOCAINE 40 MG/G
CREAM TOPICAL
Status: DISCONTINUED | OUTPATIENT
Start: 2023-09-11 | End: 2023-09-11 | Stop reason: HOSPADM

## 2023-09-11 RX ORDER — FENTANYL CITRATE 0.05 MG/ML
25 INJECTION, SOLUTION INTRAMUSCULAR; INTRAVENOUS EVERY 5 MIN PRN
Status: DISCONTINUED | OUTPATIENT
Start: 2023-09-11 | End: 2023-09-11 | Stop reason: HOSPADM

## 2023-09-11 RX ORDER — DIPHENHYDRAMINE HYDROCHLORIDE 50 MG/ML
25 INJECTION INTRAMUSCULAR; INTRAVENOUS ONCE
Status: DISCONTINUED | OUTPATIENT
Start: 2023-09-11 | End: 2023-09-11 | Stop reason: HOSPADM

## 2023-09-11 RX ORDER — DIPHENHYDRAMINE HYDROCHLORIDE 50 MG/ML
50 INJECTION INTRAMUSCULAR; INTRAVENOUS ONCE
Status: COMPLETED | OUTPATIENT
Start: 2023-09-11 | End: 2023-09-11

## 2023-09-11 RX ORDER — HYDROMORPHONE HCL IN WATER/PF 6 MG/30 ML
0.4 PATIENT CONTROLLED ANALGESIA SYRINGE INTRAVENOUS EVERY 5 MIN PRN
Status: DISCONTINUED | OUTPATIENT
Start: 2023-09-11 | End: 2023-09-11 | Stop reason: HOSPADM

## 2023-09-11 RX ORDER — PROPOFOL 10 MG/ML
INJECTION, EMULSION INTRAVENOUS PRN
Status: DISCONTINUED | OUTPATIENT
Start: 2023-09-11 | End: 2023-09-11

## 2023-09-11 RX ORDER — OXYCODONE HYDROCHLORIDE 5 MG/1
10 TABLET ORAL
Status: CANCELLED | OUTPATIENT
Start: 2023-09-11

## 2023-09-11 RX ORDER — DIPHENHYDRAMINE HCL 25 MG
50 CAPSULE ORAL ONCE
Status: DISCONTINUED | OUTPATIENT
Start: 2023-09-11 | End: 2023-09-11 | Stop reason: HOSPADM

## 2023-09-11 RX ORDER — FENTANYL CITRATE 0.05 MG/ML
50 INJECTION, SOLUTION INTRAMUSCULAR; INTRAVENOUS EVERY 5 MIN PRN
Status: DISCONTINUED | OUTPATIENT
Start: 2023-09-11 | End: 2023-09-11 | Stop reason: HOSPADM

## 2023-09-11 RX ORDER — ONDANSETRON 4 MG/1
4 TABLET, ORALLY DISINTEGRATING ORAL
Status: CANCELLED | OUTPATIENT
Start: 2023-09-11

## 2023-09-11 RX ORDER — ONDANSETRON 4 MG/1
4 TABLET, ORALLY DISINTEGRATING ORAL EVERY 6 HOURS PRN
Qty: 15 TABLET | Refills: 0 | Status: SHIPPED | OUTPATIENT
Start: 2023-09-11 | End: 2024-02-27

## 2023-09-11 RX ORDER — HYDROMORPHONE HYDROCHLORIDE 2 MG/1
4 TABLET ORAL EVERY 4 HOURS PRN
Qty: 40 TABLET | Refills: 0 | Status: SHIPPED | OUTPATIENT
Start: 2023-09-11 | End: 2023-09-14

## 2023-09-11 RX ORDER — METHYLPREDNISOLONE SODIUM SUCCINATE 500 MG/8ML
INJECTION INTRAMUSCULAR; INTRAVENOUS PRN
Status: DISCONTINUED | OUTPATIENT
Start: 2023-09-11 | End: 2023-09-11

## 2023-09-11 RX ORDER — HYDROXYZINE PAMOATE 25 MG/1
25 CAPSULE ORAL 3 TIMES DAILY PRN
Qty: 40 CAPSULE | Refills: 1 | Status: SHIPPED | OUTPATIENT
Start: 2023-09-11 | End: 2024-07-01

## 2023-09-11 RX ADMIN — FAMOTIDINE 20 MG: 10 INJECTION INTRAVENOUS at 10:15

## 2023-09-11 RX ADMIN — FENTANYL CITRATE 100 MCG: 50 INJECTION, SOLUTION INTRAMUSCULAR; INTRAVENOUS at 10:48

## 2023-09-11 RX ADMIN — METHYLPREDNISOLONE SODIUM SUCCINATE 125 MG: 500 INJECTION, POWDER, FOR SOLUTION INTRAMUSCULAR; INTRAVENOUS at 10:53

## 2023-09-11 RX ADMIN — PROPOFOL 200 MG: 10 INJECTION, EMULSION INTRAVENOUS at 10:48

## 2023-09-11 RX ADMIN — BUPIVACAINE HYDROCHLORIDE 10 ML: 5 INJECTION, SOLUTION EPIDURAL; INTRACAUDAL at 11:35

## 2023-09-11 RX ADMIN — HYDROMORPHONE HYDROCHLORIDE 0.5 MG: 1 INJECTION, SOLUTION INTRAMUSCULAR; INTRAVENOUS; SUBCUTANEOUS at 11:05

## 2023-09-11 RX ADMIN — SODIUM CHLORIDE, POTASSIUM CHLORIDE, SODIUM LACTATE AND CALCIUM CHLORIDE: 600; 310; 30; 20 INJECTION, SOLUTION INTRAVENOUS at 11:06

## 2023-09-11 RX ADMIN — DIPHENHYDRAMINE HYDROCHLORIDE 50 MG: 50 INJECTION, SOLUTION INTRAMUSCULAR; INTRAVENOUS at 10:17

## 2023-09-11 RX ADMIN — HYDROMORPHONE HYDROCHLORIDE 0.5 MG: 1 INJECTION, SOLUTION INTRAMUSCULAR; INTRAVENOUS; SUBCUTANEOUS at 10:52

## 2023-09-11 RX ADMIN — LIDOCAINE HYDROCHLORIDE 60 MG: 20 INJECTION, SOLUTION INFILTRATION; PERINEURAL at 10:48

## 2023-09-11 RX ADMIN — SODIUM CHLORIDE, POTASSIUM CHLORIDE, SODIUM LACTATE AND CALCIUM CHLORIDE: 600; 310; 30; 20 INJECTION, SOLUTION INTRAVENOUS at 10:42

## 2023-09-11 ASSESSMENT — ACTIVITIES OF DAILY LIVING (ADL)
DOING_ERRANDS_INDEPENDENTLY_DIFFICULTY: NO
ADLS_ACUITY_SCORE: 19
ADLS_ACUITY_SCORE: 35
ADLS_ACUITY_SCORE: 19

## 2023-09-11 ASSESSMENT — LIFESTYLE VARIABLES: TOBACCO_USE: 1

## 2023-09-11 NOTE — OR NURSING
"Vancomycin was started per MD order.  AFter five minutes of infusion, patient reported pain in right foot and feeling flushed.     Dr. Florez in room to see patient.  Face red, chest red, back red.  Vancomycin stopped.  Lactated ringers bolus.  Pt given pepcid 20 mg and benadryl 50 mg.    Redness on back, chest, arm, and lower legs.  After medication stopped and medications given redness is subsidding.    EKG wnl.    Pt told this RN \"I feel so much better.\"  Blood pressure 96/59, p66, r 20. 100 on RA, temp 98.2  "

## 2023-09-11 NOTE — ANESTHESIA PROCEDURE NOTES
Airway       Patient location during procedure: OR  Staff -        CRNA: Kavon Whitfield APRN CRNA       Performed By: CRNA  Consent for Airway        Urgency: elective  Indications and Patient Condition       Indications for airway management: anaya-procedural       Induction type:intravenous       Mask difficulty assessment: 1 - vent by mask    Final Airway Details       Final airway type: supraglottic airway    Supraglottic Airway Details        Type: LMA       Brand: I-Gel       LMA size: 5    Post intubation assessment        Placement verified by: capnometry and chest rise        Number of attempts at approach: 1       Number of other approaches attempted: 0       Ease of procedure: easy       Dentition: Intact and Unchanged

## 2023-09-11 NOTE — ANESTHESIA PROCEDURE NOTES
Popliteal and Sciatic Procedure Note    Pre-Procedure   Staff -        Anesthesiologist:  Arturo Florez MD       Performed By: anesthesiologist       Location: pre-op       Procedure Start/Stop Times: 9/11/2023 11:38 AM and 9/11/2023 11:42 AM       Pre-Anesthestic Checklist: patient identified, IV checked, site marked, risks and benefits discussed, informed consent, monitors and equipment checked, pre-op evaluation, at physician/surgeon's request and post-op pain management  Timeout:       Correct Patient: Yes        Correct Procedure: Yes        Correct Site: Yes        Correct Position: Yes        Correct Laterality: Yes        Site Marked: Yes  Procedure Documentation  Procedure: Popliteal, Sciatic       Diagnosis: LEFT ANKLE ARTHRITIS       Laterality: left       Patient Position: supine       Skin prep: Chloraprep       Local skin infiltrated with 1 mL of 1% lidocaine.        Needle Type: insulated       Needle Gauge: 21.        Needle Length (millimeters): 100        Ultrasound guided       1. Ultrasound was used to identify targeted nerve, plexus, vascular marker, or fascial plane and place a needle adjacent to it in real-time.       2. Ultrasound was used to visualize the spread of anesthetic in close proximity to the above referenced structure.       3. A permanent image is entered into the patient's record.       4. The visualized anatomic structures appeared normal.       5. There were no apparent abnormal pathologic findings.    Assessment/Narrative         The placement was negative for: blood aspirated, painful injection and site bleeding       Paresthesias: No.       Bolus given via needle..        Secured via.        Insertion/Infusion Method: Single Shot       Complications: none    Medication(s) Administered   Bupivacaine 0.5% w/ 1:400K Epi (Injection) - Injection   20 mL - 9/11/2023 11:38:00 AM  Medication Administration Time: 9/11/2023 11:38 AM     Comments:  Bolus via needle, 20 mL of 0.5%  "bupivacaine with 1:400,000 epinephrine.    Under ultrasound guidance, a 21 gauge needle was inserted and placed in close proximity to the sciatic nerve. Ultrasound was also used to visualize the spread of anesthetic in close proximity to the nerve being blocked. The nerve appeared anatomically normal, and there were no apparent abnormal pathological findings. Patient tolerated well, was mildly sedated but communicative throughout the procedure. A permanent ultrasound image was saved in the patient's record.    The surgeon has given a verbal order transferring care of this patient to me for the performance of regional analgesia block for post op pain control. It is requested of me because I am uniquely trained and qualified to perform this block and the surgeon is neither trained nor qualified to perform this procedure.         FOR [facility] ([facility]) ONLY:   Pain Team Contact information: please page the Pain Team Via [employer]. Search \"Pain\". During daytime hours, please page the attending first. At night please page the resident first.      "

## 2023-09-11 NOTE — ANESTHESIA CARE TRANSFER NOTE
Patient: Ai Boyer    Procedure: Procedure(s):  LEFT TIBIALIS ANTERIOR TENODESIS AND LEFT BONE GRAFT SUBTALAR JOINT       Diagnosis: Pseudarthrosis after fusion or arthrodesis [M96.0]  Pain in joint, ankle and foot [M25.579]  Diagnosis Additional Information: No value filed.    Anesthesia Type:   General     Note:    Oropharynx: oropharynx clear of all foreign objects and spontaneously breathing  Level of Consciousness: awake  Oxygen Supplementation: nasal cannula  Level of Supplemental Oxygen (L/min / FiO2): 2  Independent Airway: airway patency satisfactory and stable  Dentition: dentition unchanged  Vital Signs Stable: post-procedure vital signs reviewed and stable  Report to RN Given: handoff report given  Patient transferred to: PACU    Handoff Report: Identifed the Patient, Identified the Reponsible Provider, Reviewed the pertinent medical history, Discussed the surgical course, Reviewed Intra-OP anesthesia mangement and issues during anesthesia, Set expectations for post-procedure period and Allowed opportunity for questions and acknowledgement of understanding      Vitals:  Vitals Value Taken Time   /80 09/11/23 1200   Temp     Pulse 77 09/11/23 1204   Resp 0 09/11/23 1204   SpO2 97 % 09/11/23 1204   Vitals shown include unvalidated device data.    Electronically Signed By: ROXANNA Huerta CRNA  September 11, 2023  12:05 PM

## 2023-09-11 NOTE — DISCHARGE INSTRUCTIONS
"    [facility]      DISCHARGE INSTRUCTIONS FOLLOWING   REGIONAL BLOCK ANESTHESIA    Numbness or lack of feeling in the arm/leg that was operated may last up to 24 hours.  The average time is usually 10-15 hours.  You may not be able to lift or move the arm or leg where the operation was by itself during that time.  Long-acting local anesthetic medicines were used to give you long-lasting pain relief.  Wear a sling until your arm is completely \"awake\"  Avoid bumping your arm, leg or foot while it is numb  Avoid extremes of hot or cold while it is numb  Remain quiet and restful the day of surgery.  Resume normal activities gradually over the next day or so as advise by your surgeon.  Do not drive or operate  Any machinery until your extremity is full  \"awake\"        You will have a tingling and prickly sensation in your arm/leg as the feeling begins to return; you can also expect some discomfort. The amount of discomfort is unpredictable, but if you have more pain that can be controlled with the pain medication you received, you should contact your surgeon.  Start to take your pain pills as soon as you start to feel any discomfort or pain.                    Same Day Surgery Discharge Instructions for  Sedation and General Anesthesia     It's not unusual to feel dizzy, light-headed or faint for up to 24 hours after surgery or while taking pain medication.  If you have these symptoms: sit for a few minutes before standing and have someone assist you when you get up to walk or use the bathroom.    You should rest and relax for the next 24 hours. We recommend you make arrangements to have an adult stay with you for at least 24 hours after your discharge.  Avoid hazardous and strenuous activity.    DO NOT DRIVE any vehicle or operate mechanical equipment for 24 hours following the end of your surgery.  Even though you may feel normal, your reactions may be affected by the medication you have received.    Do " not drink alcoholic beverages for 24 hours following surgery.     Slowly progress to your regular diet as you feel able. It's not unusual to feel nauseated and/or vomit after receiving anesthesia.  If you develop these symptoms, drink clear liquids (apple juice, ginger ale, broth, 7-up, etc. ) until you feel better.  If your nausea and vomiting persists for 24 hours, please notify your surgeon.      All narcotic pain medications, along with inactivity and anesthesia, can cause constipation. Drinking plenty of liquids and increasing fiber intake will help.    For any questions of a medical nature, call your surgeon.    Do not make important decisions for 24 hours.    If you had general anesthesia, you may have a sore throat for a couple of days related to the breathing tube used during surgery.  You may use Cepacol lozenges to help with this discomfort.  If it worsens or if you develop a fever, contact your surgeon.     If you feel your pain is not well managed with the pain medications prescribed by your surgeon, please contact your surgeon's office to let them know so they can address your concerns.     **If you have questions or concerns about your procedure,   call Dr. Dalal at [phone]**

## 2023-09-11 NOTE — ANESTHESIA PREPROCEDURE EVALUATION
Anesthesia Pre-Procedure Evaluation    Patient: Ai Boyer   MRN: [id number] : 1960        Procedure : Procedure(s):  LEFT TIBIALIS ANTERIOR TENODESIS AND LEFT BONE GRAFT SUBTALAR JOINT          Past Medical History:   Diagnosis Date    Arthritis     At risk for healthcare associated infection 2019    Foot drop, bilateral     IBS (irritable bowel syndrome)     Obese     Peripheral neuropathy       Past Surgical History:   Procedure Laterality Date     knee arthroscopy  Left     meniscus repair    ARTHRODESIS ANKLE  2012    Procedure:ARTHRODESIS ANKLE; right ankle medial closing wedge ostetomy intermedually nailing removal of hardware; Surgeon:HUMA DALAL; Location: [facility]    ARTHRODESIS ANKLE Left 2022    Procedure: TIBIOTALOCALCANEAL FUSION LEFT ANKLE;  Surgeon: Huma Dalal MD;  Location:  OR    ARTHROPLASTY REVISION HIP Left 2019    Procedure: Revision left total hip arthroplasty;  Surgeon: More Stephens MD;  Location: [facility] OR     SECTION      GYN SURGERY      oopherectomy    ORTHOPEDIC SURGERY Left     left hip     right ankle fusion  Right       Allergies   Allergen Reactions    Sporanox [Itraconazole] Anaphylaxis    Amoxicillin Itching    Ceftin Hives    Clindamycin Hcl Itching    Codeine Sulfate Nausea    Lisinopril Cough    Morphine Hcl Hives    Vancomycin Other (See Comments)     Lightheaded, red chest shoulders, pain left foot      Hydrocodone-Acetaminophen Rash    Keflex [Cephalexin Hcl] Rash      Social History     Tobacco Use    Smoking status: Every Day     Packs/day: 1.00     Types: Cigarettes    Smokeless tobacco: Never    Tobacco comments:     1-pack daily    Substance Use Topics    Alcohol use: Yes     Comment: 1 - 4 times per week      Wt Readings from Last 1 Encounters:   23 107.5 kg (237 lb)        Anesthesia Evaluation   Pt has had prior anesthetic. Type: General.    No history of anesthetic complications       ROS/MED  HX  ENT/Pulmonary:     (+)     PETR risk factors,           tobacco use, Current use,                   (-) sleep apnea   Neurologic:    (-) no CVA and no TIA   Cardiovascular:    (-) CAD, pacemaker, stent, pacemaker and ICD   METS/Exercise Tolerance: 3 - Able to walk 1-2 blocks without stopping    Hematologic:  - neg hematologic  ROS     Musculoskeletal:  - neg musculoskeletal ROS     GI/Hepatic:    (-) GERD   Renal/Genitourinary:    (-) renal disease   Endo:     (+)               Obesity,    (-) Type II DM   Psychiatric/Substance Use:     (+)   alcohol abuse H/O chronic opiod use .     Infectious Disease:  - neg infectious disease ROS     Malignancy:  - neg malignancy ROS     Other:  - neg other ROS          Physical Exam    Airway        Mallampati: II   TM distance: > 3 FB   Neck ROM: full   Mouth opening: > 3 cm    Respiratory Devices and Support         Dental       (+) Modest Abnormalities - crowns, retainers, 1 or 2 missing teeth      Cardiovascular          Rhythm and rate: regular and normal     Pulmonary           breath sounds clear to auscultation           OUTSIDE LABS:  CBC:   Lab Results   Component Value Date    WBC 4.5 12/02/2021    WBC 6.4 12/04/2018    HGB 13.4 09/20/2022    HGB 15.3 12/02/2021    HCT 46.5 12/02/2021    HCT 47.0 12/04/2018     12/02/2021     12/04/2018     BMP:   Lab Results   Component Value Date     12/02/2021     05/22/2009    POTASSIUM 3.4 12/02/2021    POTASSIUM 4.0 05/22/2009    CHLORIDE 110 (H) 12/02/2021    CHLORIDE 106 05/22/2009    CO2 28 12/02/2021    CO2 27 05/22/2009    BUN 13 12/02/2021    BUN 16 05/21/2009    CR 0.72 09/19/2022    CR 0.88 12/02/2021     (H) 09/20/2022     (H) 12/02/2021     COAGS:   Lab Results   Component Value Date    PTT 25 05/21/2009    INR 2.00 (H) 05/24/2009     POC: No results found for: BGM, HCG, HCGS  HEPATIC:   Lab Results   Component Value Date    ALBUMIN 3.8 12/02/2021    PROTTOTAL 6.6 (L)  12/02/2021    ALT 34 12/02/2021    AST 21 12/02/2021    ALKPHOS 75 12/02/2021    BILITOTAL 0.2 12/02/2021     OTHER:   Lab Results   Component Value Date    ANNI 9.4 12/02/2021    TSH 1.28 12/02/2021    CRP <2.9 11/13/2018    SED 3 11/13/2018       Anesthesia Plan    ASA Status:  3       Anesthesia Type: General.     - Airway: LMA   Induction: Propofol.   Maintenance: Balanced.        Consents    Anesthesia Plan(s) and associated risks, benefits, and realistic alternatives discussed. Questions answered and patient/representative(s) expressed understanding.     - Discussed: Risks, Benefits and Alternatives for the PROCEDURE were discussed     - Discussed with:  Patient            Postoperative Care    Pain management: IV analgesics, Oral pain medications, Peripheral nerve block (Single Shot), Multi-modal analgesia.   PONV prophylaxis: Ondansetron (or other 5HT-3), Dexamethasone or Solumedrol     Comments:                Arturo Florez MD

## 2023-09-11 NOTE — OP NOTE
Preoperative diagnosis:   Delayed healing or failure left subtalar fusion.  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  Backing out of the screw in the posterior aspect of the calcaneus    Postoperative diagnosis:  The same,    Surgical procedure:  Debridement and bone grafting of left subtalar joint augmented with infuse.  2. Shortening and  tenodesis of the anterior  tibialis tendon as well as the extensor digitorum communis.   3.  Tightening of the screw in the posterior aspect of the calcaneus as well as removal of a screw in the talus.    Anesthetic: General and popliteal block.  Surgeon: Jose Daniel Dalal MD  Assistant: Jamee Garcia PA-C    Preamble:  She presents with a delayed healing of her subtalar fusion.  She had a TTC fusion done on the tibiotalar [place] is fine.  The screw over the posterior aspect of the calcaneus is also backing out of bed.  She has a flaccid paralysis and trips Over the Forefoot Even Though Her Ankle Is Fused.    Informed consent was obtained for above-mentioned procedure.    Need for an assistant:  Skilled first assistant was needed throughout the case to help with positioning, retracting, wound closure, dressing and splint application.    Description of procedure:  After adequate induction of a general anesthetic the patient was positioned supine on the operating table.  The left leg was sterilely prepped and free draped in the usual fashion.  Tourniquet around the thigh was inflated to 300 mmHg.    An incision was made laterally over the sinus Tarsi.  The subtalar joint was exposed.  There was one screw through the IM osman that was protruding embedded in the screw into the talus was removed.  This was followed by removing fibrous nonunion tissue around the lateral aspect of the subtalar joint.  The subtalar joint was prepared around the IM osman.  10 cc of demineralized bone chips was then impacted in the sinus Tarsi medium infuse packed  around the area.    This was followed by a 2 cm incision posterior over the calcaneus.  Under fluoroscopic guidance screw from posterior to anterior was exposed and tightened into the IM osman.    This was followed by along with the incision over the dorsum of her left foot.  The tibialis anterior tendon as well as the extensor digitorum commonnis were exposed.  The tibialis anterior tendon was shortened by about 3 cm and tenodesed to itself to keep the forefoot neutral ankle.  EDC was transected and then transferred into the tibialis anterior tendon under maximum tension on the distal ends of the EDC to course the tenodesis effect of all her toes and elevate the lateral border of her foot.    This gave adequate stability of her midfoot.    Tourniquet was deflated.  Hemostasis obtained.  Wound was closed in layers.  A sterile dressing and a light compressive bandage applied.  She tolerated the procedure well and was taken to the recovery room in satisfactory condition after application of a short leg cast.  She can be weightbearing as tolerated.  Sutures will be removed in 2 weeks.

## 2023-09-11 NOTE — ANESTHESIA PROCEDURE NOTES
Adductor canal (targeting saphenous nerve) Procedure Note    Pre-Procedure   Staff -        Anesthesiologist:  Arturo Florez MD       Performed By: anesthesiologist       Location: pre-op       Procedure Start/Stop Times: 9/11/2023 11:35 AM and 9/11/2023 11:37 AM       Pre-Anesthestic Checklist: patient identified, IV checked, site marked, risks and benefits discussed, informed consent, monitors and equipment checked, pre-op evaluation, at physician/surgeon's request and post-op pain management  Timeout:       Correct Patient: Yes        Correct Procedure: Yes        Correct Site: Yes        Correct Position: Yes        Correct Laterality: Yes        Site Marked: Yes  Procedure Documentation  Procedure: Adductor canal (targeting saphenous nerve)       Diagnosis: LEFT ANKLE ARTHRITIS       Laterality: left       Patient Position: supine       Skin prep: Chloraprep       Local skin infiltrated with 1 mL of 1% lidocaine.        Needle Type: insulated       Needle Gauge: 21.        Needle Length (millimeters): 90        Ultrasound guided       1. Ultrasound was used to identify targeted nerve, plexus, vascular marker, or fascial plane and place a needle adjacent to it in real-time.       2. Ultrasound was used to visualize the spread of anesthetic in close proximity to the above referenced structure.       3. A permanent image is entered into the patient's record.       4. The visualized anatomic structures appeared normal.       5. There were no apparent abnormal pathologic findings.    Assessment/Narrative         The placement was negative for: blood aspirated, painful injection and site bleeding       Paresthesias: No.       Bolus given via needle..        Secured via.        Insertion/Infusion Method: Single Shot       Complications: none    Medication(s) Administered   Bupivacaine 0.5% w/ 1:400K Epi (Injection) - Injection   10 mL - 9/11/2023 11:35:00 AM  Medication Administration Time: 9/11/2023 11:35 AM    "  Comments:  Bolus via needle, 10 mL of 0.5% bupivacaine with 1:400,000 epinephrine.    Under ultrasound guidance, a 21 gauge needle was inserted and placed in close proximity to the saphenous nerve. Ultrasound was also used to visualize the spread of anesthetic in close proximity to the nerve being blocked. The nerve appeared anatomically normal, and there were no apparent abnormal pathological findings. Patient tolerated well, was mildly sedated but communicative throughout the procedure. A permanent ultrasound image was saved in the patient's record.    The surgeon has given a verbal order transferring care of this patient to me for the performance of regional analgesia block for post op pain control. It is requested of me because I am uniquely trained and qualified to perform this block and the surgeon is neither trained nor qualified to perform this procedure.         FOR [facility] ([facility]) ONLY:   Pain Team Contact information: please page the Pain Team Via [employer]. Search \"Pain\". During daytime hours, please page the attending first. At night please page the resident first.      "

## 2023-09-12 LAB
ATRIAL RATE - MUSE: 71 BPM
DIASTOLIC BLOOD PRESSURE - MUSE: NORMAL MMHG
INTERPRETATION ECG - MUSE: NORMAL
P AXIS - MUSE: 77 DEGREES
PR INTERVAL - MUSE: 132 MS
QRS DURATION - MUSE: 80 MS
QT - MUSE: 424 MS
QTC - MUSE: 460 MS
R AXIS - MUSE: 82 DEGREES
SYSTOLIC BLOOD PRESSURE - MUSE: NORMAL MMHG
T AXIS - MUSE: 82 DEGREES
VENTRICULAR RATE- MUSE: 71 BPM

## 2024-02-27 ENCOUNTER — OFFICE VISIT (OUTPATIENT)
Dept: NEUROLOGY | Facility: CLINIC | Age: 64
End: 2024-02-27
Payer: COMMERCIAL

## 2024-02-27 VITALS
BODY MASS INDEX: 33.64 KG/M2 | WEIGHT: 235 LBS | DIASTOLIC BLOOD PRESSURE: 92 MMHG | HEART RATE: 77 BPM | SYSTOLIC BLOOD PRESSURE: 146 MMHG | HEIGHT: 70 IN

## 2024-02-27 DIAGNOSIS — G60.9 IDIOPATHIC NEUROPATHY: Primary | ICD-10-CM

## 2024-02-27 LAB
HBA1C MFR BLD: 5.8 % (ref 0–5.6)
HCV AB SERPL QL IA: NONREACTIVE
VIT B12 SERPL-MCNC: 581 PG/ML (ref 232–1245)

## 2024-02-27 PROCEDURE — 36415 COLL VENOUS BLD VENIPUNCTURE: CPT | Performed by: PSYCHIATRY & NEUROLOGY

## 2024-02-27 PROCEDURE — 83921 ORGANIC ACID SINGLE QUANT: CPT | Performed by: PSYCHIATRY & NEUROLOGY

## 2024-02-27 PROCEDURE — 86803 HEPATITIS C AB TEST: CPT | Performed by: PSYCHIATRY & NEUROLOGY

## 2024-02-27 PROCEDURE — 99000 SPECIMEN HANDLING OFFICE-LAB: CPT | Performed by: PSYCHIATRY & NEUROLOGY

## 2024-02-27 PROCEDURE — 84207 ASSAY OF VITAMIN B-6: CPT | Mod: 90 | Performed by: PSYCHIATRY & NEUROLOGY

## 2024-02-27 PROCEDURE — 83036 HEMOGLOBIN GLYCOSYLATED A1C: CPT | Performed by: PSYCHIATRY & NEUROLOGY

## 2024-02-27 PROCEDURE — 82607 VITAMIN B-12: CPT | Performed by: PSYCHIATRY & NEUROLOGY

## 2024-02-27 PROCEDURE — 86038 ANTINUCLEAR ANTIBODIES: CPT | Performed by: PSYCHIATRY & NEUROLOGY

## 2024-02-27 PROCEDURE — 99215 OFFICE O/P EST HI 40 MIN: CPT | Performed by: PSYCHIATRY & NEUROLOGY

## 2024-02-27 RX ORDER — IBUPROFEN 800 MG/1
800 TABLET, FILM COATED ORAL 3 TIMES DAILY PRN
COMMUNITY
Start: 2024-02-21 | End: 2024-07-01

## 2024-02-27 RX ORDER — OMEPRAZOLE 40 MG/1
40 CAPSULE, DELAYED RELEASE ORAL
COMMUNITY
Start: 2024-01-30

## 2024-02-27 NOTE — NURSING NOTE
"Ai Boyer's goals for this visit include:   Chief Complaint   Patient presents with    RECHECK     Ideopathic neuropathy         She requests these members of her care team be copied on today's visit information: yes    PCP: Aly Amaya    Referring Provider:  Adeel Verde MD  [address] [place] [address] [id number]  [place] [address]    BP (!) 146/92   Pulse 77   Ht 1.778 m (5' 10\")   Wt 106.6 kg (235 lb)   BMI 33.72 kg/m      Do you need any medication refills at today's visit? No  SEPIDEH Ansari, CMA (AAMA)      "

## 2024-02-27 NOTE — PATIENT INSTRUCTIONS
We discussed your interim examination changes and symptoms.    Labs today.  Referral for balance therapy with a neuro-therapist at [facility].  EMG left arm.  Return 2 years or as needed.  We'll hold off on further genetic testing, as we discussed.

## 2024-02-27 NOTE — PROGRESS NOTES
Return visit for 63 year old woman with sensorimotor axonal neuropathy. Interim events:    Left ankle fusion. Recovering.  Paresthesias all fingers of left hand, worst in the thumb.  Positional numbness right upper limb at night only.  Pain left sole.  Spontaneous sensation of heat in feet.   Balance therapy helped balance but not sure if strengthening.    ROS - otherwise well. Ocular sicca. 3 alcoholic beverages per week. Smoker.    Mental state: Alert, appropriate, speech, language, and thought content normal.     Cranial nerves II-XII normal.    Sensory examination:     Right Left   Light touch ankle    Vibration (timed) K3 K3   Vibration (Rydell-Seiffer)     Temp     Pin DC    Pos     Legend:   MM = medial malleolus, TT = tibial tuberosity, K = patella, MCP = MCP joint  MF = mid-foot, DC = distal calf, MC = mid calf, PC = proximal calf      Motor examination:     Right Left   Shoulder abduction  5 5   Elbow extension 5 5   Elbow flexion 5 5   Wrist extension  5 5   Finger extension 5 5   FDI 5 5   APB 5 5   Hip flexion 5 5   Knee flexion 5 5   Knee extension 5 5   Dorsiflexion 0 0   Plantar flexion 0 0   A=atrophy    Tone normal     Reflexes:   Right Left   Biceps 0 0   BRD 0 0   Triceps 2 2   Patellar 2 2   Achilles 0 0   Plantar Flexor Flexor   Clonus Absent Absent      Coordination:  Finger-nose normal.  Heel-shin normal.  RRMs normal.    Gait:  Normal.      Impression:  Modest progression of sensory loss.  Positive sensory symptoms.   Entrapment neuropathies.    Recommendations:  Gait and balance therapy  LUCY, B12, MMA, A1c  EMG LUE: CTS vs radiculopathy      Adeel Verde M.D.      50 minutes spent on the date of the encounter on chart review, history and examination, documentation and further activities as noted above.

## 2024-02-27 NOTE — LETTER
2/27/2024         RE: Ai Boyer  [id number] [facility] [address]        Dear Colleague,    Thank you for referring your patient, Ai Boyer, to the [facility] [place]. Please see a copy of my visit note below.    Return visit for 63 year old woman with sensorimotor axonal neuropathy. Interim events:    Left ankle fusion. Recovering.  Paresthesias all fingers of left hand, worst in the thumb.  Positional numbness right upper limb at night only.  Pain left sole.  Spontaneous sensation of heat in feet.   Balance therapy helped balance but not sure if strengthening.    ROS - otherwise well. Ocular sicca. 3 alcoholic beverages per week. Smoker.    Mental state: Alert, appropriate, speech, language, and thought content normal.     Cranial nerves II-XII normal.    Sensory examination:     Right Left   Light touch ankle    Vibration (timed) K3 K3   Vibration (Rydell-Seiffer)     Temp     Pin DC    Pos     Legend:   MM = medial malleolus, TT = tibial tuberosity, K = patella, MCP = MCP joint  MF = mid-foot, DC = distal calf, MC = mid calf, PC = proximal calf      Motor examination:     Right Left   Shoulder abduction  5 5   Elbow extension 5 5   Elbow flexion 5 5   Wrist extension  5 5   Finger extension 5 5   FDI 5 5   APB 5 5   Hip flexion 5 5   Knee flexion 5 5   Knee extension 5 5   Dorsiflexion 0 0   Plantar flexion 0 0   A=atrophy    Tone normal     Reflexes:   Right Left   Biceps 0 0   BRD 0 0   Triceps 2 2   Patellar 2 2   Achilles 0 0   Plantar Flexor Flexor   Clonus Absent Absent      Coordination:  Finger-nose normal.  Heel-shin normal.  RRMs normal.    Gait:  Normal.      Impression:  Modest progression of sensory loss.  Positive sensory symptoms.   Entrapment neuropathies.    Recommendations:  Gait and balance therapy  LUCY, B12, MMA, A1c  EMG LUE: CTS vs radiculopathy      Adeel Verde M.D.      50 minutes spent on the date of the encounter on chart review, history  and examination, documentation and further activities as noted above.              Again, thank you for allowing me to participate in the care of your patient.        Sincerely,        Adeel Verde MD

## 2024-02-28 LAB — ANA SER QL IF: NEGATIVE

## 2024-02-29 LAB — METHYLMALONATE SERPL-SCNC: <0.1 UMOL/L (ref 0–0.4)

## 2024-03-01 LAB — PYRIDOXAL PHOS SERPL-SCNC: 287.3 NMOL/L

## 2024-03-06 ENCOUNTER — TELEPHONE (OUTPATIENT)
Dept: NEUROLOGY | Facility: CLINIC | Age: 64
End: 2024-03-06

## 2024-03-06 ENCOUNTER — THERAPY VISIT (OUTPATIENT)
Dept: PHYSICAL THERAPY | Facility: CLINIC | Age: 64
End: 2024-03-06
Attending: PSYCHIATRY & NEUROLOGY
Payer: COMMERCIAL

## 2024-03-06 DIAGNOSIS — R26.89 IMPAIRMENT OF BALANCE: Primary | ICD-10-CM

## 2024-03-06 DIAGNOSIS — G60.9 IDIOPATHIC NEUROPATHY: ICD-10-CM

## 2024-03-06 PROCEDURE — 97161 PT EVAL LOW COMPLEX 20 MIN: CPT | Mod: GP | Performed by: PHYSICAL MEDICINE & REHABILITATION

## 2024-03-06 PROCEDURE — 97112 NEUROMUSCULAR REEDUCATION: CPT | Mod: GP | Performed by: PHYSICAL MEDICINE & REHABILITATION

## 2024-03-06 NOTE — PROGRESS NOTES
PHYSICAL THERAPY EVALUATION  Type of Visit: Evaluation    See electronic medical record for Abuse and Falls Screening details.    Subjective   Pt arrived to PT today for neuropathy, weakness, and impaired balance secondary to ankle surgeries. Pt reports she has had B ODILON. Shared she has subtalar fusion on R. Pt also recently had subtalar fusino on L 9/11/23. Had revision 2 weeks ago. Shared she has sensory loss of B UE as well. Is planning to have EMG soon to assess nerve function. Has tried PT in the past to work on balance. Looking today to improve gait and impaired balance.  Presenting condition or subjective complaint: Referred by Dr eVrde to assess strength and balance issues  Date of onset: 09/11/23 (surgery on L ankle)   Relevant medical history: Arthritis; Cold or hot arm or leg; Foot drop; History of fractures; Implanted device; Menopause; Numbness or tingling in perianal area; Overweight; Pain at night or rest; Progressive neurological deficits; Significant weakness; Smoking     Prior diagnostic imaging/testing results: MRI; EMG; Other DNA testing   Prior therapy history for the same diagnosis, illness or injury: Yes PT post surgeries on and off since 9 2022    Prior Level of Function  Transfers: Independent  Ambulation:  usually uses crutch or walker, or will furniture walk around home  ADL: Independent    Living Environment  Social support: With a significant other or spouse   Type of home: House; 2-story   Stairs to enter the home: Yes       Ramp: No   Stairs inside the home: Yes 13 Is there a railing: Yes   Help at home: Home management tasks (cooking, cleaning)  Equipment owned: Walker; Crutches; Bath bench     Employment: Yes Behavior Analyst  Hobbies/Interests: socializing reading being outdoors    Patient goals for therapy: Balance and walk without tripping     Objective      Cognitive Status Examination  Orientation: Oriented to person, place and time   Level of Consciousness: Alert  Follows  Commands and Answers Questions: 100% of the time  Personal Safety and Judgement: Intact  Memory: Intact    INTEGUMENTARY: notes compression stocking on R LE from previous surgeries and lymphedema, swelling still present on L LE from recent surgery 2 weeks ago  POSTURE: Sitting Posture: Rounded shoulders, Forward head  RANGE OF MOTION:  WFL  STRENGTH: WFL    BED MOBILITY: Independent    TRANSFERS: Independent    WHEELCHAIR MOBILITY: N/A    GAIT:   Level of [place]: SBA  Assistive Device(s): Crutch (one), boot on L  Gait Deviations:  pt compensates decreased L ankle DF with L hip flexion, poor knee terminal extension, 1 axillary crutch on R and boot on L  Gait Distance: 40'  Stairs: see FGA    BALANCE:   TUG 1 (classic): 17.53 seconds  TUG 2 (cup): 13.84 seconds  TUG 3 (reciting states): 14.45 seconds    Mod CTSIB  Condition 1: 30 seconds  Condition 2: 6 seconds  Condition 3: 30 seconds  Condition 4: 7 seconds    FGA: 12/30    SLS: held due to difficulty with prior balance exercises. Will assess as appropriate at upcoming visits    SENSATION: notes impaired sensation of B UE and LE      Assessment & Plan   CLINICAL IMPRESSIONS  Medical Diagnosis: Idiopathic neuropathy    Treatment Diagnosis: impaired gait; impaired balance  Impression/Assessment: Patient is a 63 year old female with impaired balance and impaired gait complaints.  The following significant findings have been identified: Pain, Decreased ROM/flexibility, Decreased joint mobility, Decreased strength, Impaired balance, Decreased proprioception, Impaired sensation, Edema, Impaired gait, Impaired muscle performance, Decreased activity tolerance, and Impaired posture. These impairments interfere with their ability to perform self care tasks, work tasks, recreational activities, household chores, driving , household mobility, and community mobility as compared to previous level of function.     Clinical Decision Making (Complexity):  Clinical  Presentation: Stable/Uncomplicated  Clinical Presentation Rationale: based on medical and personal factors listed in PT evaluation  Clinical Decision Making (Complexity): Low complexity    PLAN OF CARE  Treatment Interventions:  Modalities: Cryotherapy, E-stim, Hot Pack, Mechanical Traction, Ultrasound, TENS  Interventions: Gait Training, Manual Therapy, Neuromuscular Re-education, Therapeutic Activity, Therapeutic Exercise, Self-Care/Home Management    Long Term Goals  PT Goal 1  Goal Identifier: 1  Goal Description: Pt will be able to perform all aspects of mod CTSIB for 20 seconds, indicating improved balance, and decreased risk of falls  Target Date: 04/03/24  PT Goal 2  Goal Identifier: 2  Goal Description: Pt will be able to perform all 3 TUGs in less than 13 seconds, indicating decreased risk of falls  Target Date: 04/17/24  PT Goal 3  Goal Identifier: 3  Goal Description: Pt will be independent with HEP in order to self manage symptoms  Target Date: 05/01/24  PT Goal 4  Goal Identifier: 4  Goal Description: Pt will improve FGA by 10 points, indicating decreased risk of falls  Target Date: 05/01/24    Frequency of Treatment: 1x/week  Duration of Treatment: 8 weeks    Recommended Referrals to Other Professionals:  none  Education Assessment:   Learner/Method: Patient;Listening;Reading;Demonstration;Pictures/Video  Education Comments: pt demonstrated and verbalized good understanding    Risks and benefits of evaluation/treatment have been explained.   Patient/Family/caregiver agrees with Plan of Care.     Evaluation Time:   PT Eval, Low Complexity Minutes (65666): 40   Present: Not applicable     Signing Clinician: Chelo Potter PT    Please contact me with any questions or concerns.    Thank you for your referral,     Chelo Potter, PT, DPT  Physical Therapist  [address] [address] [phone]

## 2024-03-06 NOTE — TELEPHONE ENCOUNTER
Writer called to set up 1 Year follow up from 2/27 date of 2023.   Dr Verde ordered a 2 Year follow up and writer called to schedule for 2025 return visit due to template restrictions.  LVM for patient to antelmo back and discuss.  REHAN Ansari., SUSANNE ([facility])

## 2024-03-13 ENCOUNTER — THERAPY VISIT (OUTPATIENT)
Dept: PHYSICAL THERAPY | Facility: CLINIC | Age: 64
End: 2024-03-13
Attending: PSYCHIATRY & NEUROLOGY
Payer: COMMERCIAL

## 2024-03-13 DIAGNOSIS — R26.89 IMPAIRMENT OF BALANCE: Primary | ICD-10-CM

## 2024-03-13 PROCEDURE — 97112 NEUROMUSCULAR REEDUCATION: CPT | Mod: GP | Performed by: PHYSICAL MEDICINE & REHABILITATION

## 2024-03-14 NOTE — TELEPHONE ENCOUNTER
I called Ai and re-iterated the information about B6 and Sjogren's syndrome, with a bit more detail. Regarding weight, I referred her to discuss that with PCP.    Adeel Verde M.D.

## 2024-03-14 NOTE — TELEPHONE ENCOUNTER
"Writer called patient to schedule follow up with Dr Verde. Patient wanted to wait till 2026 and will be seeing Dr Verde for an EMG in July where the patient will discuss changing the reschedule date to follow up with Dr Verde.    Patient went to another provider who brought up the possibility of having Sjogren's disease and the patient was wondering if there a blood test that had been done to rule out Sjogren's due to the patient having dry eyes.  Patient also told writer that she has stopped taking the B6 supplement because her hands \" felt different \" with  more numbness and tingling.   Her B6 lab came back at 287.3 and the last B6 lab taken two years ago  was at 181.4  Writer checked chart and concurred.   Patient was wondering if it was ok to stop the supplement.  Writer told patient that some B vitamin lab tests can come back high due to the nature of the test.   Patient told writer that last A1c was prediabetic at 5.8. Patient has been adjusting diet to keep sugars under control.  Writer talked weight loss with the patient and brought up making better choices, slowing down to completely chew food and take at least 20 min to eat, keeping the meal more protein and vegetable based. Not drinking 20 min before and 20 after a meal can aid in staying full for a longer period of time and will decrease cravings for carbs.  Patient was stressing about disability and job and possibility of being let go due to the patient's ailment.  Writer forwarding to Dr Verde for advisement    SEPIDEH Ansari, SUSANNE ([facility])    "

## 2024-03-16 ENCOUNTER — HEALTH MAINTENANCE LETTER (OUTPATIENT)
Age: 64
End: 2024-03-16

## 2024-03-20 ENCOUNTER — THERAPY VISIT (OUTPATIENT)
Dept: PHYSICAL THERAPY | Facility: CLINIC | Age: 64
End: 2024-03-20
Attending: PSYCHIATRY & NEUROLOGY
Payer: COMMERCIAL

## 2024-03-20 DIAGNOSIS — R26.89 IMPAIRMENT OF BALANCE: Primary | ICD-10-CM

## 2024-03-20 PROCEDURE — 97112 NEUROMUSCULAR REEDUCATION: CPT | Mod: GP | Performed by: PHYSICAL MEDICINE & REHABILITATION

## 2024-03-27 ENCOUNTER — THERAPY VISIT (OUTPATIENT)
Dept: PHYSICAL THERAPY | Facility: CLINIC | Age: 64
End: 2024-03-27
Attending: PSYCHIATRY & NEUROLOGY
Payer: COMMERCIAL

## 2024-03-27 DIAGNOSIS — R26.89 IMPAIRMENT OF BALANCE: Primary | ICD-10-CM

## 2024-03-27 PROCEDURE — 97112 NEUROMUSCULAR REEDUCATION: CPT | Mod: GP | Performed by: PHYSICAL MEDICINE & REHABILITATION

## 2024-04-03 ENCOUNTER — THERAPY VISIT (OUTPATIENT)
Dept: PHYSICAL THERAPY | Facility: CLINIC | Age: 64
End: 2024-04-03
Attending: PSYCHIATRY & NEUROLOGY
Payer: COMMERCIAL

## 2024-04-03 DIAGNOSIS — R26.89 IMPAIRMENT OF BALANCE: Primary | ICD-10-CM

## 2024-04-03 PROCEDURE — 97112 NEUROMUSCULAR REEDUCATION: CPT | Mod: GP | Performed by: PHYSICAL MEDICINE & REHABILITATION

## 2024-04-03 PROCEDURE — 97110 THERAPEUTIC EXERCISES: CPT | Mod: GP | Performed by: PHYSICAL MEDICINE & REHABILITATION

## 2024-04-17 ENCOUNTER — THERAPY VISIT (OUTPATIENT)
Dept: PHYSICAL THERAPY | Facility: CLINIC | Age: 64
End: 2024-04-17
Attending: PSYCHIATRY & NEUROLOGY
Payer: COMMERCIAL

## 2024-04-17 DIAGNOSIS — R26.89 IMPAIRMENT OF BALANCE: Primary | ICD-10-CM

## 2024-04-17 PROCEDURE — 97110 THERAPEUTIC EXERCISES: CPT | Mod: GP | Performed by: PHYSICAL MEDICINE & REHABILITATION

## 2024-05-23 ENCOUNTER — TELEPHONE (OUTPATIENT)
Dept: CONSULT | Facility: CLINIC | Age: 64
End: 2024-05-23
Payer: COMMERCIAL

## 2024-05-25 ENCOUNTER — HEALTH MAINTENANCE LETTER (OUTPATIENT)
Age: 64
End: 2024-05-25

## 2024-06-11 NOTE — PROGRESS NOTES
Outpatient Physical Therapy Discharge Note     Patient: Ai Boyer  : 1960    Beginning/End Dates of Reporting Period:  3/6/24 to 24    Referring Provider: Adeel Verde MD    Therapy Diagnosis:  impaired gait; impaired balance     Patient did not return for follow up treatments.  Goal status and current objective information is therefore unknown.  Discharge from PT services at this time for this episode of treatment. Please see attached documentation under this episode of care for further information including dates of service, start of care date, referring physician, Dx, treatment plan, treatments, etc.    Please contact me with any questions or concerns.    Thank you for your referral.    Chelo Potter, PT, DPT  Physical Therapist  [address] [address] [phone]

## 2024-07-01 ENCOUNTER — OFFICE VISIT (OUTPATIENT)
Dept: NEUROLOGY | Facility: CLINIC | Age: 64
End: 2024-07-01
Attending: PSYCHIATRY & NEUROLOGY
Payer: COMMERCIAL

## 2024-07-01 DIAGNOSIS — G56.12 LEFT MEDIAN NERVE NEUROPATHY: Primary | ICD-10-CM

## 2024-07-01 DIAGNOSIS — G60.9 IDIOPATHIC NEUROPATHY: ICD-10-CM

## 2024-07-01 PROCEDURE — 95908 NRV CNDJ TST 3-4 STUDIES: CPT | Performed by: PSYCHIATRY & NEUROLOGY

## 2024-07-01 PROCEDURE — 95886 MUSC TEST DONE W/N TEST COMP: CPT | Performed by: PSYCHIATRY & NEUROLOGY

## 2024-07-01 NOTE — LETTER
2024      [place] Boyer  [address] [facility] [address]      Dear Colleague,    Thank you for referring your patient, Ai Boyer, to the [facility] NEUROLOGY CLIN[facility] [place]. Please see a copy of my visit note below.                        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     Techniques:  Motor conduction studies were done with surface recording electrodes. Sensory conduction studies were performed with surface electrodes, unless indicated otherwise by (n), designating the use of subdermal recording electrodes. Temperature was monitored and recorded throughout the study. Upper extremities were maintained at a temperature of 32 degrees Centigrade or higher.  EMG was done with a concentric needle electrode.     Results:  Left median and ulnar sensory and mixed nerve conduction studies demonstrated mild attenuation of median sensory nerve action potential amplitude and moderately severe slowing of median conduction. Left median motor conduction studies demonstrated moderately severe prolongation of distal latency and forearm conduction. Left ulnar motor conduction studies were normal. Electromyography of the left upper limb was normal.     Interpretation:  This is an abnormal study, demonstrating electrophysiologic evidence of the following:  Moderately severe left median neuropathy at the wrist.  Left median neuropathy in the forearm is  not excluded.  No evidence of left cervical radiculopathy.    _____________________________  Adeel Verde MD  Board Certified in Clinical Neurophysiology and Neuromuscular Medicine        Nerve Conduction Studies  Motor Sites      Latency Amplitude Neg. Amp Diff Segment Distance Velocity Neg. Dur Neg Area Diff Temperature Comment   Site (ms) Norm (mV) Norm (%)  cm m/s Norm (ms) (%) ( C)    Left Median (APB) Motor   Wrist 6.8  < 4.4 5.8  > 5.0  Wrist-APB 8   6.0  32.9    Elbow 12.6 - 5.3  > 5.0 -9 Elbow-Wrist 21.5 37  > 48 6.7 -2 32.8    Left Median/Ulnar (Lumb-Dors Int II) Motor        Median (Lumb I)   Wrist 5.8 - 1.08 -  Wrist-Lumb I 10   6.5  32.7         Ulnar (Dorsal Int (manus))   Wrist 3.7 - 5.0 -  Wrist-Dorsal Int (manus) 10   5.5  33    Left Ulnar (ADM) Motor   Wrist 3.4  < 3.5 10.4  > 5.0  Wrist-ADM 8   4.7  32.7    Bel Elbow 7.6 - 9.0 - -13 Bel Elbow-Wrist 20.5 49  > 48 5.1 -7 32.7    Abv Elbow 9.5 - 8.7 - -3 Abv Elbow-Bel Elbow 9.5 50  > 48 5.3 -3 32.7      F-Wave Sites      Min F-Lat Max-Min F-Lat Mean F-Lat   Site (ms) (ms) (ms)   Left Ulnar F-Wave   Wrist 32.5 3.0 33.3     Sensory Sites      Onset Lat Peak Lat Amp (O-P) Amp (P-P) Segment Distance Velocity Temperature Comment   Site ms (ms)  V Norm ( V)  cm m/s Norm ( C)    Left Median Sensory   Wrist-Dig II 4.6 6.1 6  > 10 8 Wrist-Dig II 14 30  > 48 33.1    Left Median-Ulnar Palmar Sensory        Median   Palm-Wrist 3.1 4.1 14 - 57 Palm-Wrist 8 26 - 32.7         Ulnar   Palm-Wrist 1.68 2.2 6 - 24 Palm-Wrist 8 48 - 32.6    Left Ulnar Sensory   Wrist-Dig V 2.4 3.7 9  > 8 24 Wrist-Dig V 12.5 52  > 48 33.4      Inter-Nerve Comparisons     Nerve 1 Value 1 Nerve 2 Value 2 Parameter Result Normal   Sensory Sites   L Median Palm-Wrist 4.1 ms L Ulnar Palm-Wrist 2.2 ms Peak Lat Diff 1.90 ms <0.40       Electromyography     Side Muscle Ins Act Fibs/PSW Fasc HF Amp Dur Poly Recrt Int Pat   Left Pronator Teres Nml None Nml 0 Nml Nml 0 Nml Nml   Left Biceps Nml None  Nml 0 Nml Nml 0 Nml Nml   Left EDC Nml None Nml 0 Nml Nml 0 Nml Nml   Left FDI Nml None Nml 0 Nml Nml 0 Nml Nml   Left Triceps Nml None Nml 0 Nml Nml 0 Nml Nml   Left C7 Parasp Nml None Nml 0              NCS Waveforms:    Motor           Sensory           F-Wave                   Reviewed results with patient. Will refer to hand specialist and for left median ultrasound.       Again, thank you for allowing me to participate in the care of your patient.        Sincerely,        Adeel Verde MD

## 2024-07-01 NOTE — PROGRESS NOTES
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     Techniques:  Motor conduction studies were done with surface recording electrodes. Sensory conduction studies were performed with surface electrodes, unless indicated otherwise by (n), designating the use of subdermal recording electrodes. Temperature was monitored and recorded throughout the study. Upper extremities were maintained at a temperature of 32 degrees Centigrade or higher.  EMG was done with a concentric needle electrode.     Results:  Left median and ulnar sensory and mixed nerve conduction studies demonstrated mild attenuation of median sensory nerve action potential amplitude and moderately severe slowing of median conduction. Left median motor conduction studies demonstrated moderately severe prolongation of distal latency and forearm conduction. Left ulnar motor conduction studies were normal. Electromyography of the left upper limb was normal.     Interpretation:  This is an abnormal study, demonstrating electrophysiologic evidence of the following:  Moderately severe left median neuropathy at the wrist.  Left median neuropathy in the forearm is not excluded.  No evidence of left cervical radiculopathy.    _____________________________  Adeel Verde MD  Board Certified in Clinical Neurophysiology and Neuromuscular Medicine        Nerve Conduction Studies  Motor Sites      Latency Amplitude  Neg. Amp Diff Segment Distance Velocity Neg. Dur Neg Area Diff Temperature Comment   Site (ms) Norm (mV) Norm (%)  cm m/s Norm (ms) (%) ( C)    Left Median (APB) Motor   Wrist 6.8  < 4.4 5.8  > 5.0  Wrist-APB 8   6.0  32.9    Elbow 12.6 - 5.3  > 5.0 -9 Elbow-Wrist 21.5 37  > 48 6.7 -2 32.8    Left Median/Ulnar (Lumb-Dors Int II) Motor        Median (Lumb I)   Wrist 5.8 - 1.08 -  Wrist-Lumb I 10   6.5  32.7         Ulnar (Dorsal Int (manus))   Wrist 3.7 - 5.0 -  Wrist-Dorsal Int (manus) 10   5.5  33    Left Ulnar (ADM) Motor   Wrist 3.4  < 3.5 10.4  > 5.0  Wrist-ADM 8   4.7  32.7    Bel Elbow 7.6 - 9.0 - -13 Bel Elbow-Wrist 20.5 49  > 48 5.1 -7 32.7    Abv Elbow 9.5 - 8.7 - -3 Abv Elbow-Bel Elbow 9.5 50  > 48 5.3 -3 32.7      F-Wave Sites      Min F-Lat Max-Min F-Lat Mean F-Lat   Site (ms) (ms) (ms)   Left Ulnar F-Wave   Wrist 32.5 3.0 33.3     Sensory Sites      Onset Lat Peak Lat Amp (O-P) Amp (P-P) Segment Distance Velocity Temperature Comment   Site ms (ms)  V Norm ( V)  cm m/s Norm ( C)    Left Median Sensory   Wrist-Dig II 4.6 6.1 6  > 10 8 Wrist-Dig II 14 30  > 48 33.1    Left Median-Ulnar Palmar Sensory        Median   Palm-Wrist 3.1 4.1 14 - 57 Palm-Wrist 8 26 - 32.7         Ulnar   Palm-Wrist 1.68 2.2 6 - 24 Palm-Wrist 8 48 - 32.6    Left Ulnar Sensory   Wrist-Dig V 2.4 3.7 9  > 8 24 Wrist-Dig V 12.5 52  > 48 33.4      Inter-Nerve Comparisons     Nerve 1 Value 1 Nerve 2 Value 2 Parameter Result Normal   Sensory Sites   L Median Palm-Wrist 4.1 ms L Ulnar Palm-Wrist 2.2 ms Peak Lat Diff 1.90 ms <0.40       Electromyography     Side Muscle Ins Act Fibs/PSW Fasc HF Amp Dur Poly Recrt Int Pat   Left Pronator Teres Nml None Nml 0 Nml Nml 0 Nml Nml   Left Biceps Nml None Nml 0 Nml Nml 0 Nml Nml   Left EDC Nml None Nml 0 Nml Nml 0 Nml Nml   Left FDI Nml None Nml 0 Nml Nml 0 Nml Nml   Left Triceps Nml None Nml 0 Nml Nml 0 Nml Nml   Left C7 Parasp Nml None Nml 0              NCS Waveforms:    Motor            Sensory           F-Wave

## 2024-07-09 DIAGNOSIS — G56.12 LEFT MEDIAN NERVE NEUROPATHY: ICD-10-CM

## 2024-08-15 ENCOUNTER — OFFICE VISIT (OUTPATIENT)
Dept: NEUROLOGY | Facility: CLINIC | Age: 64
End: 2024-08-15
Payer: COMMERCIAL

## 2024-08-15 DIAGNOSIS — G56.02 CARPAL TUNNEL SYNDROME OF LEFT WRIST: Primary | ICD-10-CM

## 2024-08-15 PROCEDURE — 76882 US LMTD JT/FCL EVL NVASC XTR: CPT | Mod: LT | Performed by: PSYCHIATRY & NEUROLOGY

## 2024-08-15 NOTE — PROGRESS NOTES
Neuromuscular Ultrasound Report   Patient: Ai Boyer  Patient ID: [id number]  YOB: 1960  Age: 64 years  Referring Physician: Adeel Verde MD  History & Examination:   64-year-old woman with history of numbness and tingling in both hands.  A recent EMG study showed a left median neuropathy at the wrist, consistent with carpal tunnel syndrome, but there was also slowing of the left median motor nerve at the forearm (37 m/s), without conduction block.  Needle EMG of median nerve innervated muscles proximally to the wrist was unremarkable.  Ultrasonographic study was requested to evaluate for focal median nerve pathology at the wrist, and/or more proximal locations.  Techniques:   Ultrasound of the left median nerve from the distal wrist crease to the upper arm was performed with a 15 Mhz transducer.   Results:   The left median nerve showed increased cross-sectional area (CSA), reduced echogenicity, and disturbed fascicular architecture at the wrist crease (carpal tunnel).  CSA was 0.15 cm  with normal values being less than 0.11 cm .  Long-axis images showed hypoechoic signal of the nerve upon entering the carpal tunnel and mild compression by thickened transverse carpal ligament.  There was no abnormal muscle, tendon, ganglion cyst, or nerve tumor at that location.  The left median nerve showed normal CSA, echogenicity and fascicular architecture at all sites imaged at the forearm, including the mid forearm, pronator tunnel, elbow under the lacertus fibrosis, as well as upper arm and axilla.  Images were stored and are available for review.   Interpretation:   Abnormal study.  There is ultrasonographic evidence of left median neuropathy at the wrist, as seen with carpal tunnel syndrome.  There is no ultrasonographic evidence of proximal left median mononeuropathy.  Ultrasonographer:   Ronald Escobar MD, FAAN

## 2024-08-15 NOTE — LETTER
8/15/2024       RE: Ai Boyer  [address]  [facility] [address]     Dear Colleague,    Thank you for referring your patient, Ai Boyer, to the [facility] EMG CLIN[facility] [place] at [facility]. Please see a copy of my visit note below.    Neuromuscular Ultrasound Report   Patient: Ai Boyer  Patient ID: [id number]  YOB: 1960  Age: 64 years  Referring Physician: Adeel Verde MD  History & Examination:   64-year-old woman with history of numbness and tingling in both hands.  A recent EMG study showed a left median neuropathy at the wrist, consistent with carpal tunnel syndrome, but there was also slowing of the left median motor nerve at the forearm (37 m/s), without conduction block.  Needle EMG of median nerve innervated muscles proximally to the wrist was unremarkable.  Ultrasonographic study was requested to evaluate for focal median nerve pathology at the wrist, and/or more proximal locations.  Techniques:   Ultrasound of the left median nerve from the distal wrist crease to the upper arm was performed with a 15 Mhz transducer.   Results:   The left median nerve showed increased cross-sectional area (CSA), reduced echogenicity, and disturbed fascicular architecture at the wrist crease (carpal tunnel).  CSA was 0.15 cm  with normal values being less than 0.11 cm .  Long-axis images showed hypoechoic signal of the nerve upon entering the carpal tunnel and mild compression by thickened transverse carpal ligament.  There was no abnormal muscle, tendon, ganglion cyst, or nerve tumor at that location.  The left median nerve showed normal CSA, echogenicity and fascicular architecture at all sites imaged at the forearm, including the mid forearm, pronator tunnel, elbow under the lacertus fibrosis, as well as upper arm and axilla.  Images were stored and are available for review.   Interpretation:   Abnormal study.  There is  ultrasonographic evidence of left median neuropathy at the wrist, as seen with carpal tunnel syndrome.  There is no ultrasonographic evidence of proximal left median mononeuropathy.  Ultrasonographer:   Ronald Escobar MD, FAAN           Again, thank you for allowing me to participate in the care of your patient.      Sincerely,    Ronald Escobar MD

## 2024-08-20 ENCOUNTER — OFFICE VISIT (OUTPATIENT)
Dept: ORTHOPEDICS | Facility: CLINIC | Age: 64
End: 2024-08-20
Attending: PSYCHIATRY & NEUROLOGY
Payer: COMMERCIAL

## 2024-08-20 DIAGNOSIS — G56.12 LEFT MEDIAN NERVE NEUROPATHY: ICD-10-CM

## 2024-08-20 PROCEDURE — 99204 OFFICE O/P NEW MOD 45 MIN: CPT | Performed by: PLASTIC SURGERY

## 2024-08-20 NOTE — LETTER
2024      [place] Boy[place]  [address] [facility] [address]      Dear Colleague,    Thank you for referring your patient, Ai Boyer, to the [facility]. Please see a copy of my visit note below.    Referring Provider:  Adeel Verde MD  [address] [facility] [id number]  [place] [address]     Primary Care Provider:  Aly Amaya      RE: Ai Boyer.  : 1960.  NILS: 2024.    Reason for visit: Bilateral hand numbness and tingling     HPI: LHD.  Since the age of 21s she has had peripheral neuropathy of unknown etiology.  In the last few years the patient has noticed worsening numbness tingling in the median innervated fingers especially at night.  Workup has shown moderately severe carpal tunnel syndrome on the left side.  Right side was not tested.  Although she has similar symptoms on the right.  8 years of median innervated numbness and tingling. No trauma. No neck pain. No radiculopathy. No steroids.     Medical history:  Past Medical History:   Diagnosis Date     Arthritis      At risk for healthcare associated infection 2019     Foot drop, bilateral      IBS (irritable bowel syndrome)      Obese      Peripheral neuropathy        Surgical history:  Past Surgical History:   Procedure Laterality Date      knee arthroscopy  Left     meniscus repair     ARTHRODESIS ANKLE  2012    Procedure:ARTHRODESIS ANKLE; right ankle medial closing wedge ostetomy intermedually nailing removal of hardware; Surgeon:JOSE DANIEL DALAL; Location: [facility]     ARTHRODESIS ANKLE Left 2022    Procedure: TIBIOTALOCALCANEAL FUSION LEFT ANKLE;  Surgeon: Jose Daniel Dalal MD;  Location:  OR     ARTHROPLASTY REVISION HIP Left 2019    Procedure: Revision left total hip arthroplasty;  Surgeon: More Stephens MD;  Location: [facility] OR      SECTION       GYN SURGERY      oopherectomy     ORTHOPEDIC SURGERY Left     left hip      REPAIR TENDON ANKLE Left  9/11/2023    Procedure: LEFT TIBIALIS ANTERIOR TENODESIS AND LEFT BONE GRAFT SUBTALAR JOINT;  Surgeon: Jose Daniel Dalal MD;  Location: [facility] OR     right ankle fusion  Right        Family history:  Family History   Problem Relation Age of Onset     Multiple Sclerosis Mother      Hypertension Father        Medications:  Current Outpatient Medications   Medication Sig Dispense Refill     azithromycin (ZITHROMAX) 500 MG tablet Take 500 mg by mouth once as needed (one hour prior to dental appointments)       Cholecalciferol (VITAMIN D3) 50 MCG (2000 UT) CAPS Take 1 capsule by mouth daily       hydrOXYzine (ATARAX) 25 MG tablet Take 1 tablet by mouth daily as needed       omeprazole (PRILOSEC) 40 MG DR capsule Take 40 mg by mouth       senna-docusate (SENOKOT-S/PERICOLACE) 8.6-50 MG tablet Take 1-2 tablets by mouth 2 times daily 30 tablet 0     vitamin C (ASCORBIC ACID) 1000 MG TABS Take 1,000 mg by mouth daily       zinc gluconate 50 MG tablet Take 50 mg by mouth daily         Allergies:  Allergies   Allergen Reactions     Sporanox [Itraconazole] Anaphylaxis     Amoxicillin Itching     Ceftin Hives     Clindamycin Hcl Itching     Codeine Sulfate Nausea     Lisinopril Cough     Morphine Hcl Hives     Vancomycin Other (See Comments)     Lightheaded, red chest shoulders, pain left foot       Hydrocodone-Acetaminophen Rash     Keflex [Cephalexin Hcl] Rash       Social history:   Social History     Tobacco Use     Smoking status: Every Day     Current packs/day: 1.00     Types: Cigarettes     Smokeless tobacco: Never     Tobacco comments:     1-pack daily    Substance Use Topics     Alcohol use: Yes     Comment: 1 - 4 times per week         Physical Examination:  There were no vitals taken for this visit.  There is no height or weight on file to calculate BMI.    General: No acute distress.    HAND EXAM: On examination of her hand, she has no obvious atrophy of the right hand.  Her 2-point discrimination in the median  innervated finger is between 9 and 10 mm and in the left ulnar side is about 5-6 mm.  Tinel is positive.  Carpal compression is positive and Phalen is positive.  She can oppose her thumb.    Nerve conduction tests done this year showed moderately severe left sided carpal tunnel syndrome.  Ultrasound also shows compression of the nerve in the wrist.  No evidence for proximal median nerve compression on the ultrasound.        ASSESMENT and PLAN:     Based upon the above findings, a diagnosis of left carpal tunnel syndrome in addition to peripheral neuropathy was made.  I had a hal, detailed discussion with the patient, in the presence of my nurse, who was present from beginning to end.  I discussed with the patient the pathophysiology behind the problem, the concept behind the procedure/treatment proposed, expectations of the planned procedure(s), and all anaya-operative steps.     In my opinion, she is a good candidate for release of the carpal tunnel syndrome to prevent further degradation in function and to potentially help with the symptoms.  Whether or not her symptoms improve or regress only time will tell, but I cannot guarantee that will happen.  Most importantly, we will try and prevent further degradation in muscular function of the thumb.  I explained to her how the surgery will be done, where the scars will be, and what to expect.  Risks of pain, infection, bleeding, scarring, asymmetry, seromas, hematomas, wound breakdown, wound dehiscence, stiffness, CRPS, injury to deeper structures like nerves and vessels, requirement of further surgeries, inability to promise all her symptoms will go away, DVT, PE, MI, CVA, pneumonia, renal failure and death.  They were understood and agreed upon by the patient, they were acknowledged by the patient, all the patient's questions were answered in detail to the patient's fullest understanding that they acknowledged, the team approach for treatment in the operating room  was agreed upon by the patient, and proceeding with surgery was agreed upon by the patient.  We will schedule her as soon as possible.  All her questions answered.  She was happy with the visit.    Advised to quit smoking as it would decrease risks.      After our discussion, the patient is inclined towards left carpal tunnel release.    All questions were answered. The patient was happy with the visit. I look forward to helping the patient out in the near future as indicated.       Total time spent in the encounter today including chart review, visit itself, and post-visit paperwork was 45 minutes.       Rody Gaitan MD    Chief, Division of Plastic Surgery  Department of Surgery  [facility]      CC: Adeel Verde MD  [address] [facility] [id number]  [place] [address]  CC: Aly Amaya            Again, thank you for allowing me to participate in the care of your patient.        Sincerely,        ROXANA Gaitan MD

## 2024-08-20 NOTE — PROGRESS NOTES
Referring Provider:  Adeel Verde MD  [address] [facility] [id number]  [place] [address]     Primary Care Provider:  Aly Amaya      RE: Ai Boyer.  : 1960.  NILS: 2024.    Reason for visit: Bilateral hand numbness and tingling     HPI: LHD.  Since the age of 21s she has had peripheral neuropathy of unknown etiology.  In the last few years the patient has noticed worsening numbness tingling in the median innervated fingers especially at night.  Workup has shown moderately severe carpal tunnel syndrome on the left side.  Right side was not tested.  Although she has similar symptoms on the right.  8 years of median innervated numbness and tingling. No trauma. No neck pain. No radiculopathy. No steroids.     Medical history:  Past Medical History:   Diagnosis Date    Arthritis     At risk for healthcare associated infection 2019    Foot drop, bilateral     IBS (irritable bowel syndrome)     Obese     Peripheral neuropathy        Surgical history:  Past Surgical History:   Procedure Laterality Date     knee arthroscopy  Left     meniscus repair    ARTHRODESIS ANKLE  2012    Procedure:ARTHRODESIS ANKLE; right ankle medial closing wedge ostetomy intermedually nailing removal of hardware; Surgeon:JOSE DANIEL DALAL; Location: [facility]    ARTHRODESIS ANKLE Left 2022    Procedure: TIBIOTALOCALCANEAL FUSION LEFT ANKLE;  Surgeon: Jose Daniel Dalal MD;  Location:  OR    ARTHROPLASTY REVISION HIP Left 2019    Procedure: Revision left total hip arthroplasty;  Surgeon: More Stephens MD;  Location: [facility] OR     SECTION      GYN SURGERY      oopherectomy    ORTHOPEDIC SURGERY Left     left hip     REPAIR TENDON ANKLE Left 2023    Procedure: LEFT TIBIALIS ANTERIOR TENODESIS AND LEFT BONE GRAFT SUBTALAR JOINT;  Surgeon: Jose Daniel Dalal MD;  Location:  OR    right ankle fusion  Right        Family history:  Family History   Problem Relation Age of Onset     Multiple Sclerosis Mother     Hypertension Father        Medications:  Current Outpatient Medications   Medication Sig Dispense Refill    azithromycin (ZITHROMAX) 500 MG tablet Take 500 mg by mouth once as needed (one hour prior to dental appointments)      Cholecalciferol (VITAMIN D3) 50 MCG (2000 UT) CAPS Take 1 capsule by mouth daily      hydrOXYzine (ATARAX) 25 MG tablet Take 1 tablet by mouth daily as needed      omeprazole (PRILOSEC) 40 MG DR capsule Take 40 mg by mouth      senna-docusate (SENOKOT-S/PERICOLACE) 8.6-50 MG tablet Take 1-2 tablets by mouth 2 times daily 30 tablet 0    vitamin C (ASCORBIC ACID) 1000 MG TABS Take 1,000 mg by mouth daily      zinc gluconate 50 MG tablet Take 50 mg by mouth daily         Allergies:  Allergies   Allergen Reactions    Sporanox [Itraconazole] Anaphylaxis    Amoxicillin Itching    Ceftin Hives    Clindamycin Hcl Itching    Codeine Sulfate Nausea    Lisinopril Cough    Morphine Hcl Hives    Vancomycin Other (See Comments)     Lightheaded, red chest shoulders, pain left foot      Hydrocodone-Acetaminophen Rash    Keflex [Cephalexin Hcl] Rash       Social history:   Social History     Tobacco Use    Smoking status: Every Day     Current packs/day: 1.00     Types: Cigarettes    Smokeless tobacco: Never    Tobacco comments:     1-pack daily    Substance Use Topics    Alcohol use: Yes     Comment: 1 - 4 times per week         Physical Examination:  There were no vitals taken for this visit.  There is no height or weight on file to calculate BMI.    General: No acute distress.    HAND EXAM: On examination of her hand, she has no obvious atrophy of the right hand.  Her 2-point discrimination in the median innervated finger is between 9 and 10 mm and in the left ulnar side is about 5-6 mm.  Tinel is positive.  Carpal compression is positive and Phalen is positive.  She can oppose her thumb.    Nerve conduction tests done this year showed moderately severe left sided carpal  tunnel syndrome.  Ultrasound also shows compression of the nerve in the wrist.  No evidence for proximal median nerve compression on the ultrasound.        ASSESMENT and PLAN:     Based upon the above findings, a diagnosis of left carpal tunnel syndrome in addition to peripheral neuropathy was made.  I had a hal, detailed discussion with the patient, in the presence of my nurse, who was present from beginning to end.  I discussed with the patient the pathophysiology behind the problem, the concept behind the procedure/treatment proposed, expectations of the planned procedure(s), and all anaya-operative steps.     In my opinion, she is a good candidate for release of the carpal tunnel syndrome to prevent further degradation in function and to potentially help with the symptoms.  Whether or not her symptoms improve or regress only time will tell, but I cannot guarantee that will happen.  Most importantly, we will try and prevent further degradation in muscular function of the thumb.  I explained to her how the surgery will be done, where the scars will be, and what to expect.  Risks of pain, infection, bleeding, scarring, asymmetry, seromas, hematomas, wound breakdown, wound dehiscence, stiffness, CRPS, injury to deeper structures like nerves and vessels, requirement of further surgeries, inability to promise all her symptoms will go away, DVT, PE, MI, CVA, pneumonia, renal failure and death.  They were understood and agreed upon by the patient, they were acknowledged by the patient, all the patient's questions were answered in detail to the patient's fullest understanding that they acknowledged, the team approach for treatment in the operating room was agreed upon by the patient, and proceeding with surgery was agreed upon by the patient.  We will schedule her as soon as possible.  All her questions answered.  She was happy with the visit.    Advised to quit smoking as it would decrease risks.      After our  discussion, the patient is inclined towards left carpal tunnel release.    All questions were answered. The patient was happy with the visit. I look forward to helping the patient out in the near future as indicated.       Total time spent in the encounter today including chart review, visit itself, and post-visit paperwork was 45 minutes.       Rody Gaitan MD    Chief, Division of Plastic Surgery  Department of Surgery  [facility]      CC: Adeel Verde MD  [address] [id number]  [place] [address]  CC: Aly Amaya

## 2024-08-21 ENCOUNTER — TELEPHONE (OUTPATIENT)
Dept: ORTHOPEDICS | Facility: CLINIC | Age: 64
End: 2024-08-21
Payer: COMMERCIAL

## 2024-08-21 NOTE — TELEPHONE ENCOUNTER
Procedure: Left open carpal tunnel release  Facility: [facility]  Length: 20 minutes  Anesthesia: Local  Post-op appointments needed: 2 weeks with PA, 6 weeks with surgeon  Surgery packet/instructions given to patient?  to be mailed or sent via [facility]

## 2024-08-27 PROBLEM — G56.12 LEFT MEDIAN NERVE NEUROPATHY: Status: ACTIVE | Noted: 2024-08-20

## 2024-08-27 NOTE — TELEPHONE ENCOUNTER
Called to schedule surgery with: Dr. Gaitan    Spoke with: Ai     Date of Surgery: 10/4    Estimated Arrival time Discussed with Patient:   mid-morning    Location of surgery: [facility]    Pre-operative H&P: N/A - local only    Post-operative Appointment w/JERSON or Surgeon: Dr. Gaitan 10/15 at 1 PM    Discussed with patient pre-op RN will call 2-3 days prior to surgery with arrival time and instructions:  Yes     Standard Surgery Packet: Yes to be sent via [place] mail    Additional Comments: N/A      Violette Moralez on 8/27/2024 at 6:11 PM

## 2024-10-04 ENCOUNTER — HOSPITAL ENCOUNTER (OUTPATIENT)
Facility: AMBULATORY SURGERY CENTER | Age: 64
Discharge: HOME | End: 2024-10-04
Attending: PLASTIC SURGERY | Admitting: PLASTIC SURGERY
Payer: COMMERCIAL

## 2024-10-04 VITALS
OXYGEN SATURATION: 99 % | TEMPERATURE: 99 F | HEART RATE: 75 BPM | SYSTOLIC BLOOD PRESSURE: 118 MMHG | DIASTOLIC BLOOD PRESSURE: 54 MMHG | RESPIRATION RATE: 18 BRPM

## 2024-10-04 DIAGNOSIS — G56.12 LEFT MEDIAN NERVE NEUROPATHY: Primary | ICD-10-CM

## 2024-10-04 DIAGNOSIS — G56.01 RIGHT CARPAL TUNNEL SYNDROME: Primary | ICD-10-CM

## 2024-10-04 PROCEDURE — G8918 PT W/O PREOP ORDER IV AB PRO: HCPCS

## 2024-10-04 PROCEDURE — 64721 CARPAL TUNNEL SURGERY: CPT | Mod: LT | Performed by: PLASTIC SURGERY

## 2024-10-04 PROCEDURE — 64721 CARPAL TUNNEL SURGERY: CPT | Mod: LT

## 2024-10-04 PROCEDURE — G8907 PT DOC NO EVENTS ON DISCHARG: HCPCS

## 2024-10-04 RX ORDER — OXYCODONE HYDROCHLORIDE 5 MG/1
5-10 TABLET ORAL EVERY 6 HOURS PRN
Qty: 5 TABLET | Refills: 0 | Status: SHIPPED | OUTPATIENT
Start: 2024-10-04

## 2024-10-04 RX ORDER — ONDANSETRON 4 MG/1
4 TABLET, ORALLY DISINTEGRATING ORAL EVERY 8 HOURS PRN
Qty: 4 TABLET | Refills: 0 | Status: SHIPPED | OUTPATIENT
Start: 2024-10-04

## 2024-10-04 RX ORDER — AMOXICILLIN 250 MG
1-2 CAPSULE ORAL 2 TIMES DAILY
Qty: 30 TABLET | Refills: 0 | Status: SHIPPED | OUTPATIENT
Start: 2024-10-04

## 2024-10-04 NOTE — BRIEF OP NOTE
[facility]    Brief Operative Note    Pre-operative diagnosis: Left median nerve neuropathy [G56.12]  Post-operative diagnosis Same as pre-operative diagnosis    Procedure: RELEASE, CARPAL TUNNEL, left, Left - Wrist    Surgeon: Surgeons and Role:     * ROXANA Gaitan MD - Primary  Anesthesia: Local   Estimated Blood Loss: Minimal    Drains: None  Specimens: * No specimens in log *  Findings:   Left CTR.  Complications: None.  Implants: * No implants in log *        Lino Franks MD PGY-3  Plastic and Reconstructive Surgery Resident  Text page on call resident via [facility] Paging/Directory

## 2024-10-04 NOTE — OP NOTE
PREOPERATIVE DIAGNOSIS:  Left carpal tunnel syndrome.       POSTOPERATIVE DIAGNOSIS:  Left carpal tunnel syndrome.       PROCEDURES:  Left open carpal tunnel release.       SURGEON:  Rody Gaitan MD.     RESIDENT: Lino Franks MD.      ANESTHESIA:  Local.       COMPLICATIONS:  Nil.       DRAINS:  Nil.       SPECIMENS:  Nil.       BLOOD LOSS:  1 mL.       DESCRIPTION OF PROCEDURE:  After informed consent was obtained from the patient, the proper site was discussed with and appropriately marked in the procedure room.  The patient was placed in supine position with the knees comfortably flexed and pillows underneath them.   The hand was then surgically prepped and draped in a standard surgical fashion. I injected 1% lidocaine mixed 1:100,000 epinephrine 20 mL in the wrist area in the pre-op area. An Esmarch was used to exsanguinate the hand and forearm and the tourniquet was elevated to 250 mmHg pressure.  I then made an incision over the proximal palm in line with the radial aspect of the ring finger.  I then bluntly dissected down to the palmar fascia, opened the palmar fascia and then identified the transverse carpal ligament.  I looked for any nerve-like structure passing superficial to the transcarpal ligament, which I did not see.  I then made an incision with a 15 blade through the transcarpal ligament, again in line with the ulnar aspect of the ring finger, in an effort to leave part of the transcarpal ligament over the underlying median nerve.  I then went ahead and distally cut the transcarpal ligament with a sharp pair of scissors until the deep fat of the hand was identified and the entire transcarpal ligament distally was released.  Then proximally under direct vision, I released the transcarpal ligament and the flexor retinaculum, again releasing the entire median nerve in the process.  Within the carpal tunnel,  I did not see any masses.  I then let the tourniquet down.  Minimal bleeding was seen  superficially.  No deep bleeding was seen.  It was controlled with bipolar cautery.  I then closed the incision with 3-0 nylon suture in an interrupted horizontal mattress fashion.  A sterile dressing and an ACE wrap were placed.  The patient tolerated the procedure well.  All counts were correct at the end of the case.  The patient was sent to recovery room in stable condition.

## 2024-10-05 DIAGNOSIS — G89.18 POST-OPERATIVE PAIN: Primary | ICD-10-CM

## 2024-10-05 RX ORDER — GABAPENTIN 300 MG/1
300 CAPSULE ORAL 3 TIMES DAILY
Qty: 15 CAPSULE | Refills: 0 | Status: SHIPPED | OUTPATIENT
Start: 2024-10-05 | End: 2024-10-10

## 2024-10-05 RX ORDER — GABAPENTIN 100 MG/1
300 CAPSULE ORAL 3 TIMES DAILY
Status: DISCONTINUED | OUTPATIENT
Start: 2024-10-05 | End: 2024-10-05

## 2024-10-05 RX ORDER — METHOCARBAMOL 500 MG/1
1000 TABLET, FILM COATED ORAL 3 TIMES DAILY
Status: DISCONTINUED | OUTPATIENT
Start: 2024-10-05 | End: 2024-10-05

## 2024-10-05 RX ORDER — METHOCARBAMOL 750 MG/1
750 TABLET, FILM COATED ORAL 3 TIMES DAILY
Qty: 15 TABLET | Refills: 0 | Status: SHIPPED | OUTPATIENT
Start: 2024-10-05 | End: 2024-10-10

## 2024-10-05 RX ORDER — METHOCARBAMOL 750 MG/1
TABLET, FILM COATED ORAL
COMMUNITY
End: 2024-10-05

## 2024-10-05 NOTE — PROGRESS NOTES
Telephone Encounter - Progress Note    64 F, s/p carpal tunnel release pod1.  Patient called the resident on-call with questions regarding pain control.  Patient reported that they took 4 of the 5 oxycodone pills they were prescribed.  The patient was wondering if they could get other modalities for pain control.  Their pain was rated as a 7/8 out of 10.  The patient denies any new chest pain any shortness of breath any abdominal pain any fever any dizziness.  This was discussed between the adelina and senior resident on-call.  Prescription of Robaxin and gabapentin was sent to the patient's pharmacy for pain control.  The patient was informed that if the pain significantly increases or if she develops any new chest pain any new shortness of breath any new severe abdominal pain any fever or any dizziness she should page the resident on-call and be evaluated at the emergency department.    Sabino Conde MD  Plastic Surgery, PGY 1   Will do the referral today to [facility]  The only thing we are receiving is that they deemed the MRI not medically necessary

## 2024-10-08 ENCOUNTER — TELEPHONE (OUTPATIENT)
Dept: PLASTIC SURGERY | Facility: CLINIC | Age: 64
End: 2024-10-08
Payer: COMMERCIAL

## 2024-10-08 PROBLEM — G56.01 RIGHT CARPAL TUNNEL SYNDROME: Status: ACTIVE | Noted: 2024-10-04

## 2024-10-08 NOTE — TELEPHONE ENCOUNTER
Called to schedule surgery with: Dr. Gaitan    Spoke with: Ai     Date of Surgery: 1/15    Estimated Arrival time Discussed with Patient:  No    Location of surgery: [facility]    Pre-operative H&P:  Not needed, Local only    Pre-surgical Consult: N/A    Additional Appointments: N/A    Post-operative Appointment w/JERSON or Surgeon: Brenda Sen PA-C 2/4 at 11:40 AM    Discussed with patient pre-op RN will call 2-3 days prior to surgery with arrival time and instructions:  Yes     Standard Surgery Packet: Yes to be not needed per patient    Additional Comments: Patient states per instructions from last procedure, to take wrap off on Monday 10/7. States she does not know if she should keep it covered, so she has been keeping a band-aid over it to keep area covered. Patient would like to know if she should keep band-aid on to keep area covered or not.    Please give patient a call, thanks.    All patients questions were answered and was instructed to contact the clinic with any questions or concerns.       Kisha Butts on 10/8/2024 at 9:31 AM

## 2024-10-15 ENCOUNTER — OFFICE VISIT (OUTPATIENT)
Dept: ORTHOPEDICS | Facility: CLINIC | Age: 64
End: 2024-10-15
Payer: COMMERCIAL

## 2024-10-15 DIAGNOSIS — G56.12 LEFT MEDIAN NERVE NEUROPATHY: Primary | ICD-10-CM

## 2024-10-15 PROCEDURE — 99024 POSTOP FOLLOW-UP VISIT: CPT | Performed by: PLASTIC SURGERY

## 2024-10-16 NOTE — PROGRESS NOTES
PRESENTING COMPLAINT:  Post-operative visit s/p left carpal tunnel release done on 10/4/2024     HISTORY OF PRESENTING COMPLAINT: The patient is here for post-operative visit.  The patient is being seen in the presence of my nurse.     Overall doing well.  No issues currently.    On exam: Vital signs stable afebrile.  Healing and well.     ASSESSMENT AND PLAN:  Based upon the above findings, the patient is here for post-operative visit.     Sutures removed.  Advised aggressive moisturization and refrain from any heavy activities for 2-4 more weeks.  See us back as needed.    All questions were answered.  The patient was happy with the visit.   How Did Your Itching Occur?: gradual in onset  (over a period of years) How Severe Is Your Itching?: moderate Additional History: The patient has back and neck trouble and issues with the discs in his neck he has lost the strength in his dominant hand and says the itching strip has progressed further up his arm

## 2025-03-22 ENCOUNTER — HEALTH MAINTENANCE LETTER (OUTPATIENT)
Age: 65
End: 2025-03-22

## (undated) DEVICE — DRILL POINT STRK 4.2X340MM 1806-4260S

## (undated) DEVICE — IMM LIMB ELEVATOR DC40-0203

## (undated) DEVICE — SU VICRYL 3-0 PS-1 18" UND J683

## (undated) DEVICE — SOL WATER IRRIG 1000ML BOTTLE 2F7114

## (undated) DEVICE — GLOVE PROTEXIS W/NEU-THERA 8.0  2D73TE80

## (undated) DEVICE — CAST PLASTER SPLINT 5X30" 7395

## (undated) DEVICE — STPL SKIN 35W ROTATING HEAD PRW35

## (undated) DEVICE — LINEN TOWEL PACK X5 5464

## (undated) DEVICE — Device

## (undated) DEVICE — PACK EXTREMITY SOP15EXFSD

## (undated) DEVICE — PACK TOTAL HIP RIDGES LATEX PO15HIFSG

## (undated) DEVICE — GOWN XXLG REINFORCED 9071EL

## (undated) DEVICE — GLOVE PROTEXIS W/NEU-THERA 8.5  2D73TE85

## (undated) DEVICE — DRSG ABDOMINAL 07 1/2X8" 7197D

## (undated) DEVICE — SU STRATAFIX PDS PLUS 0 CT-1 18" SXPP1A401

## (undated) DEVICE — SET HANDPIECE INTERPULSE W/COAXIAL FAN SPRAY TIP 0210118000

## (undated) DEVICE — DRAPE SHEET REV FOLD 3/4 9349

## (undated) DEVICE — BLADE SAW SAGITTAL STRK SHORT 35.5X9X0.64MM 2108-148-000

## (undated) DEVICE — GLOVE PROTEXIS BLUE W/NEU-THERA 8.0  2D73EB80

## (undated) DEVICE — IMM PILLOW ABDUCT HIP MED M60-025-M

## (undated) DEVICE — BLADE KNIFE SURG 15 371115

## (undated) DEVICE — LINEN FULL SHEET 5511

## (undated) DEVICE — NDL 19GA 1.5"

## (undated) DEVICE — GLOVE PROTEXIS POWDER FREE 8.0 ORTHOPEDIC 2D73ET80

## (undated) DEVICE — BLADE SAW OSCILLATING STRYK MED 9.0X25X0.38MM 2296-003-111

## (undated) DEVICE — SPONGE SURGIFOAM 100 1974

## (undated) DEVICE — LINEN HALF SHEET 5512

## (undated) DEVICE — DRAIN HEMOVAC RESERVOIR KIT 10FR 1/8" MED 00-2550-002-10

## (undated) DEVICE — DRAPE STERI TOWEL SM 1000

## (undated) DEVICE — SU VICRYL 2-0 CT-1 27" J339H

## (undated) DEVICE — CAST PADDING 4" UNSTERILE 9044

## (undated) DEVICE — DRSG ADAPTIC 3X8" 6113

## (undated) DEVICE — ESU CORD BIPOLAR GREEN 10-4000

## (undated) DEVICE — BLADE SAW SAGITTAL STRK 18X90X1.27MM HD SYS 6 6118-127-090

## (undated) DEVICE — SU VICRYL 2-0 CT-2 27" UND J269H

## (undated) DEVICE — DRAPE COVER C-ARM SEAMLESS SNAP-KAP 03-KP26 LATEX FREE

## (undated) DEVICE — DEVICE RETRIEVER HEWSON 71111579

## (undated) DEVICE — PREP CHLORAPREP CLEAR 3ML 260400

## (undated) DEVICE — GLOVE PROTEXIS MICRO 8.5  2D73PM85

## (undated) DEVICE — GLOVE BIOGEL PI MICRO SZ 7.0 48570

## (undated) DEVICE — BNDG ELASTIC 4"X5YDS UNSTERILE 6611-40

## (undated) DEVICE — SU MONOCRYL 3-0 PS-2 27" Y427H

## (undated) DEVICE — DRSG XEROFORM 1X8"

## (undated) DEVICE — SU ETHIBOND 2 V-37 4X30" MX69G

## (undated) DEVICE — BAG CLEAR TRASH 1.3M 39X33" P4040C

## (undated) DEVICE — LINEN DRAPE 54X72" 5467

## (undated) DEVICE — DRAPE IOBAN INCISE 23X17" 6650EZ

## (undated) DEVICE — DRSG AQUACEL AG 3.5X12" HYDROFIBER 420670

## (undated) DEVICE — GLOVE BIOGEL PI MICRO INDICATOR UNDERGLOVE SZ 7.5 48975

## (undated) DEVICE — PACK HAND WRIST SOP15HWFSP

## (undated) DEVICE — BNDG ELASTIC 4" DBL LENGTH UNSTERILE 6611-14

## (undated) DEVICE — SPONGE BONE WICK FEMORAL W/SUCTION ATTACHMENT 0206-714-000

## (undated) DEVICE — PREP CHLORAPREP 26ML TINTED ORANGE  260815

## (undated) DEVICE — ESU ELEC BLADE 6" COATED E1450-6

## (undated) DEVICE — BLADE SAW SAGITTAL STRK 25X90X1.27MM HD SYS 6 6125-127-090

## (undated) DEVICE — GLOVE PROTEXIS W/NEU-THERA 7.5  2D73TE75

## (undated) DEVICE — SUCTION CANISTER MEDIVAC LINER 3000ML W/LID 65651-530

## (undated) DEVICE — LINEN ORTHO ACL PACK 5447

## (undated) DEVICE — CAST PADDING 4" STERILE 9044S

## (undated) DEVICE — PREP DURAPREP 26ML APL 8630

## (undated) DEVICE — SU VICRYL 2-0 CT-1 36" J345H

## (undated) DEVICE — CAST FIBERGLASS 4" ROLL WHITE 82004

## (undated) DEVICE — K-WIRE

## (undated) DEVICE — SU FIBERWIRE 2 38"  AR-7200

## (undated) DEVICE — SU VICRYL 0 CT-1 36" J346H

## (undated) RX ORDER — ONDANSETRON 2 MG/ML
INJECTION INTRAMUSCULAR; INTRAVENOUS
Status: DISPENSED
Start: 2022-09-19

## (undated) RX ORDER — NEOSTIGMINE METHYLSULFATE 1 MG/ML
VIAL (ML) INJECTION
Status: DISPENSED
Start: 2019-01-17

## (undated) RX ORDER — CITRIC ACID/SODIUM CITRATE 334-500MG
SOLUTION, ORAL ORAL
Status: DISPENSED
Start: 2019-01-17

## (undated) RX ORDER — LIDOCAINE HYDROCHLORIDE 20 MG/ML
INJECTION, SOLUTION EPIDURAL; INFILTRATION; INTRACAUDAL; PERINEURAL
Status: DISPENSED
Start: 2022-09-19

## (undated) RX ORDER — HYDROMORPHONE HCL IN WATER/PF 6 MG/30 ML
PATIENT CONTROLLED ANALGESIA SYRINGE INTRAVENOUS
Status: DISPENSED
Start: 2022-09-19

## (undated) RX ORDER — ONDANSETRON 2 MG/ML
INJECTION INTRAMUSCULAR; INTRAVENOUS
Status: DISPENSED
Start: 2019-01-17

## (undated) RX ORDER — DEXAMETHASONE SODIUM PHOSPHATE 4 MG/ML
INJECTION, SOLUTION INTRA-ARTICULAR; INTRALESIONAL; INTRAMUSCULAR; INTRAVENOUS; SOFT TISSUE
Status: DISPENSED
Start: 2019-01-17

## (undated) RX ORDER — ACETAMINOPHEN 325 MG/1
TABLET ORAL
Status: DISPENSED
Start: 2022-09-19

## (undated) RX ORDER — DEXAMETHASONE SODIUM PHOSPHATE 4 MG/ML
INJECTION, SOLUTION INTRA-ARTICULAR; INTRALESIONAL; INTRAMUSCULAR; INTRAVENOUS; SOFT TISSUE
Status: DISPENSED
Start: 2022-09-19

## (undated) RX ORDER — FENTANYL CITRATE 50 UG/ML
INJECTION, SOLUTION INTRAMUSCULAR; INTRAVENOUS
Status: DISPENSED
Start: 2023-09-11

## (undated) RX ORDER — GLYCOPYRROLATE 0.2 MG/ML
INJECTION INTRAMUSCULAR; INTRAVENOUS
Status: DISPENSED
Start: 2019-01-17

## (undated) RX ORDER — LIDOCAINE HYDROCHLORIDE 10 MG/ML
INJECTION, SOLUTION EPIDURAL; INFILTRATION; INTRACAUDAL; PERINEURAL
Status: DISPENSED
Start: 2019-01-17

## (undated) RX ORDER — HYDROMORPHONE HYDROCHLORIDE 1 MG/ML
INJECTION, SOLUTION INTRAMUSCULAR; INTRAVENOUS; SUBCUTANEOUS
Status: DISPENSED
Start: 2023-09-11

## (undated) RX ORDER — PROPOFOL 10 MG/ML
INJECTION, EMULSION INTRAVENOUS
Status: DISPENSED
Start: 2022-09-19

## (undated) RX ORDER — PROPOFOL 10 MG/ML
INJECTION, EMULSION INTRAVENOUS
Status: DISPENSED
Start: 2019-01-17

## (undated) RX ORDER — FENTANYL CITRATE 50 UG/ML
INJECTION, SOLUTION INTRAMUSCULAR; INTRAVENOUS
Status: DISPENSED
Start: 2019-01-17

## (undated) RX ORDER — METHYLPREDNISOLONE SODIUM SUCCINATE 125 MG/2ML
INJECTION, POWDER, LYOPHILIZED, FOR SOLUTION INTRAMUSCULAR; INTRAVENOUS
Status: DISPENSED
Start: 2023-09-11

## (undated) RX ORDER — FENTANYL CITRATE 50 UG/ML
INJECTION, SOLUTION INTRAMUSCULAR; INTRAVENOUS
Status: DISPENSED
Start: 2022-09-19

## (undated) RX ORDER — DIPHENHYDRAMINE HYDROCHLORIDE 50 MG/ML
INJECTION INTRAMUSCULAR; INTRAVENOUS
Status: DISPENSED
Start: 2023-09-11